# Patient Record
Sex: FEMALE | Race: WHITE | NOT HISPANIC OR LATINO | Employment: OTHER | URBAN - METROPOLITAN AREA
[De-identification: names, ages, dates, MRNs, and addresses within clinical notes are randomized per-mention and may not be internally consistent; named-entity substitution may affect disease eponyms.]

---

## 2023-05-24 LAB
ALANINE AMINOTRANSFERASE (SGPT) (U/L) IN SER/PLAS: 31 U/L (ref 7–45)
ALBUMIN (G/DL) IN SER/PLAS: 4.3 G/DL (ref 3.4–5)
ALKALINE PHOSPHATASE (U/L) IN SER/PLAS: 64 U/L (ref 33–136)
ANION GAP IN SER/PLAS: 10 MMOL/L (ref 10–20)
ASPARTATE AMINOTRANSFERASE (SGOT) (U/L) IN SER/PLAS: 36 U/L (ref 9–39)
BILIRUBIN TOTAL (MG/DL) IN SER/PLAS: 0.7 MG/DL (ref 0–1.2)
CALCIUM (MG/DL) IN SER/PLAS: 9.5 MG/DL (ref 8.6–10.3)
CARBON DIOXIDE, TOTAL (MMOL/L) IN SER/PLAS: 28 MMOL/L (ref 21–32)
CHLORIDE (MMOL/L) IN SER/PLAS: 107 MMOL/L (ref 98–107)
CREATININE (MG/DL) IN SER/PLAS: 1.05 MG/DL (ref 0.5–1.05)
ERYTHROCYTE DISTRIBUTION WIDTH (RATIO) BY AUTOMATED COUNT: 13.4 % (ref 11.5–14.5)
ERYTHROCYTE MEAN CORPUSCULAR HEMOGLOBIN CONCENTRATION (G/DL) BY AUTOMATED: 30.8 G/DL (ref 32–36)
ERYTHROCYTE MEAN CORPUSCULAR VOLUME (FL) BY AUTOMATED COUNT: 96 FL (ref 80–100)
ERYTHROCYTES (10*6/UL) IN BLOOD BY AUTOMATED COUNT: 4.55 X10E12/L (ref 4–5.2)
GFR FEMALE: 52 ML/MIN/1.73M2
GLUCOSE (MG/DL) IN SER/PLAS: 104 MG/DL (ref 74–99)
HEMATOCRIT (%) IN BLOOD BY AUTOMATED COUNT: 43.5 % (ref 36–46)
HEMOGLOBIN (G/DL) IN BLOOD: 13.4 G/DL (ref 12–16)
LEUKOCYTES (10*3/UL) IN BLOOD BY AUTOMATED COUNT: 7.1 X10E9/L (ref 4.4–11.3)
NRBC (PER 100 WBCS) BY AUTOMATED COUNT: 0 /100 WBC (ref 0–0)
PLATELETS (10*3/UL) IN BLOOD AUTOMATED COUNT: 191 X10E9/L (ref 150–450)
POTASSIUM (MMOL/L) IN SER/PLAS: 4.4 MMOL/L (ref 3.5–5.3)
PROTEIN TOTAL: 6.4 G/DL (ref 6.4–8.2)
SODIUM (MMOL/L) IN SER/PLAS: 141 MMOL/L (ref 136–145)
THYROTROPIN (MIU/L) IN SER/PLAS BY DETECTION LIMIT <= 0.05 MIU/L: 2.16 MIU/L (ref 0.44–3.98)
UREA NITROGEN (MG/DL) IN SER/PLAS: 20 MG/DL (ref 6–23)

## 2023-10-21 PROBLEM — I49.1 PREMATURE ATRIAL BEATS: Status: ACTIVE | Noted: 2023-10-21

## 2023-10-21 PROBLEM — I47.10 PAROXYSMAL SUPRAVENTRICULAR TACHYCARDIA (CMS-HCC): Status: ACTIVE | Noted: 2023-10-21

## 2023-10-21 PROBLEM — I35.1 AORTIC VALVE REGURGITATION, ACQUIRED: Status: ACTIVE | Noted: 2023-10-21

## 2023-10-21 PROBLEM — R30.0 DYSURIA: Status: ACTIVE | Noted: 2023-10-21

## 2023-10-21 PROBLEM — E78.5 HYPERLIPIDEMIA: Status: ACTIVE | Noted: 2023-10-21

## 2023-10-21 PROBLEM — R93.1 ABNORMAL ECHOCARDIOGRAM: Status: ACTIVE | Noted: 2023-10-21

## 2023-10-21 PROBLEM — N95.2 VAGINAL ATROPHY: Status: ACTIVE | Noted: 2023-10-21

## 2023-10-21 PROBLEM — G47.62 NOCTURNAL LEG CRAMPS: Status: ACTIVE | Noted: 2023-10-21

## 2023-10-21 PROBLEM — N81.10 PELVIC ORGAN PROLAPSE QUANTIFICATION STAGE 2 CYSTOCELE: Status: ACTIVE | Noted: 2023-10-21

## 2023-10-21 PROBLEM — I10 ESSENTIAL HYPERTENSION, BENIGN: Status: ACTIVE | Noted: 2023-10-21

## 2023-10-21 PROBLEM — I25.10 CORONARY ARTERY DISEASE: Status: ACTIVE | Noted: 2023-10-21

## 2023-10-21 PROBLEM — R35.0 URINARY FREQUENCY: Status: ACTIVE | Noted: 2023-10-21

## 2023-10-21 PROBLEM — N81.9 VAGINAL VAULT PROLAPSE: Status: ACTIVE | Noted: 2023-10-21

## 2023-10-21 PROBLEM — M54.2 CERVICALGIA: Status: ACTIVE | Noted: 2023-10-21

## 2023-10-21 PROBLEM — Z95.1 H/O FOUR VESSEL CORONARY ARTERY BYPASS GRAFT: Status: ACTIVE | Noted: 2023-10-21

## 2023-10-21 PROBLEM — I49.3 PREMATURE VENTRICULAR CONTRACTIONS: Status: ACTIVE | Noted: 2023-10-21

## 2023-10-21 RX ORDER — ALBUTEROL SULFATE 90 UG/1
2 AEROSOL, METERED RESPIRATORY (INHALATION)
COMMUNITY
Start: 2019-01-06 | End: 2024-05-07 | Stop reason: WASHOUT

## 2023-10-21 RX ORDER — ROSUVASTATIN CALCIUM 20 MG/1
1 TABLET, COATED ORAL DAILY
COMMUNITY
Start: 2022-08-29 | End: 2023-11-06 | Stop reason: SDUPTHER

## 2023-10-21 RX ORDER — LOSARTAN POTASSIUM 50 MG/1
1 TABLET ORAL DAILY
COMMUNITY
Start: 2019-06-06 | End: 2024-05-14 | Stop reason: HOSPADM

## 2023-10-21 RX ORDER — ASPIRIN 81 MG/1
1 TABLET ORAL DAILY
COMMUNITY

## 2023-10-24 ENCOUNTER — PROCEDURE VISIT (OUTPATIENT)
Dept: OBSTETRICS AND GYNECOLOGY | Facility: CLINIC | Age: 83
End: 2023-10-24
Payer: MEDICARE

## 2023-10-24 VITALS
HEIGHT: 65 IN | RESPIRATION RATE: 16 BRPM | DIASTOLIC BLOOD PRESSURE: 82 MMHG | TEMPERATURE: 98.1 F | HEART RATE: 78 BPM | WEIGHT: 95.8 LBS | SYSTOLIC BLOOD PRESSURE: 138 MMHG | BODY MASS INDEX: 15.96 KG/M2

## 2023-10-24 SDOH — ECONOMIC STABILITY: FOOD INSECURITY: WITHIN THE PAST 12 MONTHS, THE FOOD YOU BOUGHT JUST DIDN'T LAST AND YOU DIDN'T HAVE MONEY TO GET MORE.: NEVER TRUE

## 2023-10-24 SDOH — ECONOMIC STABILITY: FOOD INSECURITY: WITHIN THE PAST 12 MONTHS, YOU WORRIED THAT YOUR FOOD WOULD RUN OUT BEFORE YOU GOT MONEY TO BUY MORE.: NEVER TRUE

## 2023-10-24 ASSESSMENT — ENCOUNTER SYMPTOMS
LOSS OF SENSATION IN FEET: 0
DEPRESSION: 0
OCCASIONAL FEELINGS OF UNSTEADINESS: 0

## 2023-10-24 ASSESSMENT — COLUMBIA-SUICIDE SEVERITY RATING SCALE - C-SSRS
1. IN THE PAST MONTH, HAVE YOU WISHED YOU WERE DEAD OR WISHED YOU COULD GO TO SLEEP AND NOT WAKE UP?: NO
6. HAVE YOU EVER DONE ANYTHING, STARTED TO DO ANYTHING, OR PREPARED TO DO ANYTHING TO END YOUR LIFE?: NO
2. HAVE YOU ACTUALLY HAD ANY THOUGHTS OF KILLING YOURSELF?: NO

## 2023-10-24 ASSESSMENT — PATIENT HEALTH QUESTIONNAIRE - PHQ9
2. FEELING DOWN, DEPRESSED OR HOPELESS: NOT AT ALL
SUM OF ALL RESPONSES TO PHQ9 QUESTIONS 1 AND 2: 0
1. LITTLE INTEREST OR PLEASURE IN DOING THINGS: NOT AT ALL

## 2023-10-24 ASSESSMENT — PAIN SCALES - GENERAL: PAINLEVEL: 0-NO PAIN

## 2023-10-24 NOTE — PROGRESS NOTES
"Pessary Check    This is a 83 y.o. with a #3 ring with support pessary here for a routine pessary check.    Date of last check: 9/29/2023    Today she reports:  Pessary comfortable: {yes,no:21211}  Vaginal bleeding:{yes,no:21211}  Abnormal vaginal discharge:{yes,no:21211}  Using vaginal estrogen:{yes,no:21211}  ***    Exam:  OBGyn Exam     Procedure:   Pessary position on presentation: {Normal/Abnormal:21235}  Pessary removed and cleaned {WITH/WITHOUT:07411} difficulty  Speculum exam: Vaginal mucosa was examined for the presence of irritation, trauma and/or bleeding   Erosions: {yes,no:21211}   Vaginal atrophy: {yes,no:21211}   Silver nitrate applied :{yes,no:21211}  Pessary replaced {WITH/WITHOUT:38925} difficulty.    Assessment/Plan:  {Assess/PlanSmartLinks:11029}    The patient is satisfied with the pessary and plans to continue use.   Risks, alternative and routine maintenance reviewed with patient.  {continue estrogen vs no estrogen pessary:63356::\"Continue low dose vaginal estrogen to help prevent erosions with pessary. \"}    She will return to the office in 2-3 months for recheck or sooner should she have any problems.    All questions and concerns were answered and addressed.   "

## 2023-11-04 PROBLEM — I47.10 PAROXYSMAL SUPRAVENTRICULAR TACHYCARDIA (CMS-HCC): Status: RESOLVED | Noted: 2023-10-21 | Resolved: 2023-11-04

## 2023-11-04 PROBLEM — I49.3 PREMATURE VENTRICULAR CONTRACTIONS: Status: RESOLVED | Noted: 2023-10-21 | Resolved: 2023-11-04

## 2023-11-06 ENCOUNTER — OFFICE VISIT (OUTPATIENT)
Dept: CARDIOLOGY | Facility: CLINIC | Age: 83
End: 2023-11-06
Payer: MEDICARE

## 2023-11-06 ENCOUNTER — OFFICE VISIT (OUTPATIENT)
Dept: NEUROLOGY | Facility: CLINIC | Age: 83
End: 2023-11-06
Payer: MEDICARE

## 2023-11-06 ENCOUNTER — LAB (OUTPATIENT)
Dept: LAB | Facility: LAB | Age: 83
End: 2023-11-06
Payer: MEDICARE

## 2023-11-06 VITALS
DIASTOLIC BLOOD PRESSURE: 62 MMHG | OXYGEN SATURATION: 96 % | WEIGHT: 97.3 LBS | HEIGHT: 63 IN | HEART RATE: 78 BPM | SYSTOLIC BLOOD PRESSURE: 126 MMHG | BODY MASS INDEX: 17.24 KG/M2

## 2023-11-06 VITALS
DIASTOLIC BLOOD PRESSURE: 72 MMHG | BODY MASS INDEX: 17.05 KG/M2 | SYSTOLIC BLOOD PRESSURE: 124 MMHG | RESPIRATION RATE: 16 BRPM | WEIGHT: 96.2 LBS | HEART RATE: 84 BPM | TEMPERATURE: 96.8 F | HEIGHT: 63 IN

## 2023-11-06 DIAGNOSIS — R41.3 MEMORY LOSS: Primary | ICD-10-CM

## 2023-11-06 DIAGNOSIS — E78.2 MIXED HYPERLIPIDEMIA: Primary | ICD-10-CM

## 2023-11-06 DIAGNOSIS — R41.3 MEMORY LOSS: ICD-10-CM

## 2023-11-06 LAB — TSH SERPL-ACNC: 1.93 MIU/L (ref 0.44–3.98)

## 2023-11-06 PROCEDURE — 83921 ORGANIC ACID SINGLE QUANT: CPT

## 2023-11-06 PROCEDURE — 3074F SYST BP LT 130 MM HG: CPT | Performed by: INTERNAL MEDICINE

## 2023-11-06 PROCEDURE — 1160F RVW MEDS BY RX/DR IN RCRD: CPT | Performed by: PSYCHIATRY & NEUROLOGY

## 2023-11-06 PROCEDURE — 3078F DIAST BP <80 MM HG: CPT | Performed by: INTERNAL MEDICINE

## 2023-11-06 PROCEDURE — 84443 ASSAY THYROID STIM HORMONE: CPT

## 2023-11-06 PROCEDURE — 1036F TOBACCO NON-USER: CPT | Performed by: PSYCHIATRY & NEUROLOGY

## 2023-11-06 PROCEDURE — 1159F MED LIST DOCD IN RCRD: CPT | Performed by: PSYCHIATRY & NEUROLOGY

## 2023-11-06 PROCEDURE — 99204 OFFICE O/P NEW MOD 45 MIN: CPT | Performed by: PSYCHIATRY & NEUROLOGY

## 2023-11-06 PROCEDURE — 82607 VITAMIN B-12: CPT

## 2023-11-06 PROCEDURE — 3078F DIAST BP <80 MM HG: CPT | Performed by: PSYCHIATRY & NEUROLOGY

## 2023-11-06 PROCEDURE — 3074F SYST BP LT 130 MM HG: CPT | Performed by: PSYCHIATRY & NEUROLOGY

## 2023-11-06 PROCEDURE — 1126F AMNT PAIN NOTED NONE PRSNT: CPT | Performed by: INTERNAL MEDICINE

## 2023-11-06 PROCEDURE — 99214 OFFICE O/P EST MOD 30 MIN: CPT | Performed by: INTERNAL MEDICINE

## 2023-11-06 PROCEDURE — 82746 ASSAY OF FOLIC ACID SERUM: CPT

## 2023-11-06 PROCEDURE — 1159F MED LIST DOCD IN RCRD: CPT | Performed by: INTERNAL MEDICINE

## 2023-11-06 PROCEDURE — 1160F RVW MEDS BY RX/DR IN RCRD: CPT | Performed by: INTERNAL MEDICINE

## 2023-11-06 PROCEDURE — 36415 COLL VENOUS BLD VENIPUNCTURE: CPT

## 2023-11-06 PROCEDURE — 1126F AMNT PAIN NOTED NONE PRSNT: CPT | Performed by: PSYCHIATRY & NEUROLOGY

## 2023-11-06 PROCEDURE — 1036F TOBACCO NON-USER: CPT | Performed by: INTERNAL MEDICINE

## 2023-11-06 RX ORDER — ROSUVASTATIN CALCIUM 40 MG/1
40 TABLET, COATED ORAL DAILY
Qty: 90 TABLET | Refills: 3 | Status: SHIPPED | OUTPATIENT
Start: 2023-11-06 | End: 2024-11-05

## 2023-11-06 ASSESSMENT — ENCOUNTER SYMPTOMS
DYSPNEA ON EXERTION: 1
DIFFICULTY WITH CONCENTRATION: 1
CONFUSION: 1

## 2023-11-06 NOTE — PATIENT INSTRUCTIONS
The patient needs an MRI of the brain without contrast, EEG, work-up for the reversible causes of dementia and neuropsychiatric testing.  The patient should continue all of her medicines and stroke risk factor modification.  The patient should try to stay as active as she can both mentally and physically.  I discussed all these issues in detail with the patient and her daughter and answered all her questions.  The patient follow-up with me in 6 months.

## 2023-11-06 NOTE — PROGRESS NOTES
Subjective   Kimberly Randle is a 83 y.o. female.    Chief Complaint:  Follow-up coronary artery disease, coronary bypass grafting, hypertension, valvular heart disease.  Change in mental status.    HPI    On her last visit in May 2023 she presented with symptoms of changing mental status.  We did do a CT of the brain which showed significant small vessel disease.  She was also seen and evaluated by the neurology service.  Additional laboratory data was ordered.  Since her last visit she feels about the same.  She continues to live independently on her own.  She has a close supportive family that looks in her on her on a regular basis.  No problems with her medications.  Some of her history was obtained from her daughter because of her mental status.    The patient's   in 2022 when he became suddenly unresponsive at home.  He was found to be in ventricular fibrillatory arrest and could not be resuscitated.    A stress thallium study performed in 2018 showed a fixed apical defect with an ejection fraction of 74%.     Her cardiac history is significant for coronary artery bypass grafting performed in  with a left internal mammary graft to the anterior descending, she has a right internal mammary graft to the right coronary artery. She has a radial artery graft to the diagonal branch and circumflex coronary artery.     Other medical problems including history of hyperlipidemia. She has a history of moderate aortic regurgitation.       Allergies  Medication    · atorvastatin   Adverse Reaction; Confusion; Gatrointestinal upset; Severe stomach cramping; Recorded By: Ana Chang; 2022 5:01:02 PM   · codeine   Recorded By: Maoj Miller; 2018 1:52:11 PM     Family History  Mother    · Family history of congestive heart failure (V17.49) (Z82.49)     Social History  Problems    · Does not use illicit drugs (V49.89) (Z78.9)   · Former smoker (V15.82) (Z87.891)   · Occasional alcohol  use      Review of Systems   Constitutional: Positive for malaise/fatigue.   Cardiovascular:  Positive for dyspnea on exertion.   Musculoskeletal:  Positive for arthritis.   Neurological:  Positive for difficulty with concentration.       Visit Vitals  Smoking Status Former        Objective     Constitutional:       Appearance: Not in distress.   Neck:      Vascular: JVD normal.   Pulmonary:      Breath sounds: Normal breath sounds.   Cardiovascular:      Normal rate. Regular rhythm. Normal S1. Normal S2.       Murmurs: There is a grade 1/6 systolic murmur.      No gallop.    Pulses:     Intact distal pulses.   Edema:     Peripheral edema absent.   Abdominal:      General: There is no distension.      Palpations: Abdomen is soft.   Neurological:      Mental Status: Alert.         Lab Review:   Lab Results   Component Value Date     05/24/2023    K 4.4 05/24/2023     05/24/2023    CO2 28 05/24/2023    BUN 20 05/24/2023    CREATININE 1.05 05/24/2023    GLUCOSE 104 (H) 05/24/2023    CALCIUM 9.5 05/24/2023     Lab Results   Component Value Date    CHOL 156 11/23/2022    TRIG 125 11/23/2022    HDL 61.4 11/23/2022       Assessment:    1.  Coronary disease.  Status post coronary bypass grafting.  No anginal symptoms although history is somewhat suspect.    2.  Hyperlipidemia.  Cholesterol 167, HDL 59, LDL 90.  She is not at goal.  Discussed the importance of getting her lipid profiles as low as possible not only for her cardiac status but also for her neurologic status.  We will increase rosuvastatin to 40 mg daily.    3.  Hypertension.  Blood pressures are normal.    4.  Memory loss.  Being seen and evaluated by the neurology service.  Today's laboratory data is pending.

## 2023-11-06 NOTE — PROGRESS NOTES
Subjective     Kimberly Randle is a 83 y.o. year old female    HPI  The patient and her daughter both attend the appointment today.  The patient feels that she is doing very well from a neurological standpoint and that her memory is fine.  Her daughter who is in the room corroborates this and feels that the the patient is doing well.  Her other daughter is an occupational therapist and feels that the patient has been in decline for approximately several years.  The patient's daughter feels that she has had some mild general cognitive decline but no specific area of cognition has been affected.  Her   this past February and she does not like being alone but she does not want to have anyone else in the house.  Patient is able to take care of all of her activities of daily living including cooking, dressing, bathing and cleaning the house.  The patient denies any headaches, double vision, vision loss, facial or extremity weakness or numbness or walking problems.  The patient had a CT scan of the brain done in  secondary to head and neck pain.  It showed mild to moderate white matter disease but no acute process was noted.  The daughter who was in room feels that her memory function is fine.  The patient has banda of  for healthcare and finances and she also has a living well.    Review of Systems   Psychiatric/Behavioral:  Positive for confusion.    All other systems reviewed and are negative.        Patient Active Problem List   Diagnosis    Abnormal echocardiogram    Aortic valve regurgitation, acquired    Cervicalgia    Coronary artery disease    Dysuria    Essential hypertension, benign    H/O four vessel coronary artery bypass graft    Hyperlipidemia    Nocturnal leg cramps    Pelvic organ prolapse quantification stage 2 cystocele    Premature atrial beats    Urinary frequency    Vaginal atrophy    Vaginal vault prolapse     Past Medical History:   Diagnosis Date    Personal history of  other diseases of the circulatory system     History of coronary artery disease    Personal history of other diseases of the circulatory system     History of cardiac disorder    Personal history of other diseases of the circulatory system 12/06/2018    History of coronary artery disease    Personal history of other endocrine, nutritional and metabolic disease     History of hyperlipidemia    Personal history of other specified conditions     History of palpitations    Personal history of transient ischemic attack (TIA), and cerebral infarction without residual deficits     History of transient cerebral ischemia     Past Surgical History:   Procedure Laterality Date    CORONARY ARTERY BYPASS GRAFT  04/04/2018    CABG    OTHER SURGICAL HISTORY  04/27/2018    Surgery    OTHER SURGICAL HISTORY  12/03/2020    Hysterectomy    OTHER SURGICAL HISTORY  12/03/2020    Lumpectomy     Social History     Tobacco Use    Smoking status: Former     Types: Cigarettes     Passive exposure: Past    Smokeless tobacco: Never   Substance Use Topics    Alcohol use: Not Currently     family history includes Cancer in her father; Heart failure in her mother.    Current Outpatient Medications:     aspirin 81 mg EC tablet, Take 1 tablet (81 mg) by mouth once daily., Disp: , Rfl:     calcium citrate/vitamin D2 (MONIKA-CITRATE ORAL), Take 1 tablet by mouth once daily., Disp: , Rfl:     docosahexaenoic acid/epa (FISH OIL ORAL), Use as directed, Disp: , Rfl:     glucosamine/chondr grove A sod (glucosamine-chondroitin) 1,500-1,200 mg/30 mL liquid, Glucosamine-Chondroitin 8340-1825 MG/30ML Oral Liquid  Refills: 0     Active, Disp: , Rfl:     losartan (Cozaar) 50 mg tablet, Take 1 tablet (50 mg) by mouth once daily., Disp: , Rfl:     rosuvastatin (Crestor) 20 mg tablet, Take 1 tablet (20 mg) by mouth once daily., Disp: , Rfl:     albuterol (ProAir HFA) 90 mcg/actuation inhaler, Inhale 2 puffs every 4 hours., Disp: , Rfl:   Allergies   Allergen Reactions  "   Atorvastatin GI Upset    Amoxicillin Unknown    Codeine Unknown       Objective   /72 (BP Location: Left arm)   Pulse 84   Temp 36 °C (96.8 °F) (Temporal)   Resp 16   Ht 1.6 m (5' 3\")   Wt (!) 43.6 kg (96 lb 3.2 oz)   BMI 17.04 kg/m²     GENERAL APPEARANCE:  No distress, alert and cooperative.     CARDIOVASCULAR: Regular, rate and rhythm, without murmur. No carotid bruits. Pulses +2 and equal in all extremities. No edema, or tenderness to palpation.    MENTAL STATE:  Orientation was normal to time, place and person.  The patient is able to remember 0 out of 3 objects at 5 minutes and her remote memory function was normal.  Attention span and concentration were normal. Language testing was normal for comprehension, repetition, expression, and naming. Calculation was intact. The patient could correctly interpret a picture, and copy a diagram. General fund of knowledge was intact.  The patient's Mini-Mental status examination score was 22 out of 30.    OPHTHALMOSCOPIC: The ophthalmoscopic exam normal. The fundi were well visualized with normal disc margins, clear vessels and vascular pulsations. No disc edema. The cup/disk ratio was not enlarged. No hemorrhages or exudates were present in the posterior segments that were visualized.     CRANIAL NERVES:  Cranial nerves were normal.      CN 2- Visual Acuity  OD: 20/20 (corrected)   OS: 20/20 (corrected); visual fields full to confrontation.      CN 3, 4, 6-  Pupils round, 4 mm in diameter, equally reactive to light. No ptosis. EOMs normal alignment, full range of movement, no nystagmus     CN 5- Facial sensation intact bilaterally. Normal corneal reflexes.      CN 7- Normal and symmetric facial strength. Nasolabial folds symmetric.     CN 8- Hearing intact to finger rub, whisper.      CN 9- Palate elevates symmetrically. Normal gag reflex.      CN 11- Normal strength of shoulder shrug and neck turning      CN 12- Tongue midline, with normal bulk and " strength; no fasciculations.     MOTOR:  Motor exam was normal. Muscle bulk and tone were normal in both upper and lower extremities. Muscle strength was 5/5 in distal and proximal muscles in both upper and lower extremities. No fasciculations, tremor or other abnormal movements were present.     REFLEXES: The patient's reflexes are 1+ in the biceps, triceps and brachioradialis, 2+ at the patellae bilaterally and 0 at the ankles bilaterally.  Babinski: toes downgoing to plantar stimulation. No clonus, frontal release signs or other pathologic reflexes present.     SENSORY: Sensory exam was intact to light touch, sharp/dull, vibration and position sense in both UE and LE.     COORDINATION: JAIME were intact in upper and lower extremities.  In UE- finger-nose-finger was intact and in LE- heel-to-shin was intact without dysmetria or overshoot.      GAIT: Station was stable with a normal base and negative Romberg sign. Gait was stable with a normal arm swing and speed. No ataxia, shuffling, steppage or waddling was noted. Tandem gait was intact. Postural reflexes were normal.     Assessment/Plan   Impression: The patient is an 83-year-old female who feels she is doing quite well but one of her daughter feels that she is not functioning well from a cognitive standpoint.  Her neurological examination is mildly abnormal and noted above.  The differential diagnosis includes minimal cognitive impairment, dementia, subdural hematoma, brain tumor, seizure and vitamin deficiency.      Plan: The patient needs an MRI of the brain without contrast, EEG, work-up for the reversible causes of dementia and neuropsychiatric testing.  The patient should continue all of her medicines and stroke risk factor modification.  The patient should try to stay as active as she can both mentally and physically.  I discussed all these issues in detail with the patient and her daughter and answered all her questions.  The patient follow-up with me in  6 months.

## 2023-11-07 LAB
FOLATE SERPL-MCNC: >24 NG/ML
VIT B12 SERPL-MCNC: 778 PG/ML (ref 211–911)

## 2023-11-09 ENCOUNTER — ANCILLARY PROCEDURE (OUTPATIENT)
Dept: RADIOLOGY | Facility: CLINIC | Age: 83
End: 2023-11-09
Payer: MEDICARE

## 2023-11-09 DIAGNOSIS — R41.3 MEMORY LOSS: ICD-10-CM

## 2023-11-09 LAB — METHYLMALONATE SERPL-SCNC: 0.15 UMOL/L (ref 0–0.4)

## 2023-11-09 PROCEDURE — 70551 MRI BRAIN STEM W/O DYE: CPT | Performed by: RADIOLOGY

## 2023-11-09 PROCEDURE — 70551 MRI BRAIN STEM W/O DYE: CPT

## 2023-11-15 ENCOUNTER — HOSPITAL ENCOUNTER (OUTPATIENT)
Dept: NEUROLOGY | Facility: HOSPITAL | Age: 83
Discharge: HOME | End: 2023-11-15
Payer: MEDICARE

## 2023-11-15 DIAGNOSIS — R41.3 MEMORY LOSS: ICD-10-CM

## 2023-11-15 PROCEDURE — 95819 EEG AWAKE AND ASLEEP: CPT

## 2023-11-15 PROCEDURE — 95819 EEG AWAKE AND ASLEEP: CPT | Performed by: PSYCHIATRY & NEUROLOGY

## 2024-01-04 ENCOUNTER — TELEPHONE (OUTPATIENT)
Dept: CARDIOLOGY | Facility: CLINIC | Age: 84
End: 2024-01-04
Payer: MEDICARE

## 2024-01-04 NOTE — TELEPHONE ENCOUNTER
Pt called to report she was taking Rosuvastatin 20mg and Dr. Barrett increased dose to 40mg.    Pt states she cannot take Rosuvastatin because it is causing her to have memory issues and increased anxiety. Instructed pt to try taking Rosuvastatin every other day and I will discuss with Dr. Barrett when he returns from vacation the week of 1/15/23. Pt verbalizes understanding of all instructions. All questions and concerns addressed at this time.

## 2024-01-09 ENCOUNTER — APPOINTMENT (OUTPATIENT)
Dept: OBSTETRICS AND GYNECOLOGY | Facility: CLINIC | Age: 84
End: 2024-01-09
Payer: MEDICARE

## 2024-01-15 NOTE — TELEPHONE ENCOUNTER
Donna returned phone call and wanted to provide update regarding recent testing, labs. Per Donna, pt saw PCP Dr. Brito who stopped Rosuvastatin due to symptoms of dizziness, off balance, memory loss. Donna also states Dr. Brito ordered blood work and carotid US. Per Donna, pt is also seeing neurology and had testing with Dr. Love and will be seeing neurodiagnostics regarding memory loss.    Donna wanted to provide update regarding pt's care at this time. Dr. Barrett notified of above.

## 2024-01-15 NOTE — TELEPHONE ENCOUNTER
Reviewed message with Dr. Barrett. Pt has also been intolerant to Lipitor in the past. Pt is currently seeing Neuropsychologist for memory loss.    Per Dr. Barrett, pt's current symptoms are not due to Rosuvastatin. Pt should continue taking Rosuvastatin.    LM for pt to call office.

## 2024-01-16 ENCOUNTER — OFFICE VISIT (OUTPATIENT)
Dept: PSYCHOLOGY | Facility: CLINIC | Age: 84
End: 2024-01-16
Payer: MEDICARE

## 2024-01-16 DIAGNOSIS — F41.9 ANXIETY DISORDER, UNSPECIFIED TYPE: Primary | ICD-10-CM

## 2024-01-16 DIAGNOSIS — F03.90 MAJOR NEUROCOGNITIVE DISORDER (MULTI): ICD-10-CM

## 2024-01-16 PROCEDURE — 99211NT NEUROPYSCH TESTING PENDING FINAL BILLING

## 2024-01-16 PROCEDURE — 1159F MED LIST DOCD IN RCRD: CPT

## 2024-01-16 PROCEDURE — 1126F AMNT PAIN NOTED NONE PRSNT: CPT

## 2024-01-16 PROCEDURE — 1160F RVW MEDS BY RX/DR IN RCRD: CPT

## 2024-01-16 PROCEDURE — 1036F TOBACCO NON-USER: CPT

## 2024-01-16 NOTE — PROGRESS NOTES
NEUROPSYCHOLOGICAL EXAMINATION REPORT    Name: Kimberly Randle   MRN: [id number]   Age: 83 y.o.   Handedness: Right   Ethnicity: White   Education: 12    Referring Provider: Daniel Love MD    Date of Service: 1/16/2024     Reason For Referral  Ms. Kimberly Randle is an 83 y.o. female who was referred for a neuropsychological evaluation due to complaints of memory loss. The purpose of the evaluation was reviewed. In the context of COVID-19, she was informed of the relative risk of virus exposure involved in visiting a medical facility and the measures being taken at [facility] to reduce the risk of spreading the virus. All questions were answered. The patient verbalized understanding and written informed consent was obtained.    Diagnosis  Major Neurocognitive Disorder   Anxiety, unspecified     Summary and Impressions   Ms. Randle is an 83 y.o. female with a primarily medical history significant for coronary artery disease, hypertension, and hyperlipidemia who is presenting for new onset of memory complaints. The patient demonstrated adequate task engagement considering genuine cognitive impairment. Compared to her estimated average pre-morbid functioning, the patient demonstrated consistent impairments on tasks assessing speed of processing, language, memory, and executive functioning. The patient had exceptionally low to below average performance on all timed tasks. Language was notable for low average confrontation naming (elevated number of paraphasic errors) and below average/exceptionally low verbal fluency (no evidence of phonemic/semantic split, but could be due to floor effect). Memory fell primarily in the below average to exceptionally low range across trials, where she could not recall any information following a delay for all 3 memory tests and did not benefit from cuing on 2 of 3 memory tests. Finally, the patient demonstrated impaired executive functioning skills, where she was  unable to complete a cognitive set shifting task and struggled on a novel problem solving task. Similarly, she had significant difficulty answering questions related to health and safety. There was low average or higher performance on tasks assessing attention, working memory, and visual construction. She denied clinically significant mood symptoms on screeners, although she endorsed some grief symptoms. Her family also reported she is more anxious, easily overwhelmed, and exhibiting more OCD like behavior.     Given evidence of cognitive decline compared to her premorbid level and interference with instrumental activities of daily living, the patient's profile is best characterized by a Major Neurocognitive Disorder (F03.90). The etiology of the diagnosis is suspected to be related to Alzheimer's disease pathology considering her cognitive profile (amnestic memory profile and inefficient language skills), evidence of temporal lobe atrophy on brain imaging, report of gradual and progressing symptoms, evidence of anosognosia, and behavioral observations of rapid forgetting. The patient's cognitive profile is likely also impacted by vascular pathology (with evidence of small-vessel ischemic change on imaging) and grief/anxiety.     Recommendations   Given the nature of present findings, ongoing monitoring and follow-up care with Dr. Love would be prudent. Although not considered necessary, CSF testing for AD pathology could be completed if there remains desire for greater diagnostic certainty. Regardless, Ms. Randle is encouraged to talk to her prescribing provider about medications that can assist with cognitive decline. All medications need to be approved by a licensed medical provider to ensure they are not medically contraindicated and to reduce the risk of drug interactions.  Ms. Randle lives alone, and there are significant concerns related to her managing functional tasks considering the present  neurocognitive findings. She may benefit from increased family involvement, such as assistance with medication management, medical appointments, transportation, and meal preparation. If additional supports appear necessary or there is further decline, she may benefit from additional support (e.g., home health, assistance services, or relocating to an assisted-living program/nursing home) to provide more structure, safety, and social engagement.     Safety measures should be put in place to reduce the risk of fall, such as using an assistive walking device, installing guards in the shower, keeping living spaces free from clutter, and using a medical alert button. She may also benefit in a referral to PT due to significant concerns with her balance.   Ms. Randle and her family may be interested in a consultation with a  who specializes in cognitive decline. Services offered include counseling, educational resources, support groups, and preparation for future changes. Ms. Randle should establish care at [facility] ([phone]).   Ms. Randle is encouraged to refrain from driving. This is in consideration that she demonstrated significant impairments on tasks assessing speed of processing, cognitive set shifting, and memory. The patient can schedule a driving evaluation if she would like a second opinion. Please contact the Rehabilitation Department at [facility] ([phone]). The Association for  Rehabilitation may have additional options for  rehabilitation specialists ([phone] or emailing info@aded.net).   The Alzheimer's Association (www.alz.org) has useful resources and support for those living or caring for an individual with Alzheimer's Disease. They also offer a 24-hour helpline (1-787.381.3578). Additionally, FLOYD Dominique, LISA at the Alzheimer's Association ([phone]) coordinates AD support groups.   The family may be interested  in obtaining resources and guides to assist those living with dementia. The following resources are recommended: (1) The 36-Hour Day: A Family Guide to Caring for Persons with Alzheimer's Disease, Related Dementing Illnesses, and Memory Loss in Later Life (3rd Edition) by Abigail Ibarra M.A. and Jamil Palacios M.D., (2) Moments of [place] by Silvia Lopez provides a number of simple, helpful hints for caregivers, and (3) Alzheimer's Disease: A Handbook for Caregivers (3rd Edition) by MARBELLA Box M.D., JOI Trejo M.D., JOI Cervantes, Ph.D., and NIKOLAI Santana, RNC.   Physical, mental, and social stimulation are known to be protective factors for brain health. This includes engaging in regular exercise (e.g., walking and practicing yoga), staying socially active (e.g., volunteering and spending time with friends/family), and engaging in cognitively stimulating activities (e.g., completing puzzles or learning new hobbies). She should also manage other co-morbid medical conditions that may be impacting cognition as part of brain health (e.g., vascular risk factors, hearing loss without use of hearing aids, and anxiety/grief).   Ms. Randle can undergo an updated neuropsychological evaluation if there are significant change in neurological health and/or notable/unexpected change in functional status. The current evaluation can serve as a baseline for any future re-evaluations.    To be billed after 1/23/2024 feedback: 81821 = 1; 79284 = 1; 29754 = 1; 74955=3; 08136=6; 28912 = 1; 78151 = 4    History of Presenting Illness   [facility] indicate [facility]le established with Daniel Love MD in the Neurology department on 11/06/2023. She presented to the appointment with her two daughters. The patient and one of her daughters denied noticing neurological complaints, although her other daughter who is an OT indicated she has noticed the patient have a decline in cognitive functioning for several years. The neurological  "examination was mildly abnormal. She completed a brief cognitive screener that was below expectations (MMSE = 22/30), and she had difficulty recalling 3 objects after 5 minutes. The provider indicated the differential diagnoses included \"minimal cognitive impairment, dementia, subdural hematoma, brain tumor, seizure and vitamin deficiency.\" A brain MRI was ordered and read to show moderate volume loss, mild asymmetric volume loss of the temporal lobe (right temporal lobe demonstrated greater volume loss than left temporal lobe), scattered nonspecific white matter changes within the cerebral hemispheres bilaterally, and punctate foci of bright signal within the subinsular regions, basal ganglia, and thalami bilaterally. Her most recent brain CT (1/04/24) was read to show moderate volume loss and nonspecific white matter changes. Relevant labs from 1/24 were read to show vitamin D being above the reference range, and TSH and vitamin B12 were within reference range.     Cognitive Complaints and Functional Status   Ms. Randle reported noticing mild memory complaints that she attributed to normal aging. She indicated memory is worse when anxious. The patient's family reported there was a gradual decline in cognition over the past several years. Thinking skills are reportedly getting worse with no clear precipitating factor. The family has noticed primarily short-term memory loss, such as repeating questions, forgetting conversations, losing items, and getting lost in her own neighborhood. There were some concerns with long-term memory, such as forgetting the temporal order of events in her history. Cues reportedly help half of the time with her memory. The family has also noticed reduced word finding, and receptive language is reportedly impacted by moderate/severe hearing loss (hearing aids ordered). She also has problems sustaining/switching attention, completing tasks as quickly, making decisions without clear " choices, and organizing items. There were no reported problems with visual perception, as well as new changes with disinhibition, decreased empathy, and perseverative/stereotyped behaviors.     Ms. Randle denied cognitive problems related to managing activities of daily living (ADLs), although she will sometimes forget that she completes these activities (e.g., washing her hands). The family indicated there were also problems managing instrumental activities of daily living (IADLs). The patient indicated it is more effortful to complete IADLs, where it takes her significantly longer to complete the task. The patient reportedly manages medications, although she has more difficulty organizing medications (daughter double checks and finds errors) and taking medications late. The patient has been managing finances, but her family is now double checking her finances and have noticed several instances of fraud (e.g.,  pushed a credit card on her with purchases on the credit card that were not hers). Family has started managing medical appointments because it is overwhelming for her, and technology has been a barrier for her to review her own medical records. The patient reportedly drives, but she limits driving to familiar locations that are close to her. She denied getting lost when driving, and she denied problems with unsafe driving behavior. Family indicate she is a good  with no concerns. The patient's daughter is the power of  for healthcare and finances, and she has a living will.     Medical History and Status  Developmental: There were no reported birth complications or problems meeting developmental milestones.  Current Medical: In addition to the above history, she has coronary artery disease (coronary artery bypass grafting performed in 1998), hypertension, and hyperlipidemia. There is no known history of traumatic brain injuries, seizures, cerebrovascular events, or other  neurological disorders.  Family Medical History: The patient denied known family medical history of neurologic disorders.   Motor and Sensory Complaints: The patient reported falling approximately 3 months ago with no significant injury, and she indicated she will lose her balance often. She denied having abnormal motor movements. The patient has moderate to severe hearing loss for high frequencies (hearing aids pending), and she denied current problems with vision.     Pain: The patient endorsed rare knee pain, and she denied current pain.   Sleep: She reported sleeping 6 hours nightly, and she will take some naps in the day. Sleep is interrupted by nocturia 1-2 times per night. She denied snoring or gasping in sleep. She has never completed a sleep study.  Substance Use History: The patient denied current or past abuse of alcohol and illicit drugs. She has a history of using tobacco products inconsistently for 12 years, and she indicated smoking half a pack of cigarettes per day. She stopped smoking in 1998. She reported minimal caffeine (half a cup of coffee) usage.     Medications  Current Outpatient Medications   Medication Instructions    albuterol (ProAir HFA) 90 mcg/actuation inhaler 2 puffs, inhalation, Every 4 hours RT    aspirin 81 mg EC tablet 1 tablet, oral, Daily    calcium citrate/vitamin D2 (MONIKA-CITRATE ORAL) 1 tablet, oral, Daily    docosahexaenoic acid/epa (FISH OIL ORAL) Use as directed     glucosamine/chondr grove A sod (glucosamine-chondroitin) 1,500-1,200 mg/30 mL liquid Glucosamine-Chondroitin 6506-4619 MG/30ML Oral Liquid   Refills: 0       Active    losartan (Cozaar) 50 mg tablet 1 tablet, oral, Daily    rosuvastatin (CRESTOR) 40 mg, oral, Daily      Psychiatric History and Status   Psychiatric history: Ms. Randle indicated having a history of depression when she was raising her children, and she previously engaged in psychotherapy. She denied history of using psychiatric medications or being  "psychiatrically hospitalized.    Current Mood: Ms. Randle reported feeling \"nervous\" about the evaluation. She endorsed some grief symptoms related to her spouse passing away approximately one year ago and cat passing away approximately one month ago. Otherwise, she denied symptoms of depression and anxiety. The patient's family reported she is much more anxious, as evidenced by her becoming easily overwhelmed, being anxious over \"little details,\" and having racing thoughts. Her family also shared she has always been very orderly, but there has been an increase in OCD like behavior since her spouse passed away. For example, she will be overly protective of her home (e.g., checking windows/doors frequently) and writing copious notes on all types of mail. Her family acknowledged this behavior may have increased because it occupies her time since her spouse passed away. Her family also reported she will become easily agitated when family comment on any cognitive changes.   Suicidal/Homicidal Ideations: Ms. Randle denied current or past ideation, intent, or plan. However, her family has shared she has passively said \"I want to die.\" On inquiry, the patient indicated she would never take her own life because she would be \"too scared\" and she does not actually mean it. Both the patient and family indicated no concerns for her safety.      Psychosocial History   Academic: Ms. Randle competed 12 years of education, and she described herself as a B student. 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 She reported no history of attention difficulties, learning problems, special services, or repeated/skipped grades in school.  Employment: Ms. Randle primarily worked as a [employer], and she retired when she was 60 years old.   Social: Ms. Randle resides independently. She is  (spouse passed away unexpectedly in 02/2022), and she has 3 children. The family " "expressed concern about her safety because of increased problems with balance. For pleasure, she enjoys watching TV and cleaning. The family indicated she has decreased in her participation of some hobbies (e.g., going to gym or reading a magazine).   Legal: Ms. Randle denied current legal concerns.     MENTAL STATUS/BEHAVIORAL OBSERVATIONS  Orientation: She was oriented to person and situation. She was not oriented to date, as evidence of her indicating it was \"1/7/1986.\"   Motor: She ambulated independently and slowly. There were no abnormal motor movement observed.   Affect-Mood: Mood was slightly agitated with a congruent affect.   Language:  Expressive speech was notable for mild word finding difficulties, and she was observed to make paraphasic errors. Receptive speech was impacted by hearing loss without use of hearing aids. Questions needed to be repeated, and she would often provide tangential responses to questions during the interview.   Clinical Interview: She arrived late and accompanied to the appointment with her daughter and son-in-law. She was appropriately groomed and casually attired. Ms. Randle appeared to lack insight regarding her cognitive deficits, often indicating changes are related to normal aging. She responded defensively when her daughter provided contradictory information regarding her functioning. The patient was also observed to confuse aspects of her history, and she would need to defer to her daughter to answer some questions (e.g., recalling medications/dosages).   Cognitive testing: Vision was adequate for testing purposes, and hearing was impacted by moderate to severe hearing loss without the use of hearing aids. Instructions needed to be repeated multiple times, and she needed significant prompting/assistance during more difficult tasks. The patient completed all tasks presented to her and sustained attention. She was observed to be slightly anxious during testing. She " became agitated upon failure, but persisted relatively calmly. Rapid forgetting was observed with her forgetting instructions, forgetting items (e.g., phone and purse), repeating questions, and confusing some parts of her history. Performance on one stand alone measures of task engagement was below expectations for the 1st trial with performance falling in the expected range on the other trials (TOMM), and she had adequate task engagement on another embedded measure of task engagement (RDS). The results are considered an accurate representation of current cognitive status considering genuine cognitive dysfunction.     Procedures/Tests  In addition to the clinical interview, the following tests were administered by a trained psychometrist: stand alone and embedded measures of task engagement; Wechsler Test of Adult Reading (WTAR); Wechsler Adult Intelligence Scale, 4th Edition (WAIS-IV); The Repeatable Battery for the Assessment of Neuropsychological Status Update (RBANS); Trail Making Test; Modified Wisconsin Card Sorting Test (M-WCST); Guilford Naming Test, 2nd Edition (BNT-II); Verbal Fluency (FAS and Animal Naming); Grooved Pegboard; Wechsler Memory Scale, 4th Edition (WMS-IV); Barnard Verbal Learning Test, Revised (HVLT-R); Brief Visuospatial Memory Test, Revised (BVMT-R); Clock; Independent Living Scale (ILS); Geriatric Depression Scale (GDS); and General Anxiety Disorder-7 (JAN-7). This neuropsychological evaluation employed test score adjustment based on available and relevant demographic characteristics.    Test Results  The following classification ranges were used to characterize the testing results:      Descriptors:  T-Score Standard Score Z-Score Scaled Score %ile Rank   Exceptionally High > 70 > 130 > 2.0  > 16 > 98   Above Average 64-69 120-129 1.4-1.9 15 91-97   High Average 57-63 110-119 0.7-1.3 12-14 75-90   Average 43-56  0.6 to -0.6 8-11 25-74   Low Average 37-42 80-89 -1.3 to -0.7 6-7 9-24    Below Average 30-36 70-79 -2.0 to -1.4 4-5 2-8   Exceptionally Low < 30 < 70  < -2.0 < 4 < 2     Premorbid Abilities: Premorbid functioning was estimated to fall in the average range based on her performance on a measure resistant to neurocognitive degeneration (WTAR), as well as educational and occupational attainment.   Attention and Working Memory:  Verbal attention was in the average range, and verbal working memory was in the low average to average range (WAIS-IV Digit Span).   Processing Speed: Performance on a task of visual motor scanning and sequencing was in the exceptionally low range (TMT), and she made two errors on the task.    Visual Perception and Construction: The ability to copy a complex figure was in the average range (RBANS Figure Copy); the general gestalt of the figure was maintained with a minor omission of a design element. Notably, she requested to redraw the figure with her best performance being reported.   Language: Confrontation naming (BNT) was in the lower end of the low average range with her making an elevated number of semantic paraphasic errors. Phonemic fluency was in the below average range (FAS) and notable for a number of repetitions and set losses, while semantic fluency was in the exceptionally low range (Animals); there was no significant difference in performance between trials, although this could be impacted by a floor effect.   Psychomotion: Fine motor dexterity fell in the exceptionally low range bilaterally, possibly impacted by arthritis, with no significant discrepancy in performance between trials (Grooved Pegboard).   Executive Functioning: Clock drawing was within normal limits. The patient was unable to complete a visual scanning/sequencing task when a set shifting component was added (TMT B), where she made 6 errors before the task was discontinued per standard test administration. On a task assessing novel problem solving (M-WCST), she completed only 2 of 6  "categories and made an elevated number of errors.    Memory: Semantically learning a clustered word list (HVLT-R) fell in the exceptionally low range (1, 2, and 2 words). There was exceptionally low range recall (0% retained) and recognition (7 hits and 5 false positives) of the list of words. Contextual verbal learning of stories fell in the exceptionally low range (WMS-IV Logical Memory), and she was unable to recall any of the story components following a delay with prompting. Recognition memory fell in the average range, although it was close to chance level.  Visual learning of an array of figures fell in the below average range (BVMT-R). There was exceptionally low recall following a delay with her being unable to recall any of the figures. Recognition memory fell in the exceptionally low range (2 hits and 1 false positive).    Functional Testing: On a measure assessing general knowledge about health and safety, the patient performed in the exceptionally low range (ILS, Health and Safety). The patient was unable to verbally explain how to call the police, verbalize two precautions she would do to protect herself at night (e.g., \"stay home\" and \"don't want to carry a gun.\"),  understand concerns related to unintentionally losing 10 pounds in a short period (e.g., \"be happy\"), and understand what to do if she could not hear conversations (e.g., \"get frustrated\").   Mood: She did not endorse clinically significant symptoms of depression or anxiety on screeners (GDS and JAN-7).     "

## 2024-01-23 ENCOUNTER — CLINICAL SUPPORT (OUTPATIENT)
Dept: PSYCHOLOGY | Facility: CLINIC | Age: 84
End: 2024-01-23
Payer: MEDICARE

## 2024-01-23 ENCOUNTER — PROCEDURE VISIT (OUTPATIENT)
Dept: OBSTETRICS AND GYNECOLOGY | Facility: CLINIC | Age: 84
End: 2024-01-23
Payer: MEDICARE

## 2024-01-23 VITALS
RESPIRATION RATE: 16 BRPM | TEMPERATURE: 97 F | SYSTOLIC BLOOD PRESSURE: 137 MMHG | HEIGHT: 63 IN | HEART RATE: 99 BPM | WEIGHT: 94 LBS | BODY MASS INDEX: 16.66 KG/M2 | DIASTOLIC BLOOD PRESSURE: 65 MMHG

## 2024-01-23 DIAGNOSIS — N95.2 VAGINAL ATROPHY: ICD-10-CM

## 2024-01-23 DIAGNOSIS — N81.9 VAGINAL VAULT PROLAPSE: Primary | ICD-10-CM

## 2024-01-23 DIAGNOSIS — F03.90 MAJOR NEUROCOGNITIVE DISORDER (MULTI): Primary | ICD-10-CM

## 2024-01-23 DIAGNOSIS — F41.9 ANXIETY DISORDER, UNSPECIFIED TYPE: ICD-10-CM

## 2024-01-23 DIAGNOSIS — Z13.89 SCREENING FOR BLOOD OR PROTEIN IN URINE: ICD-10-CM

## 2024-01-23 LAB
POC APPEARANCE, URINE: CLEAR
POC BILIRUBIN, URINE: NEGATIVE
POC BLOOD, URINE: ABNORMAL
POC COLOR, URINE: YELLOW
POC GLUCOSE, URINE: ABNORMAL MG/DL
POC KETONES, URINE: NEGATIVE MG/DL
POC LEUKOCYTES, URINE: NEGATIVE
POC NITRITE,URINE: NEGATIVE
POC PH, URINE: 5.5 PH
POC PROTEIN, URINE: ABNORMAL MG/DL
POC SPECIFIC GRAVITY, URINE: 1.02
POC UROBILINOGEN, URINE: 0.2 EU/DL

## 2024-01-23 PROCEDURE — 96137 PSYCL/NRPSYC TST PHY/QHP EA: CPT | Mod: AH

## 2024-01-23 PROCEDURE — 96137 PSYCL/NRPSYC TST PHY/QHP EA: CPT

## 2024-01-23 PROCEDURE — 96133 NRPSYC TST EVAL PHYS/QHP EA: CPT | Mod: AH

## 2024-01-23 PROCEDURE — 96139 PSYCL/NRPSYC TST TECH EA: CPT

## 2024-01-23 PROCEDURE — 96136 PSYCL/NRPSYC TST PHY/QHP 1ST: CPT | Mod: AH

## 2024-01-23 PROCEDURE — 96138 PSYCL/NRPSYC TECH 1ST: CPT

## 2024-01-23 PROCEDURE — 96116 NUBHVL XM PHYS/QHP 1ST HR: CPT

## 2024-01-23 PROCEDURE — 96132 NRPSYC TST EVAL PHYS/QHP 1ST: CPT | Mod: AH

## 2024-01-23 PROCEDURE — 96136 PSYCL/NRPSYC TST PHY/QHP 1ST: CPT

## 2024-01-23 PROCEDURE — 96132 NRPSYC TST EVAL PHYS/QHP 1ST: CPT

## 2024-01-23 PROCEDURE — 96116 NUBHVL XM PHYS/QHP 1ST HR: CPT | Mod: AH

## 2024-01-23 PROCEDURE — 99214 OFFICE O/P EST MOD 30 MIN: CPT | Performed by: NURSE PRACTITIONER

## 2024-01-23 PROCEDURE — 96133 NRPSYC TST EVAL PHYS/QHP EA: CPT

## 2024-01-23 RX ORDER — ESTRADIOL 0.1 MG/G
CREAM VAGINAL
Qty: 42.5 G | Refills: 3 | Status: SHIPPED | OUTPATIENT
Start: 2024-01-23

## 2024-01-23 SDOH — ECONOMIC STABILITY: FOOD INSECURITY: WITHIN THE PAST 12 MONTHS, THE FOOD YOU BOUGHT JUST DIDN'T LAST AND YOU DIDN'T HAVE MONEY TO GET MORE.: NEVER TRUE

## 2024-01-23 SDOH — ECONOMIC STABILITY: FOOD INSECURITY: WITHIN THE PAST 12 MONTHS, YOU WORRIED THAT YOUR FOOD WOULD RUN OUT BEFORE YOU GOT MONEY TO BUY MORE.: NEVER TRUE

## 2024-01-23 ASSESSMENT — PATIENT HEALTH QUESTIONNAIRE - PHQ9
SUM OF ALL RESPONSES TO PHQ9 QUESTIONS 1 AND 2: 0
1. LITTLE INTEREST OR PLEASURE IN DOING THINGS: NOT AT ALL
2. FEELING DOWN, DEPRESSED OR HOPELESS: NOT AT ALL

## 2024-01-23 ASSESSMENT — ENCOUNTER SYMPTOMS
LOSS OF SENSATION IN FEET: 0
OCCASIONAL FEELINGS OF UNSTEADINESS: 0
DEPRESSION: 0

## 2024-01-23 ASSESSMENT — PAIN SCALES - GENERAL: PAINLEVEL: 0-NO PAIN

## 2024-01-23 ASSESSMENT — COLUMBIA-SUICIDE SEVERITY RATING SCALE - C-SSRS
6. HAVE YOU EVER DONE ANYTHING, STARTED TO DO ANYTHING, OR PREPARED TO DO ANYTHING TO END YOUR LIFE?: NO
2. HAVE YOU ACTUALLY HAD ANY THOUGHTS OF KILLING YOURSELF?: NO
1. IN THE PAST MONTH, HAVE YOU WISHED YOU WERE DEAD OR WISHED YOU COULD GO TO SLEEP AND NOT WAKE UP?: NO

## 2024-01-23 NOTE — PROGRESS NOTES
Pessary Check    This is a 83 y.o. with a #3 ring with support pessary here for a routine pessary check.    Date of last check: 9/29/23     Today she reports:  Pessary comfortable: Yes  Vaginal bleeding:No  Abnormal vaginal discharge:No  Using vaginal estrogen:No    Interval History;  - Her  passed away in Feb. 2023 after heart issues.     Exam:  Physical Exam  Constitutional:       Appearance: Normal appearance.   Genitourinary:      Vulva normal.      No lesions in the vagina.      No vaginal erythema, ulceration or granulation tissue.   Rectum:      No tenderness.   Pulmonary:      Effort: Pulmonary effort is normal.   Neurological:      General: No focal deficit present.      Mental Status: She is alert and oriented to person, place, and time.   Psychiatric:         Mood and Affect: Mood normal.         Behavior: Behavior normal.         Thought Content: Thought content normal.         Judgment: Judgment normal.   Vitals reviewed.          Patient ID: Kimberly Randle is a 83 y.o. female.    Pessary    Date/Time: 1/23/2024 2:05 PM    Performed by: SIERRA Stone  Authorized by: SIERRA Stone    Consent:     Patient agrees, verbalizes understanding, and wants to proceed: yes      Consent given by:  Patient  Procedure:     Pessary type:  Ring w/ support    Pessary size:  3  Outcomes:     Patient tolerance of procedure:  Tolerated well, no immediate complications  Comments:     Procedure comments:  Pessary was removed, cleaned, and replaced. Erosions noted, but no need for silver nitrate.      Assessment/Plan:  83 y.o. female with a stage 2 cystocele presenting for pessary maintenance. Comorbidities include: HTN and CAD s/p CABG 20 years ago.     Diagnoses:   #1 Cystocele, stage 2     Plan:   1. Stage 2 cystocele, pessary maintenance   - #3 ring with support pessary was removed. Speculum exam revealed erosions, but no need for silver nitrate. Pessary was cleaned and replaced  back.   - She will start estrogen cream transvaginally to be used twice weekly at night. She was given a coupon to use for the estrogen.   - The patient is satisfied with the pessary and plans to continue use.     She will return to the office in 3 months for recheck or sooner should she have any problems.     Scribe Attestation:   Kaylie BRAR, am scribing for virtually, and in the presence of Kimberly Graves, APRN-CNP on 1/23/24 at 2:13 PM.

## 2024-01-23 NOTE — PROGRESS NOTES
NEUROPSYCHOLOGICAL FEEDBACK NOTE  Name: Kimberly Randle  : 1940  MRN: [id number]  Referring Provider: Daniel Love MD     Date of Service: 24    Reason for Referral: Ms. Kimberly Randle is an 83 y.o. female who was referred for a neuropsychological evaluation due to complaints of memory loss. The patient returned today for feedback regarding cognitive testing that took place on 2023.    Diagnosis:   1. Major neurocognitive disorder (CMS/HCC)    2. Anxiety disorder, unspecified type       Session Detail:   Current findings were discussed in detail including the diagnosis of Major Neurocognitive Disorder. Etiology was reviewed, including suspected AD pathology, as well as likely impact from vascular pathology and grief/anxiety. Recommendations were reviewed in full. The patient and family showed adequate understanding of all findings and recommendations. All questions were answered and the patient reported having access to the full report.     16942 = 1    2024: 17052 = 1; 09425 = 1; 22964 = 1; 51556=6; 03626=4; 02019 = 1; 87355 = 4

## 2024-01-29 NOTE — PROGRESS NOTES
NEUROPSYCHOLOGICAL DATA SUMMARY    Name: Kimberly Randle   : 1940   MRN: [id number]     Date of Service: 2024     Age: 83 y.o.   Race: White  Education:  12  Preferred Hand: RH  Examiner: Rajani Presley PsyD  Technician: Casie Mckinnon MA    Descriptors:     T-Score Standard Score Z-Score Scaled Score %ile Rank   Exceptionally High > 70 > 130 > 2.0 > 16 > 98   Above Average 64-69 120-129 1.4-1.9 15 91-97   High Average 57-63 110-119 0.7-1.3 12-14 75-90   Average 43-56  0.6 to -0.6 8-11 25-74   Low Average 37-42 80-89 -1.3 to -0.7 6-7 9-24   Below Average 30-36 70-79 -2.0 to -1.4 4-5 2-8   Exceptionally Low < 30 < 70 < -2.0 < 4 < 2       Premorbid  Raw Std Sc %ile   WTAR Reading  31 99 47   WTAR EST FSIQ 31 99 47          WAIS IV  Raw Std Sc %ile   DS Total   16 85 16   DS Forward  8 90 25   DS Backward 6 95 37   DS Sequencing  2 80 9          RBANS: Form = 1 Raw Std Sc %ile   Figure Copy  12 105.25 64          TMT: Reg  Raw Std Sc %ile   Part A: Errors = 2 98 59.5 0   Part B: Errors= 6 D/C            Grooved Pegs Raw Std Sc %ile   Dom: Drops=0 324 65.5 1   Non Dom: Drops=0 273 67 1          Naming   Raw Std Sc %ile   [place] Naming  36 80.5 10          COWAT  Raw Std Sc %ile   Phonemic  17 70 2   Semantic   9 65.5 1          WMS-IV        LM I   4 65 1   LM II   0 65 1   LM Rec  A=6 B=7 10 to 18   LM Contrast - 90 25          HVLT-R: Form= 1 Raw Std Sc %ile   Trial 1   1 61 0   Trial 2   2 59.5 0   Trial 3   2 <=55 <=1   Total Recall  5 <=55 <=1   Trial 4  0 59.5 0   % Retained   0 <=55 <=1   Total Hits   7 - -   Total False Positives  5 - -   Discrimination Index  2 <=55 <=1          BVMT-R: Form= 1 Raw Std Sc %ile   Total Recall  4 73 4   Delayed Recall  0 66.4 1   Discrimination Index  2 1.45 0          Clock   WNL            ILS  Raw Std Sc %ile     26 64 1          M-WCST  Raw Std Sc %ile   # Cat  2 77.5 7   # Persev Errors 8 92.5 31   # Total Errors 27 80.5 10   % Persev Errors 30 104.5 62           Mood Measures  Raw T Score  %   GDS  5 - -   JAN-7    0   - -                   Test Normative References:  Test Manual  MARBELLA Mahmood., MARBELLA Mahmood, & Psychological [employer] (2004). Revised comprehensive norms for an expanded [place] battery: Demographically adjusted neuropsychological norms for  and  adults, professional manual. [facility]: Psychological Assessment Resources  JOI Phillips., JELANI Preston., JELANI Kerr., CHUY Al, NIKOLAI Fowler, & SAMANTHA Taylor (1983). Development and validation of a geriatric depression screening scale: a preliminary report. Journal of Psychiatric Research, 17(1), 37-49.

## 2024-02-22 DIAGNOSIS — G30.1 LATE ONSET ALZHEIMER'S DISEASE WITH BEHAVIORAL DISTURBANCE (MULTI): Primary | ICD-10-CM

## 2024-02-22 DIAGNOSIS — F02.818 LATE ONSET ALZHEIMER'S DISEASE WITH BEHAVIORAL DISTURBANCE (MULTI): Primary | ICD-10-CM

## 2024-02-22 RX ORDER — DONEPEZIL HYDROCHLORIDE 5 MG/1
5 TABLET, FILM COATED ORAL NIGHTLY
Qty: 14 TABLET | Refills: 0 | Status: SHIPPED | OUTPATIENT
Start: 2024-02-22 | End: 2024-03-07

## 2024-03-14 ENCOUNTER — OFFICE VISIT (OUTPATIENT)
Dept: NEUROLOGY | Facility: CLINIC | Age: 84
End: 2024-03-14
Payer: MEDICARE

## 2024-03-14 VITALS
BODY MASS INDEX: 18.21 KG/M2 | TEMPERATURE: 97.6 F | SYSTOLIC BLOOD PRESSURE: 128 MMHG | DIASTOLIC BLOOD PRESSURE: 78 MMHG | HEIGHT: 63 IN | HEART RATE: 72 BPM | WEIGHT: 102.8 LBS | RESPIRATION RATE: 16 BRPM

## 2024-03-14 DIAGNOSIS — G30.1 LATE ONSET ALZHEIMER'S DISEASE WITH BEHAVIORAL DISTURBANCE (MULTI): Primary | ICD-10-CM

## 2024-03-14 DIAGNOSIS — F02.818 LATE ONSET ALZHEIMER'S DISEASE WITH BEHAVIORAL DISTURBANCE (MULTI): Primary | ICD-10-CM

## 2024-03-14 DIAGNOSIS — F03.911 AGITATION DUE TO DEMENTIA (MULTI): ICD-10-CM

## 2024-03-14 PROCEDURE — 1160F RVW MEDS BY RX/DR IN RCRD: CPT | Performed by: PSYCHIATRY & NEUROLOGY

## 2024-03-14 PROCEDURE — 3074F SYST BP LT 130 MM HG: CPT | Performed by: PSYCHIATRY & NEUROLOGY

## 2024-03-14 PROCEDURE — 3078F DIAST BP <80 MM HG: CPT | Performed by: PSYCHIATRY & NEUROLOGY

## 2024-03-14 PROCEDURE — 99215 OFFICE O/P EST HI 40 MIN: CPT | Performed by: PSYCHIATRY & NEUROLOGY

## 2024-03-14 PROCEDURE — 1159F MED LIST DOCD IN RCRD: CPT | Performed by: PSYCHIATRY & NEUROLOGY

## 2024-03-14 PROCEDURE — 1036F TOBACCO NON-USER: CPT | Performed by: PSYCHIATRY & NEUROLOGY

## 2024-03-14 RX ORDER — DONEPEZIL HYDROCHLORIDE 10 MG/1
10 TABLET, FILM COATED ORAL NIGHTLY
Qty: 90 TABLET | Refills: 3 | Status: SHIPPED | OUTPATIENT
Start: 2024-03-14 | End: 2024-05-07 | Stop reason: WASHOUT

## 2024-03-14 ASSESSMENT — ENCOUNTER SYMPTOMS
OCCASIONAL FEELINGS OF UNSTEADINESS: 0
LOSS OF SENSATION IN FEET: 0
DEPRESSION: 0

## 2024-03-14 NOTE — PATIENT INSTRUCTIONS
The patient is essentially stable from a neurological standpoint with exception that at times she gets agitated.  I will increase her donepezil to 10 mg p.o. daily.  I will hold on starting memantine at this time.  If the patient becomes agitated on a consistent basis and I would certainly consider Seroquel 12.5 mg p.o. twice daily.    The patient should continue all of her medicines and stroke risk factor modification.  The patient should try to stay as active as she can both mentally and physically.  We will continue to follow her for sleep and safety issues.  The patient needs to continue using her hearing aids on a consistent basis.  The patient does need a living well.  The patient should stop driving.  I discussed all these issues in detail with the patient and her family and answered all her questions.  The patient follow-up with me in 6 months.

## 2024-03-14 NOTE — PROGRESS NOTES
Di Randle 83 y.o.  HPI    The patient and her family all attend the appointment today.  The patient's memory is essentially stable.  The patient was diagnosed with hearing loss in currently has hearing aids but she is not wearing them on a consistent basis.  The patient is compliant with Aricept and tolerating the 5 mg dosage.  The patient is somewhat anxious about her diagnosis.  She will get agitated at times and this usually is in a context where someone disagrees with her or someone is trying to correct her.  The patient currently lives alone.  The family has banda of  for healthcare and finances and family is not sure if she has a living well.  The patient's workup for the reversible cause of dementia was normal.  The patient's EEG was normal.  The patient's MRI of the brain shows small vessel changes and  Mild disproportionate ventriculomegaly that appears to be more related to central volume loss versus normal pressure hydrocephalus.  I did review the images of the MRI with the patient and her family.  The patient had neuropsychiatric testing that was consistent with a major neurocognitive disorder that is most likely secondary to Alzheimer's disease.  The neuropsychologist also recommended that the patient refrain from driving.    Review of Systems   Neurological:         Memory loss   All other systems reviewed and are negative.       Patient Active Problem List   Diagnosis    Abnormal echocardiogram    Aortic valve regurgitation, acquired    Cervicalgia    Coronary artery disease    Dysuria    Essential hypertension, benign    H/O four vessel coronary artery bypass graft    Hyperlipidemia    Nocturnal leg cramps    Pelvic organ prolapse quantification stage 2 cystocele    Premature atrial beats    Urinary frequency    Vaginal atrophy    Vaginal vault prolapse        Past Medical History:   Diagnosis Date    Personal history of other diseases of the circulatory system      History of coronary artery disease    Personal history of other diseases of the circulatory system     History of cardiac disorder    Personal history of other diseases of the circulatory system 12/06/2018    History of coronary artery disease    Personal history of other endocrine, nutritional and metabolic disease     History of hyperlipidemia    Personal history of other specified conditions     History of palpitations    Personal history of transient ischemic attack (TIA), and cerebral infarction without residual deficits     History of transient cerebral ischemia        Past Surgical History:   Procedure Laterality Date    CORONARY ARTERY BYPASS GRAFT  04/04/2018    CABG    OTHER SURGICAL HISTORY  04/27/2018    Surgery    OTHER SURGICAL HISTORY  12/03/2020    Hysterectomy    OTHER SURGICAL HISTORY  12/03/2020    Lumpectomy        Social History     Socioeconomic History    Marital status:      Spouse name: Not on file    Number of children: Not on file    Years of education: Not on file    Highest education level: Not on file   Occupational History    Not on file   Tobacco Use    Smoking status: Former     Types: Cigarettes     Passive exposure: Past    Smokeless tobacco: Never   Vaping Use    Vaping Use: Never used   Substance and Sexual Activity    Alcohol use: Not Currently    Drug use: Never    Sexual activity: Not on file   Other Topics Concern    Not on file   Social History Narrative    Not on file     Social Determinants of Health     Financial Resource Strain: Not on file   Food Insecurity: No Food Insecurity (1/23/2024)    Hunger Vital Sign     Worried About Running Out of Food in the Last Year: Never true     Ran Out of Food in the Last Year: Never true   Transportation Needs: Not on file   Physical Activity: Not on file   Stress: Not on file   Social Connections: Not on file   Intimate Partner Violence: Not on file   Housing Stability: Not on file        Family History   Problem Relation Name  "Age of Onset    Heart failure Mother      Cancer Father          Current Outpatient Medications   Medication Instructions    albuterol (ProAir HFA) 90 mcg/actuation inhaler 2 puffs, inhalation, Every 4 hours RT    aspirin 81 mg EC tablet 1 tablet, oral, Daily    calcium citrate/vitamin D2 (MONIKA-CITRATE ORAL) 1 tablet, oral, Daily    docosahexaenoic acid/epa (FISH OIL ORAL) Use as directed     donepezil (ARICEPT) 5 mg, oral, Nightly    estradiol (Estrace) 0.01 % (0.1 mg/gram) vaginal cream Use a 1/2 gram in the vagina twice weekly at bedtime    glucosamine/chondr grove A sod (glucosamine-chondroitin) 1,500-1,200 mg/30 mL liquid Glucosamine-Chondroitin 0911-4459 MG/30ML Oral Liquid   Refills: 0       Active    losartan (Cozaar) 50 mg tablet 1 tablet, oral, Daily    rosuvastatin (CRESTOR) 40 mg, oral, Daily        Allergies   Allergen Reactions    Atorvastatin GI Upset, Confusion and Other    Amoxicillin Unknown    Codeine Unknown        Objective  /78   Pulse 72   Temp 36.4 °C (97.6 °F)   Resp 16   Ht 1.6 m (5' 3\")   Wt 46.6 kg (102 lb 12.8 oz)   BMI 18.21 kg/m²    GENERAL APPEARANCE:  No distress, alert and cooperative.     MENTAL STATE:  Orientation was normal to time, place and person.  The patient has mild confusion.  The patient is unable to remember any objects out of 3 at 5 minutes. Attention span and concentration were normal. Language testing was normal for comprehension, repetition, expression, and naming. Calculation was intact. The patient could correctly interpret a picture, and copy a diagram. General fund of knowledge was intact.    CRANIAL NERVES:  Cranial nerves were normal.      CN 2- Visual Acuity  OD: 20/20 (corrected)   OS: 20/20 (corrected); visual fields full to confrontation.      CN 3, 4, 6-  Pupils round, 4 mm in diameter, equally reactive to light. No ptosis. EOMs normal alignment, full range of movement, no nystagmus     CN 5- Facial sensation intact bilaterally. Normal corneal " reflexes.      CN 7- Normal and symmetric facial strength. Nasolabial folds symmetric.     CN 8- The patient has poor hearing bilaterally.     CN 9- Palate elevates symmetrically. Normal gag reflex.      CN 11- Normal strength of shoulder shrug and neck turning      CN 12- Tongue midline, with normal bulk and strength; no fasciculations.     MOTOR:  Motor exam was normal. Muscle bulk and tone were normal in both upper and lower extremities. Muscle strength was 5/5 in distal and proximal muscles in both upper and lower extremities. No fasciculations, tremor or other abnormal movements were present.     GAIT: Station was stable with a normal base and negative Romberg sign. Gait was stable with a normal arm swing and speed. No ataxia, shuffling, steppage or waddling was noted. Tandem gait was intact. Postural reflexes were normal.     Assessment/Plan   The patient is essentially stable from a neurological standpoint with exception that at times she gets agitated.  I will increase her donepezil to 10 mg p.o. daily.  I will hold on starting memantine at this time.  If the patient becomes agitated on a consistent basis and I would certainly consider Seroquel 12.5 mg p.o. twice daily.    The patient should continue all of her medicines and stroke risk factor modification.  The patient should try to stay as active as she can both mentally and physically.  We will continue to follow her for sleep and safety issues.  The patient needs to continue using her hearing aids on a consistent basis.  The patient does need a living well.  The patient should stop driving.  I discussed all these issues in detail with the patient and her family and answered all her questions.  The patient follow-up with me in 6 months.

## 2024-04-18 PROBLEM — G47.62 NOCTURNAL LEG CRAMPS: Status: RESOLVED | Noted: 2023-10-21 | Resolved: 2024-04-18

## 2024-04-18 PROBLEM — R07.89 ATYPICAL CHEST PAIN: Status: ACTIVE | Noted: 2024-04-18

## 2024-04-18 PROBLEM — R30.0 DYSURIA: Status: RESOLVED | Noted: 2023-10-21 | Resolved: 2024-04-18

## 2024-04-18 PROBLEM — F41.9 ANXIETY DISORDER: Status: ACTIVE | Noted: 2024-04-18

## 2024-04-18 PROBLEM — I35.1 AORTIC VALVE REGURGITATION, ACQUIRED: Status: RESOLVED | Noted: 2023-10-21 | Resolved: 2024-04-18

## 2024-04-18 PROBLEM — F03.90 DEMENTIA (MULTI): Status: ACTIVE | Noted: 2024-04-18

## 2024-04-25 ENCOUNTER — OFFICE VISIT (OUTPATIENT)
Dept: OBSTETRICS AND GYNECOLOGY | Facility: CLINIC | Age: 84
End: 2024-04-25
Payer: MEDICARE

## 2024-04-25 VITALS
WEIGHT: 103.8 LBS | HEART RATE: 82 BPM | DIASTOLIC BLOOD PRESSURE: 72 MMHG | SYSTOLIC BLOOD PRESSURE: 132 MMHG | RESPIRATION RATE: 16 BRPM | OXYGEN SATURATION: 99 % | TEMPERATURE: 98.2 F | HEIGHT: 63 IN | BODY MASS INDEX: 18.39 KG/M2

## 2024-04-25 DIAGNOSIS — R31.21 ASYMPTOMATIC MICROSCOPIC HEMATURIA: ICD-10-CM

## 2024-04-25 DIAGNOSIS — Z46.89 PESSARY MAINTENANCE: ICD-10-CM

## 2024-04-25 DIAGNOSIS — N81.9 VAGINAL VAULT PROLAPSE: Primary | ICD-10-CM

## 2024-04-25 LAB
APPEARANCE UR: ABNORMAL
BILIRUB UR STRIP.AUTO-MCNC: NEGATIVE MG/DL
COLOR UR: ABNORMAL
GLUCOSE UR STRIP.AUTO-MCNC: NEGATIVE MG/DL
KETONES UR STRIP.AUTO-MCNC: NEGATIVE MG/DL
LEUKOCYTE ESTERASE UR QL STRIP.AUTO: ABNORMAL
MUCOUS THREADS #/AREA URNS AUTO: NORMAL /LPF
NITRITE UR QL STRIP.AUTO: NEGATIVE
PH UR STRIP.AUTO: 5 [PH]
POC APPEARANCE, URINE: CLEAR
POC BILIRUBIN, URINE: ABNORMAL
POC BLOOD, URINE: ABNORMAL
POC COLOR, URINE: YELLOW
POC GLUCOSE, URINE: NEGATIVE MG/DL
POC KETONES, URINE: NEGATIVE MG/DL
POC LEUKOCYTES, URINE: ABNORMAL
POC NITRITE,URINE: NEGATIVE
POC PH, URINE: 5.5 PH
POC PROTEIN, URINE: NEGATIVE MG/DL
POC SPECIFIC GRAVITY, URINE: >=1.03
POC UROBILINOGEN, URINE: 0.2 EU/DL
PROT UR STRIP.AUTO-MCNC: NEGATIVE MG/DL
RBC # UR STRIP.AUTO: NEGATIVE /UL
RBC #/AREA URNS AUTO: NORMAL /HPF
SP GR UR STRIP.AUTO: 1.02
UROBILINOGEN UR STRIP.AUTO-MCNC: <2 MG/DL
WBC #/AREA URNS AUTO: NORMAL /HPF

## 2024-04-25 PROCEDURE — 1126F AMNT PAIN NOTED NONE PRSNT: CPT | Performed by: NURSE PRACTITIONER

## 2024-04-25 PROCEDURE — 1160F RVW MEDS BY RX/DR IN RCRD: CPT | Performed by: NURSE PRACTITIONER

## 2024-04-25 PROCEDURE — 1159F MED LIST DOCD IN RCRD: CPT | Performed by: NURSE PRACTITIONER

## 2024-04-25 PROCEDURE — 81003 URINALYSIS AUTO W/O SCOPE: CPT | Mod: QW,59 | Performed by: NURSE PRACTITIONER

## 2024-04-25 PROCEDURE — 87086 URINE CULTURE/COLONY COUNT: CPT | Mod: PARLAB | Performed by: NURSE PRACTITIONER

## 2024-04-25 PROCEDURE — 81001 URINALYSIS AUTO W/SCOPE: CPT | Performed by: NURSE PRACTITIONER

## 2024-04-25 PROCEDURE — 3075F SYST BP GE 130 - 139MM HG: CPT | Performed by: NURSE PRACTITIONER

## 2024-04-25 PROCEDURE — 3078F DIAST BP <80 MM HG: CPT | Performed by: NURSE PRACTITIONER

## 2024-04-25 PROCEDURE — 99213 OFFICE O/P EST LOW 20 MIN: CPT | Performed by: NURSE PRACTITIONER

## 2024-04-25 PROCEDURE — 1036F TOBACCO NON-USER: CPT | Performed by: NURSE PRACTITIONER

## 2024-04-25 ASSESSMENT — ENCOUNTER SYMPTOMS
DEPRESSION: 0
LOSS OF SENSATION IN FEET: 0
OCCASIONAL FEELINGS OF UNSTEADINESS: 0

## 2024-04-25 ASSESSMENT — LIFESTYLE VARIABLES
HOW OFTEN DO YOU HAVE A DRINK CONTAINING ALCOHOL: NEVER
SKIP TO QUESTIONS 9-10: 1
AUDIT-C TOTAL SCORE: 0
HOW OFTEN DO YOU HAVE SIX OR MORE DRINKS ON ONE OCCASION: NEVER
HOW MANY STANDARD DRINKS CONTAINING ALCOHOL DO YOU HAVE ON A TYPICAL DAY: PATIENT DOES NOT DRINK

## 2024-04-25 ASSESSMENT — PAIN SCALES - GENERAL: PAINLEVEL: 0-NO PAIN

## 2024-04-25 NOTE — PROGRESS NOTES
Pessary Check    This is a 83 y.o. with a #3 ring with support pessary here for a routine pessary check.    Date of last check: 1/23/2024    Today she reports:  Pessary comfortable: Yes  Vaginal bleeding:No  Abnormal vaginal discharge:No  Using vaginal estrogen:No  Asymptomatic for UTI however daughter requests sample.     Exam:  Physical Exam  Constitutional:       Appearance: Normal appearance.   Genitourinary:      Vulva normal.      No lesions in the vagina.      Vaginal erythema present.      No vaginal ulceration or granulation tissue.   Rectum:      No tenderness.   Pulmonary:      Effort: Pulmonary effort is normal.   Neurological:      General: No focal deficit present.      Mental Status: She is alert and oriented to person, place, and time.   Psychiatric:         Mood and Affect: Mood normal.         Behavior: Behavior normal.         Thought Content: Thought content normal.         Judgment: Judgment normal.   Vitals reviewed.          Procedure:   Pessary position on presentation: Normal  Pessary removed and cleaned without difficulty  Speculum exam: Vaginal mucosa was examined for the presence of irritation, trauma and/or bleeding   Erosions: No   Vaginal atrophy: Yes   Silver nitrate applied :No  Pessary replaced without difficulty.    Assessment/Plan:    Kimberly Randle is a 83 y.o. being treated for stage 2 cystocele with pessary management.  Comorbidities include: HTN and CAD s/p CABG 20 years ago.                      The patient is satisfied with the pessary and plans to continue use.   Risks, alternative and routine maintenance reviewed with patient.  Continue low dose vaginal estrogen to help prevent erosions with pessary.     UA +leukocytes and blood, elmer UA and culture ordered    She will return to the office in 2-3 months for recheck or sooner should she have any problems.    All questions and concerns were answered and addressed.

## 2024-04-26 ENCOUNTER — TELEPHONE (OUTPATIENT)
Dept: OBSTETRICS AND GYNECOLOGY | Facility: CLINIC | Age: 84
End: 2024-04-26
Payer: MEDICARE

## 2024-04-26 LAB — BACTERIA UR CULT: NO GROWTH

## 2024-04-26 NOTE — TELEPHONE ENCOUNTER
----- Message from Elaine Cunningham sent at 4/26/2024 12:45 PM EDT -----    ----- Message -----  From: SIERRA Stone  Sent: 4/26/2024  12:40 PM EDT  To: Do [id number]    Hi, do you guys mind calling the daughter and telling her no UTI? Thank you!!!!    4/26/24 1330   Pt Donna thomas is aware of results.

## 2024-05-07 ENCOUNTER — OFFICE VISIT (OUTPATIENT)
Dept: CARDIOLOGY | Facility: CLINIC | Age: 84
End: 2024-05-07
Payer: MEDICARE

## 2024-05-07 VITALS
SYSTOLIC BLOOD PRESSURE: 110 MMHG | BODY MASS INDEX: 18.25 KG/M2 | WEIGHT: 103 LBS | DIASTOLIC BLOOD PRESSURE: 50 MMHG | HEART RATE: 81 BPM | HEIGHT: 63 IN | OXYGEN SATURATION: 96 %

## 2024-05-07 DIAGNOSIS — F03.B0 MODERATE DEMENTIA, UNSPECIFIED DEMENTIA TYPE, UNSPECIFIED WHETHER BEHAVIORAL, PSYCHOTIC, OR MOOD DISTURBANCE OR ANXIETY (MULTI): ICD-10-CM

## 2024-05-07 DIAGNOSIS — R07.89 ATYPICAL CHEST PAIN: ICD-10-CM

## 2024-05-07 DIAGNOSIS — E78.5 HYPERLIPIDEMIA, UNSPECIFIED HYPERLIPIDEMIA TYPE: ICD-10-CM

## 2024-05-07 DIAGNOSIS — Z95.1 H/O FOUR VESSEL CORONARY ARTERY BYPASS GRAFT: ICD-10-CM

## 2024-05-07 DIAGNOSIS — I49.1 PREMATURE ATRIAL BEATS: ICD-10-CM

## 2024-05-07 DIAGNOSIS — I25.10 CORONARY ARTERY DISEASE INVOLVING NATIVE CORONARY ARTERY OF NATIVE HEART WITHOUT ANGINA PECTORIS: Primary | ICD-10-CM

## 2024-05-07 DIAGNOSIS — I10 ESSENTIAL HYPERTENSION, BENIGN: ICD-10-CM

## 2024-05-07 PROCEDURE — 3078F DIAST BP <80 MM HG: CPT | Performed by: INTERNAL MEDICINE

## 2024-05-07 PROCEDURE — 99214 OFFICE O/P EST MOD 30 MIN: CPT | Performed by: INTERNAL MEDICINE

## 2024-05-07 PROCEDURE — 1159F MED LIST DOCD IN RCRD: CPT | Performed by: INTERNAL MEDICINE

## 2024-05-07 PROCEDURE — 1160F RVW MEDS BY RX/DR IN RCRD: CPT | Performed by: INTERNAL MEDICINE

## 2024-05-07 PROCEDURE — 3074F SYST BP LT 130 MM HG: CPT | Performed by: INTERNAL MEDICINE

## 2024-05-07 PROCEDURE — 1036F TOBACCO NON-USER: CPT | Performed by: INTERNAL MEDICINE

## 2024-05-07 NOTE — PROGRESS NOTES
Subjective   Kimberly Randle is a 83 y.o. female.    Chief Complaint:  Follow-up coronary disease, coronary artery bypass grafting.    HPI    She has been about the same since her last visit.  She continues to live on her own.  She has a supportive family.  Denies any recent falls.  Has been seen and evaluated by the neurology service.  Aricept was increased to 10 mg daily.  Family does describe that at times she gets somewhat agitated.  She is very frustrated about her memory loss.    In May 2023 she presented with symptoms of changing mental status.  We did do a CT of the brain which showed significant small vessel disease.  She was also seen and evaluated by the neurology service.       The patient's   in 2022 when he became suddenly unresponsive at home.  He was found to be in ventricular fibrillatory arrest and could not be resuscitated.     A stress thallium study performed in 2018 showed a fixed apical defect with an ejection fraction of 74%.     Her cardiac history is significant for coronary artery bypass grafting performed in  with a left internal mammary graft to the anterior descending, she has a right internal mammary graft to the right coronary artery. She has a radial artery graft to the diagonal branch and circumflex coronary artery.     Other medical problems including history of hyperlipidemia. She has a history of moderate aortic regurgitation.        Allergies  Medication    · atorvastatin   Adverse Reaction; Confusion; Gatrointestinal upset; Severe stomach cramping; Recorded By: Ana Chang; 2022 5:01:02 PM   · codeine   Recorded By: Majo Miller; 2018 1:52:11 PM     Family History  Mother    · Family history of congestive heart failure (V17.49) (Z82.49)     Social History  Problems    · Does not use illicit drugs (V49.89) (Z78.9)   · Former smoker (V15.82) (Z87.891)   · Occasional alcohol use        Review of Systems   Constitutional: Positive for  "malaise/fatigue.   Cardiovascular:  Positive for dyspnea on exertion.   Musculoskeletal:  Positive for arthritis.   Neurological:  Positive for difficulty with concentration.        Visit Vitals  /50 (BP Location: Right arm)   Pulse 81   Ht 1.6 m (5' 3\")   Wt 46.7 kg (103 lb)   SpO2 96%   BMI 18.25 kg/m²   Smoking Status Former   BSA 1.44 m²        Objective     Constitutional:       Appearance: Not in distress.   Neck:      Vascular: JVD normal.   Pulmonary:      Breath sounds: Normal breath sounds.   Cardiovascular:      Normal rate. Regular rhythm. Normal S1. Normal S2.       Murmurs: There is no murmur.      No gallop.    Pulses:     Intact distal pulses.   Edema:     Peripheral edema absent.   Abdominal:      General: There is no distension.      Palpations: Abdomen is soft.   Neurological:      Mental Status: Alert.         Lab Review:   Lab Results   Component Value Date     05/24/2023    K 4.4 05/24/2023     05/24/2023    CO2 28 05/24/2023    BUN 20 05/24/2023    CREATININE 1.05 05/24/2023    GLUCOSE 104 (H) 05/24/2023    CALCIUM 9.5 05/24/2023     Lab Results   Component Value Date    CHOL 156 11/23/2022    TRIG 125 11/23/2022    HDL 61.4 11/23/2022       Assessment:    1.  Coronary artery disease.  Status post coronary artery bypass grafting in 1998 with arterial grass.  No anginal symptoms.  Conservative medical management given her age and development of dementia.  However she developed an acute coronary syndrome she would be a candidate for acute intervention.    2.  Hypertension.  Blood pressures are low.  Will discontinue losartan.    3.  Hyperlipidemia.  Cholesterol 179, HDL 57, .  We have increased her rosuvastatin to 40 mg daily.    4.  Social issues.  We have encouraged her and her family to look into assisted living.  "

## 2024-05-07 NOTE — PATIENT INSTRUCTIONS
Stop losartan.    Prevagen does not work!!    No changes in your other medications.    [facility] gets good reviews from my patients.

## 2024-05-12 ENCOUNTER — APPOINTMENT (OUTPATIENT)
Dept: CARDIOLOGY | Facility: HOSPITAL | Age: 84
End: 2024-05-12
Payer: MEDICARE

## 2024-05-12 ENCOUNTER — HOSPITAL ENCOUNTER (OUTPATIENT)
Facility: HOSPITAL | Age: 84
Setting detail: OBSERVATION
Discharge: HOME | End: 2024-05-14
Attending: EMERGENCY MEDICINE | Admitting: INTERNAL MEDICINE
Payer: MEDICARE

## 2024-05-12 ENCOUNTER — APPOINTMENT (OUTPATIENT)
Dept: RADIOLOGY | Facility: HOSPITAL | Age: 84
End: 2024-05-12
Payer: MEDICARE

## 2024-05-12 DIAGNOSIS — R11.10 VOMITING, UNSPECIFIED VOMITING TYPE, UNSPECIFIED WHETHER NAUSEA PRESENT: ICD-10-CM

## 2024-05-12 DIAGNOSIS — R55 SYNCOPE, UNSPECIFIED SYNCOPE TYPE: Primary | ICD-10-CM

## 2024-05-12 LAB
ALBUMIN SERPL BCP-MCNC: 4.2 G/DL (ref 3.4–5)
ALP SERPL-CCNC: 69 U/L (ref 33–136)
ALT SERPL W P-5'-P-CCNC: 23 U/L (ref 7–45)
ANION GAP SERPL CALC-SCNC: 12 MMOL/L (ref 10–20)
AORTIC VALVE MEAN GRADIENT: 2 MMHG
AORTIC VALVE PEAK VELOCITY: 1.04 M/S
AST SERPL W P-5'-P-CCNC: 27 U/L (ref 9–39)
AV PEAK GRADIENT: 4.3 MMHG
AVA (PEAK VEL): 2.54 CM2
AVA (VTI): 2.31 CM2
BASOPHILS # BLD AUTO: 0.03 X10*3/UL (ref 0–0.1)
BASOPHILS NFR BLD AUTO: 0.3 %
BILIRUB SERPL-MCNC: 0.6 MG/DL (ref 0–1.2)
BUN SERPL-MCNC: 29 MG/DL (ref 6–23)
CALCIUM SERPL-MCNC: 10 MG/DL (ref 8.6–10.3)
CARDIAC TROPONIN I PNL SERPL HS: 7 NG/L (ref 0–13)
CARDIAC TROPONIN I PNL SERPL HS: 7 NG/L (ref 0–13)
CHLORIDE SERPL-SCNC: 103 MMOL/L (ref 98–107)
CO2 SERPL-SCNC: 25 MMOL/L (ref 21–32)
CREAT SERPL-MCNC: 0.82 MG/DL (ref 0.5–1.05)
EGFRCR SERPLBLD CKD-EPI 2021: 70 ML/MIN/1.73M*2
EJECTION FRACTION APICAL 4 CHAMBER: 59.7
EOSINOPHIL # BLD AUTO: 0.03 X10*3/UL (ref 0–0.4)
EOSINOPHIL NFR BLD AUTO: 0.3 %
ERYTHROCYTE [DISTWIDTH] IN BLOOD BY AUTOMATED COUNT: 13.4 % (ref 11.5–14.5)
GLUCOSE SERPL-MCNC: 169 MG/DL (ref 74–99)
HCT VFR BLD AUTO: 39.1 % (ref 36–46)
HGB BLD-MCNC: 13.3 G/DL (ref 12–16)
IMM GRANULOCYTES # BLD AUTO: 0.04 X10*3/UL (ref 0–0.5)
IMM GRANULOCYTES NFR BLD AUTO: 0.5 % (ref 0–0.9)
LEFT VENTRICLE INTERNAL DIMENSION DIASTOLE: 4.4 CM (ref 3.5–6)
LEFT VENTRICULAR OUTFLOW TRACT DIAMETER: 1.8 CM
LIPASE SERPL-CCNC: 35 U/L (ref 9–82)
LYMPHOCYTES # BLD AUTO: 1.42 X10*3/UL (ref 0.8–3)
LYMPHOCYTES NFR BLD AUTO: 16.3 %
MAGNESIUM SERPL-MCNC: 1.71 MG/DL (ref 1.6–2.4)
MCH RBC QN AUTO: 31.4 PG (ref 26–34)
MCHC RBC AUTO-ENTMCNC: 34 G/DL (ref 32–36)
MCV RBC AUTO: 92 FL (ref 80–100)
MITRAL VALVE E/A RATIO: 0.86
MONOCYTES # BLD AUTO: 0.36 X10*3/UL (ref 0.05–0.8)
MONOCYTES NFR BLD AUTO: 4.1 %
NEUTROPHILS # BLD AUTO: 6.82 X10*3/UL (ref 1.6–5.5)
NEUTROPHILS NFR BLD AUTO: 78.5 %
NRBC BLD-RTO: 0 /100 WBCS (ref 0–0)
PLATELET # BLD AUTO: 171 X10*3/UL (ref 150–450)
POTASSIUM SERPL-SCNC: 3.7 MMOL/L (ref 3.5–5.3)
PROT SERPL-MCNC: 6.4 G/DL (ref 6.4–8.2)
RBC # BLD AUTO: 4.23 X10*6/UL (ref 4–5.2)
RIGHT VENTRICLE PEAK SYSTOLIC PRESSURE: 26.8 MMHG
SODIUM SERPL-SCNC: 136 MMOL/L (ref 136–145)
TRICUSPID ANNULAR PLANE SYSTOLIC EXCURSION: 1.2 CM
WBC # BLD AUTO: 8.7 X10*3/UL (ref 4.4–11.3)

## 2024-05-12 PROCEDURE — 71045 X-RAY EXAM CHEST 1 VIEW: CPT | Performed by: RADIOLOGY

## 2024-05-12 PROCEDURE — 2500000004 HC RX 250 GENERAL PHARMACY W/ HCPCS (ALT 636 FOR OP/ED): Performed by: EMERGENCY MEDICINE

## 2024-05-12 PROCEDURE — 36415 COLL VENOUS BLD VENIPUNCTURE: CPT | Performed by: EMERGENCY MEDICINE

## 2024-05-12 PROCEDURE — 71045 X-RAY EXAM CHEST 1 VIEW: CPT

## 2024-05-12 PROCEDURE — 93321 DOPPLER ECHO F-UP/LMTD STD: CPT | Performed by: STUDENT IN AN ORGANIZED HEALTH CARE EDUCATION/TRAINING PROGRAM

## 2024-05-12 PROCEDURE — 2500000004 HC RX 250 GENERAL PHARMACY W/ HCPCS (ALT 636 FOR OP/ED): Performed by: NURSE PRACTITIONER

## 2024-05-12 PROCEDURE — 2500000006 HC RX 250 W HCPCS SELF ADMINISTERED DRUGS (ALT 637 FOR ALL PAYERS)

## 2024-05-12 PROCEDURE — 83735 ASSAY OF MAGNESIUM: CPT

## 2024-05-12 PROCEDURE — 80053 COMPREHEN METABOLIC PANEL: CPT | Performed by: EMERGENCY MEDICINE

## 2024-05-12 PROCEDURE — 96374 THER/PROPH/DIAG INJ IV PUSH: CPT

## 2024-05-12 PROCEDURE — G0378 HOSPITAL OBSERVATION PER HR: HCPCS

## 2024-05-12 PROCEDURE — 83690 ASSAY OF LIPASE: CPT | Performed by: EMERGENCY MEDICINE

## 2024-05-12 PROCEDURE — 84484 ASSAY OF TROPONIN QUANT: CPT | Performed by: EMERGENCY MEDICINE

## 2024-05-12 PROCEDURE — 93005 ELECTROCARDIOGRAM TRACING: CPT

## 2024-05-12 PROCEDURE — 2500000001 HC RX 250 WO HCPCS SELF ADMINISTERED DRUGS (ALT 637 FOR MEDICARE OP)

## 2024-05-12 PROCEDURE — 2500000004 HC RX 250 GENERAL PHARMACY W/ HCPCS (ALT 636 FOR OP/ED): Performed by: INTERNAL MEDICINE

## 2024-05-12 PROCEDURE — 93325 DOPPLER ECHO COLOR FLOW MAPG: CPT

## 2024-05-12 PROCEDURE — 2500000005 HC RX 250 GENERAL PHARMACY W/O HCPCS: Performed by: EMERGENCY MEDICINE

## 2024-05-12 PROCEDURE — 93308 TTE F-UP OR LMTD: CPT | Performed by: STUDENT IN AN ORGANIZED HEALTH CARE EDUCATION/TRAINING PROGRAM

## 2024-05-12 PROCEDURE — 85025 COMPLETE CBC W/AUTO DIFF WBC: CPT | Performed by: EMERGENCY MEDICINE

## 2024-05-12 PROCEDURE — 96361 HYDRATE IV INFUSION ADD-ON: CPT

## 2024-05-12 PROCEDURE — 96375 TX/PRO/DX INJ NEW DRUG ADDON: CPT

## 2024-05-12 PROCEDURE — 99285 EMERGENCY DEPT VISIT HI MDM: CPT | Mod: 25

## 2024-05-12 PROCEDURE — 70450 CT HEAD/BRAIN W/O DYE: CPT

## 2024-05-12 PROCEDURE — 2500000001 HC RX 250 WO HCPCS SELF ADMINISTERED DRUGS (ALT 637 FOR MEDICARE OP): Performed by: NURSE PRACTITIONER

## 2024-05-12 PROCEDURE — 93325 DOPPLER ECHO COLOR FLOW MAPG: CPT | Performed by: STUDENT IN AN ORGANIZED HEALTH CARE EDUCATION/TRAINING PROGRAM

## 2024-05-12 PROCEDURE — 96376 TX/PRO/DX INJ SAME DRUG ADON: CPT

## 2024-05-12 RX ORDER — DONEPEZIL HYDROCHLORIDE 10 MG/1
5 TABLET, FILM COATED ORAL NIGHTLY
COMMUNITY
Start: 2024-03-14

## 2024-05-12 RX ORDER — ONDANSETRON HYDROCHLORIDE 2 MG/ML
4 INJECTION, SOLUTION INTRAVENOUS EVERY 4 HOURS PRN
Status: DISCONTINUED | OUTPATIENT
Start: 2024-05-12 | End: 2024-05-12

## 2024-05-12 RX ORDER — POTASSIUM CHLORIDE 20 MEQ/1
20 TABLET, EXTENDED RELEASE ORAL ONCE
Status: COMPLETED | OUTPATIENT
Start: 2024-05-12 | End: 2024-05-12

## 2024-05-12 RX ORDER — ONDANSETRON 4 MG/1
4 TABLET, FILM COATED ORAL EVERY 8 HOURS PRN
Status: DISCONTINUED | OUTPATIENT
Start: 2024-05-12 | End: 2024-05-14 | Stop reason: HOSPADM

## 2024-05-12 RX ORDER — LORAZEPAM 2 MG/ML
1 INJECTION INTRAMUSCULAR ONCE
Status: COMPLETED | OUTPATIENT
Start: 2024-05-12 | End: 2024-05-12

## 2024-05-12 RX ORDER — ASPIRIN 81 MG/1
81 TABLET ORAL DAILY
Status: DISCONTINUED | OUTPATIENT
Start: 2024-05-12 | End: 2024-05-14 | Stop reason: HOSPADM

## 2024-05-12 RX ORDER — ROSUVASTATIN CALCIUM 10 MG/1
40 TABLET, COATED ORAL DAILY
Status: DISCONTINUED | OUTPATIENT
Start: 2024-05-12 | End: 2024-05-14 | Stop reason: HOSPADM

## 2024-05-12 RX ORDER — ONDANSETRON 4 MG/1
4 TABLET, FILM COATED ORAL EVERY 8 HOURS PRN
Status: DISCONTINUED | OUTPATIENT
Start: 2024-05-12 | End: 2024-05-12

## 2024-05-12 RX ORDER — PANTOPRAZOLE SODIUM 40 MG/10ML
40 INJECTION, POWDER, LYOPHILIZED, FOR SOLUTION INTRAVENOUS
Status: DISCONTINUED | OUTPATIENT
Start: 2024-05-13 | End: 2024-05-14 | Stop reason: HOSPADM

## 2024-05-12 RX ORDER — ONDANSETRON 4 MG/1
4 TABLET, ORALLY DISINTEGRATING ORAL ONCE
Status: COMPLETED | OUTPATIENT
Start: 2024-05-12 | End: 2024-05-12

## 2024-05-12 RX ORDER — ONDANSETRON 4 MG/1
4 TABLET, ORALLY DISINTEGRATING ORAL EVERY 6 HOURS PRN
Qty: 12 TABLET | Refills: 0 | Status: SHIPPED | OUTPATIENT
Start: 2024-05-12 | End: 2024-05-15

## 2024-05-12 RX ORDER — ESTRADIOL 0.1 MG/G
0.5 CREAM VAGINAL 2 TIMES WEEKLY
Status: DISCONTINUED | OUTPATIENT
Start: 2024-05-14 | End: 2024-05-14 | Stop reason: HOSPADM

## 2024-05-12 RX ORDER — MULTIVIT-MIN/IRON FUM/FOLIC AC 7.5 MG-4
1 TABLET ORAL DAILY
COMMUNITY

## 2024-05-12 RX ORDER — MULTIVIT-MIN/IRON FUM/FOLIC AC 7.5 MG-4
1 TABLET ORAL DAILY
Status: DISCONTINUED | OUTPATIENT
Start: 2024-05-12 | End: 2024-05-14 | Stop reason: HOSPADM

## 2024-05-12 RX ORDER — ACETAMINOPHEN 325 MG/1
650 TABLET ORAL EVERY 4 HOURS PRN
Status: DISCONTINUED | OUTPATIENT
Start: 2024-05-12 | End: 2024-05-14 | Stop reason: HOSPADM

## 2024-05-12 RX ORDER — ONDANSETRON HYDROCHLORIDE 2 MG/ML
4 INJECTION, SOLUTION INTRAVENOUS EVERY 4 HOURS PRN
Status: DISCONTINUED | OUTPATIENT
Start: 2024-05-12 | End: 2024-05-14 | Stop reason: HOSPADM

## 2024-05-12 RX ORDER — PANTOPRAZOLE SODIUM 40 MG/1
40 TABLET, DELAYED RELEASE ORAL
Status: DISCONTINUED | OUTPATIENT
Start: 2024-05-13 | End: 2024-05-14 | Stop reason: HOSPADM

## 2024-05-12 RX ORDER — ONDANSETRON HYDROCHLORIDE 2 MG/ML
4 INJECTION, SOLUTION INTRAVENOUS EVERY 8 HOURS PRN
Status: DISCONTINUED | OUTPATIENT
Start: 2024-05-12 | End: 2024-05-12

## 2024-05-12 RX ORDER — DONEPEZIL HYDROCHLORIDE 5 MG/1
5 TABLET, FILM COATED ORAL NIGHTLY
Status: DISCONTINUED | OUTPATIENT
Start: 2024-05-12 | End: 2024-05-14 | Stop reason: HOSPADM

## 2024-05-12 RX ORDER — ONDANSETRON HYDROCHLORIDE 2 MG/ML
4 INJECTION, SOLUTION INTRAVENOUS ONCE
Status: COMPLETED | OUTPATIENT
Start: 2024-05-12 | End: 2024-05-12

## 2024-05-12 RX ORDER — ACETAMINOPHEN 500 MG
5 TABLET ORAL ONCE
Status: COMPLETED | OUTPATIENT
Start: 2024-05-12 | End: 2024-05-12

## 2024-05-12 RX ORDER — EPINEPHRINE 0.22MG
100 AEROSOL WITH ADAPTER (ML) INHALATION DAILY
COMMUNITY

## 2024-05-12 RX ADMIN — Medication 5 MG: at 22:51

## 2024-05-12 RX ADMIN — ONDANSETRON 4 MG: 2 INJECTION INTRAMUSCULAR; INTRAVENOUS at 10:58

## 2024-05-12 RX ADMIN — SODIUM CHLORIDE 500 ML: 9 INJECTION, SOLUTION INTRAVENOUS at 10:59

## 2024-05-12 RX ADMIN — ONDANSETRON 4 MG: 2 INJECTION, SOLUTION INTRAMUSCULAR; INTRAVENOUS at 03:25

## 2024-05-12 RX ADMIN — POTASSIUM CHLORIDE 20 MEQ: 1500 TABLET, EXTENDED RELEASE ORAL at 15:14

## 2024-05-12 RX ADMIN — ONDANSETRON 4 MG: 4 TABLET, ORALLY DISINTEGRATING ORAL at 06:52

## 2024-05-12 RX ADMIN — ASPIRIN 81 MG: 81 TABLET, COATED ORAL at 15:13

## 2024-05-12 RX ADMIN — Medication 1 TABLET: at 15:13

## 2024-05-12 RX ADMIN — DONEPEZIL HYDROCHLORIDE 5 MG: 5 TABLET, FILM COATED ORAL at 22:13

## 2024-05-12 RX ADMIN — ONDANSETRON 4 MG: 2 INJECTION INTRAMUSCULAR; INTRAVENOUS at 15:14

## 2024-05-12 RX ADMIN — SODIUM CHLORIDE 500 ML: 9 INJECTION, SOLUTION INTRAVENOUS at 03:25

## 2024-05-12 RX ADMIN — LORAZEPAM 1 MG: 2 INJECTION INTRAMUSCULAR; INTRAVENOUS at 22:51

## 2024-05-12 SDOH — SOCIAL STABILITY: SOCIAL INSECURITY: DOES ANYONE TRY TO KEEP YOU FROM HAVING/CONTACTING OTHER FRIENDS OR DOING THINGS OUTSIDE YOUR HOME?: NO

## 2024-05-12 SDOH — SOCIAL STABILITY: SOCIAL INSECURITY: HAVE YOU HAD ANY THOUGHTS OF HARMING ANYONE ELSE?: NO

## 2024-05-12 SDOH — SOCIAL STABILITY: SOCIAL INSECURITY: HAVE YOU HAD THOUGHTS OF HARMING ANYONE ELSE?: NO

## 2024-05-12 SDOH — SOCIAL STABILITY: SOCIAL INSECURITY: ABUSE: ADULT

## 2024-05-12 SDOH — HEALTH STABILITY: MENTAL HEALTH: EXPERIENCED ANY OF THE FOLLOWING LIFE EVENTS: DEATH OF FAMILY/FRIEND

## 2024-05-12 SDOH — SOCIAL STABILITY: SOCIAL INSECURITY: ARE THERE ANY APPARENT SIGNS OF INJURIES/BEHAVIORS THAT COULD BE RELATED TO ABUSE/NEGLECT?: NO

## 2024-05-12 SDOH — SOCIAL STABILITY: SOCIAL INSECURITY: ARE YOU OR HAVE YOU BEEN THREATENED OR ABUSED PHYSICALLY, EMOTIONALLY, OR SEXUALLY BY ANYONE?: NO

## 2024-05-12 SDOH — SOCIAL STABILITY: SOCIAL INSECURITY: POSSIBLE ABUSE REPORTED TO:: OTHER (COMMENT)

## 2024-05-12 SDOH — SOCIAL STABILITY: SOCIAL INSECURITY: DO YOU FEEL ANYONE HAS EXPLOITED OR TAKEN ADVANTAGE OF YOU FINANCIALLY OR OF YOUR PERSONAL PROPERTY?: NO

## 2024-05-12 SDOH — SOCIAL STABILITY: SOCIAL INSECURITY: HAS ANYONE EVER THREATENED TO HURT YOUR FAMILY OR YOUR PETS?: NO

## 2024-05-12 SDOH — SOCIAL STABILITY: SOCIAL INSECURITY: WERE YOU ABLE TO COMPLETE ALL THE BEHAVIORAL HEALTH SCREENINGS?: YES

## 2024-05-12 SDOH — SOCIAL STABILITY: SOCIAL INSECURITY: DO YOU FEEL UNSAFE GOING BACK TO THE PLACE WHERE YOU ARE LIVING?: NO

## 2024-05-12 ASSESSMENT — COGNITIVE AND FUNCTIONAL STATUS - GENERAL
DRESSING REGULAR UPPER BODY CLOTHING: A LITTLE
PERSONAL GROOMING: A LITTLE
TOILETING: A LITTLE
EATING MEALS: A LITTLE
WALKING IN HOSPITAL ROOM: A LITTLE
MOVING TO AND FROM BED TO CHAIR: A LITTLE
PATIENT BASELINE BEDBOUND: YES
TURNING FROM BACK TO SIDE WHILE IN FLAT BAD: A LITTLE
DAILY ACTIVITIY SCORE: 18
MOBILITY SCORE: 18
HELP NEEDED FOR BATHING: A LITTLE
CLIMB 3 TO 5 STEPS WITH RAILING: A LITTLE
STANDING UP FROM CHAIR USING ARMS: A LITTLE
MOVING FROM LYING ON BACK TO SITTING ON SIDE OF FLAT BED WITH BEDRAILS: A LITTLE
DRESSING REGULAR LOWER BODY CLOTHING: A LITTLE

## 2024-05-12 ASSESSMENT — LIFESTYLE VARIABLES
HOW OFTEN DO YOU HAVE 6 OR MORE DRINKS ON ONE OCCASION: NEVER
AUDIT-C TOTAL SCORE: 0
HOW OFTEN DO YOU HAVE A DRINK CONTAINING ALCOHOL: NEVER
PRESCIPTION_ABUSE_PAST_12_MONTHS: NO
SKIP TO QUESTIONS 9-10: 1
HOW MANY STANDARD DRINKS CONTAINING ALCOHOL DO YOU HAVE ON A TYPICAL DAY: PATIENT DOES NOT DRINK
SUBSTANCE_ABUSE_PAST_12_MONTHS: NO
AUDIT-C TOTAL SCORE: 0

## 2024-05-12 ASSESSMENT — ACTIVITIES OF DAILY LIVING (ADL)
HEARING - LEFT EAR: HEARING AID
WALKS IN HOME: NEEDS ASSISTANCE
BATHING: NEEDS ASSISTANCE
GROOMING: NEEDS ASSISTANCE
ADEQUATE_TO_COMPLETE_ADL: YES
TOILETING: NEEDS ASSISTANCE
PATIENT'S MEMORY ADEQUATE TO SAFELY COMPLETE DAILY ACTIVITIES?: YES
LACK_OF_TRANSPORTATION: NO
JUDGMENT_ADEQUATE_SAFELY_COMPLETE_DAILY_ACTIVITIES: YES
DRESSING YOURSELF: NEEDS ASSISTANCE
HEARING - RIGHT EAR: HEARING AID
FEEDING YOURSELF: INDEPENDENT

## 2024-05-12 ASSESSMENT — ENCOUNTER SYMPTOMS
HEMATOLOGIC/LYMPHATIC NEGATIVE: 1
CARDIOVASCULAR NEGATIVE: 1
EYES NEGATIVE: 1
LIGHT-HEADEDNESS: 1
NAUSEA: 1
ENDOCRINE NEGATIVE: 1
PSYCHIATRIC NEGATIVE: 1
VOMITING: 1
ALLERGIC/IMMUNOLOGIC NEGATIVE: 1
MUSCULOSKELETAL NEGATIVE: 1
RESPIRATORY NEGATIVE: 1
CONSTITUTIONAL NEGATIVE: 1

## 2024-05-12 ASSESSMENT — COLUMBIA-SUICIDE SEVERITY RATING SCALE - C-SSRS
6. HAVE YOU EVER DONE ANYTHING, STARTED TO DO ANYTHING, OR PREPARED TO DO ANYTHING TO END YOUR LIFE?: NO
1. IN THE PAST MONTH, HAVE YOU WISHED YOU WERE DEAD OR WISHED YOU COULD GO TO SLEEP AND NOT WAKE UP?: NO
2. HAVE YOU ACTUALLY HAD ANY THOUGHTS OF KILLING YOURSELF?: NO

## 2024-05-12 ASSESSMENT — PATIENT HEALTH QUESTIONNAIRE - PHQ9
1. LITTLE INTEREST OR PLEASURE IN DOING THINGS: NOT AT ALL
2. FEELING DOWN, DEPRESSED OR HOPELESS: NOT AT ALL
SUM OF ALL RESPONSES TO PHQ9 QUESTIONS 1 & 2: 0

## 2024-05-12 ASSESSMENT — PAIN - FUNCTIONAL ASSESSMENT: PAIN_FUNCTIONAL_ASSESSMENT: 0-10

## 2024-05-12 ASSESSMENT — PAIN SCALES - GENERAL
PAINLEVEL_OUTOF10: 0 - NO PAIN
PAINLEVEL_OUTOF10: 0 - NO PAIN

## 2024-05-12 NOTE — ED PROVIDER NOTES
HPI   Chief Complaint   Patient presents with    Vomiting     Pt c/o nausea vomiting x 2hrs and near syncopal episode.  Pt thinks she ate bad soup       Patient presents with vomiting.  She states she had chest pain last about 1/2-hour.  She vomited twice.  History of coronary artery disease.  She denies any abdominal pain.  No shortness of breath.  No other associated symptoms.                          [place] Coma Scale Score: 15                     Patient History   Past Medical History:   Diagnosis Date    Personal history of other diseases of the circulatory system     History of coronary artery disease    Personal history of other diseases of the circulatory system     History of cardiac disorder    Personal history of other diseases of the circulatory system 12/06/2018    History of coronary artery disease    Personal history of other endocrine, nutritional and metabolic disease     History of hyperlipidemia    Personal history of other specified conditions     History of palpitations    Personal history of transient ischemic attack (TIA), and cerebral infarction without residual deficits     History of transient cerebral ischemia     Past Surgical History:   Procedure Laterality Date    CORONARY ARTERY BYPASS GRAFT  04/04/2018    CABG    OTHER SURGICAL HISTORY  04/27/2018    Surgery    OTHER SURGICAL HISTORY  12/03/2020    Hysterectomy    OTHER SURGICAL HISTORY  12/03/2020    Lumpectomy     Family History   Problem Relation Name Age of Onset    Heart failure Mother      Cancer Father       Social History     Tobacco Use    Smoking status: Former     Types: Cigarettes     Passive exposure: Past    Smokeless tobacco: Never   Vaping Use    Vaping status: Never Used   Substance Use Topics    Alcohol use: Not Currently    Drug use: Never       Physical Exam   ED Triage Vitals [05/12/24 0249]   Temperature Heart Rate Respirations BP   36.2 °C (97.2 °F) 65 18 179/80      Pulse Ox Temp Source Heart Rate Source  Patient Position   100 % Temporal Monitor --      BP Location FiO2 (%)     Left arm --       Physical Exam  Vitals and nursing note reviewed.   Constitutional:       General: She is not in acute distress.     Appearance: She is well-developed.   HENT:      Head: Normocephalic and atraumatic.   Eyes:      Conjunctiva/sclera: Conjunctivae normal.   Cardiovascular:      Rate and Rhythm: Normal rate and regular rhythm.      Heart sounds: No murmur heard.  Pulmonary:      Effort: Pulmonary effort is normal. No respiratory distress.      Breath sounds: Normal breath sounds.   Abdominal:      Palpations: Abdomen is soft.      Tenderness: There is no abdominal tenderness.   Musculoskeletal:         General: No swelling.      Cervical back: Neck supple.   Skin:     General: Skin is warm and dry.      Capillary Refill: Capillary refill takes less than 2 seconds.   Neurological:      Mental Status: She is alert.   Psychiatric:         Mood and Affect: Mood normal.         ED Course & MDM   Diagnoses as of 05/12/24 0217   Vomiting, unspecified vomiting type, unspecified whether nausea present   Syncope, unspecified syncope type       Medical Decision Making  Twelve-lead EKG normal sinus rhythm with a rate of 63.  No ischemia or injury pattern.  Is interpreted by emergency physician.    Patient was given Zofran 4 mg IV.  She tolerated ice chips.  She did have some mild nausea but wished to go home.  She was given for Zofran ODT by mouth.  Patient's CBC and CMP are unremarkable.  Lipase negative.  Troponins are negative.  Patient be given a prescription for Zofran 4 mg every 6 hours as needed for nausea.  Prior medical records were reviewed.  Repeat abdominal exam remains nontender.  She has no abdominal pain.  This likely gastritis.  Past medical history is reviewed.        Procedure  Procedures     Gabriel Hodgson MD  05/12/24 8980       Gabriel Hodgson MD  05/12/24 7669

## 2024-05-12 NOTE — PROGRESS NOTES
Transition of care note:  Patient was turned over to me from prior provider.  Patient initially was can be discharged home.  She had nausea and vomiting blood work was unremarkable.  We did talk to family to come  the patient but they stated they were unable to do so.  They were concerned about the patient coming home since she had a syncopal episode at home.  I did talk to Dr. Bolanos who is covering for Dr. Brito patient will be admitted to observation.    Diagnoses as of 05/12/24 0841   Vomiting, unspecified vomiting type, unspecified whether nausea present   Syncope, unspecified syncope type        Labs Reviewed   CBC WITH AUTO DIFFERENTIAL - Abnormal       Result Value    WBC 8.7      nRBC 0.0      RBC 4.23      Hemoglobin 13.3      Hematocrit 39.1      MCV 92      MCH 31.4      MCHC 34.0      RDW 13.4      Platelets 171      Neutrophils % 78.5      Immature Granulocytes %, Automated 0.5      Lymphocytes % 16.3      Monocytes % 4.1      Eosinophils % 0.3      Basophils % 0.3      Neutrophils Absolute 6.82 (*)     Immature Granulocytes Absolute, Automated 0.04      Lymphocytes Absolute 1.42      Monocytes Absolute 0.36      Eosinophils Absolute 0.03      Basophils Absolute 0.03     COMPREHENSIVE METABOLIC PANEL - Abnormal    Glucose 169 (*)     Sodium 136      Potassium 3.7      Chloride 103      Bicarbonate 25      Anion Gap 12      Urea Nitrogen 29 (*)     Creatinine 0.82      eGFR 70      Calcium 10.0      Albumin 4.2      Alkaline Phosphatase 69      Total Protein 6.4      AST 27      Bilirubin, Total 0.6      ALT 23     LIPASE - Normal    Lipase 35      Narrative:     Venipuncture immediately after or during the administration of Metamizole may lead to falsely low results. Testing should be performed immediately prior to Metamizole dosing.   SERIAL TROPONIN-INITIAL - Normal    Troponin I, High Sensitivity 7      Narrative:     Less than 99th percentile of normal range cutoff-  Female and children  under 18 years old <14 ng/L; Male <21 ng/L: Negative  Repeat testing should be performed if clinically indicated.     Female and children under 18 years old 14-50 ng/L; Male 21-50 ng/L:  Consistent with possible cardiac damage and possible increased clinical   risk. Serial measurements may help to assess extent of myocardial damage.     >50 ng/L: Consistent with cardiac damage, increased clinical risk and  myocardial infarction. Serial measurements may help assess extent of   myocardial damage.      NOTE: Children less than 1 year old may have higher baseline troponin   levels and results should be interpreted in conjunction with the overall   clinical context.     NOTE: Troponin I testing is performed using a different   testing methodology at [facility] than at other   [facility]. Direct result comparisons should only   be made within the same method.   TROPONIN I, HIGH SENSITIVITY - Normal    Troponin I, High Sensitivity 7      Narrative:     Less than 99th percentile of normal range cutoff-  Female and children under 18 years old <14 ng/L; Male <21 ng/L: Negative  Repeat testing should be performed if clinically indicated.     Female and children under 18 years old 14-50 ng/L; Male 21-50 ng/L:  Consistent with possible cardiac damage and possible increased clinical   risk. Serial measurements may help to assess extent of myocardial damage.     >50 ng/L: Consistent with cardiac damage, increased clinical risk and  myocardial infarction. Serial measurements may help assess extent of   myocardial damage.      NOTE: Children less than 1 year old may have higher baseline troponin   levels and results should be interpreted in conjunction with the overall   clinical context.     NOTE: Troponin I testing is performed using a different   testing methodology at [facility] than at other   [facility]. Direct result comparisons should only   be made within the same method.   TROPONIN  "SERIES- (INITIAL, 1 HR)    Narrative:     The following orders were created for panel order Troponin Series, (0, 1 HR).  Procedure                               Abnormality         Status                     ---------                               -----------         ------                     Troponin I, High Sensiti...[[id number]]  Normal              Final result                 Please view results for these tests on the individual orders.       XR chest 1 view   Final Result   No airspace consolidation or pleural effusion.        MACRO:   None        Signed by: Julio Butler 5/12/2024 3:47 AM   Dictation workstation:   [id number]          Visit Vitals  /67   Pulse 60   Temp 36.2 °C (97.2 °F) (Temporal)   Resp 18   Ht 1.626 m (5' 4\")   Wt 47.6 kg (105 lb)   SpO2 95%   BMI 18.02 kg/m²   Smoking Status Former   BSA 1.47 m²      1. Vomiting, unspecified vomiting type, unspecified whether nausea present    2. Syncope, unspecified syncope type        "

## 2024-05-12 NOTE — H&P
History Of Present Illness  Kimberly Randle is a 83 y.o. female who presents to the emergency department from home with complaints of vomiting and near syncope. She states she thinks she ate some soup that was spoiled yesterday.  She reports the soup it been in her refrigerator for about a week and a half.  She states she had 2 episodes of vomiting last night and felt very weak and lightheaded.  She called her daughter and asked her to take her to the hospital.  She denies any chest pain or shortness of breath.  The patient states she did not pass out however she felt like she was going to.  She denies any dizziness at present.  She does complain of some nausea and weakness currently.  She is alert and oriented times person, place and event.  She was confused on the month and year.  She has a past medical history of coronary artery disease, hypertension, hyperlipidemia and TIA.    ED Course  VS reviewed  Labs reviewed, results below,   EKG interpreted by the ED provider as normal sinus rhythm with a rate of 63. O ischemia or injury pattern  CXR reviewed, results below  Zofran     Past Medical History  CAD, hypertension, hyperlipidemia, TIA    Surgical History  Past Surgical History:   Procedure Laterality Date    CORONARY ARTERY BYPASS GRAFT  04/04/2018    CABG    OTHER SURGICAL HISTORY  04/27/2018    Surgery    OTHER SURGICAL HISTORY  12/03/2020    Hysterectomy    OTHER SURGICAL HISTORY  12/03/2020    Lumpectomy        Social History  She reports that she has quit smoking. Her smoking use included cigarettes. She has been exposed to tobacco smoke. She has never used smokeless tobacco. She reports that she does not currently use alcohol. She reports that she does not use drugs. She lives at home alone but her daughter lives close. She does not use any ambulatory aids.    Family History  Family History   Problem Relation Name Age of Onset    Heart failure Mother      Cancer Father          Allergies  Atorvastatin,  "Amoxicillin, and Codeine    Review of Systems   Constitutional: Negative.    HENT: Negative.     Eyes: Negative.    Respiratory: Negative.     Cardiovascular: Negative.    Gastrointestinal:  Positive for nausea and vomiting.   Endocrine: Negative.    Genitourinary: Negative.    Musculoskeletal: Negative.    Skin: Negative.    Allergic/Immunologic: Negative.    Neurological:  Positive for light-headedness.        Near syncope   Hematological: Negative.    Psychiatric/Behavioral: Negative.          Physical Exam  Constitutional:       General: She is not in acute distress.     Appearance: Normal appearance.   HENT:      Head: Normocephalic.      Nose: Nose normal.      Mouth/Throat:      Mouth: Mucous membranes are moist.      Pharynx: Oropharynx is clear.   Eyes:      Extraocular Movements: Extraocular movements intact.      Pupils: Pupils are equal, round, and reactive to light.   Cardiovascular:      Rate and Rhythm: Normal rate and regular rhythm.   Pulmonary:      Effort: Pulmonary effort is normal.      Breath sounds: Normal breath sounds.   Abdominal:      General: Bowel sounds are normal.      Palpations: Abdomen is soft.      Tenderness: There is no abdominal tenderness.   Musculoskeletal:         General: Normal range of motion.      Cervical back: Normal range of motion.      Right lower leg: No edema.      Left lower leg: No edema.   Skin:     General: Skin is warm and dry.      Capillary Refill: Capillary refill takes less than 2 seconds.   Neurological:      General: No focal deficit present.      Mental Status: She is alert and oriented to person, place, and time.      Cranial Nerves: No cranial nerve deficit.      Sensory: No sensory deficit.   Psychiatric:         Mood and Affect: Mood normal.         Behavior: Behavior normal.          Last Recorded Vitals  Blood pressure 147/67, pulse 60, temperature 36.2 °C (97.2 °F), temperature source Temporal, resp. rate 18, height 1.626 m (5' 4\"), weight 47.6 " kg (105 lb), SpO2 95%.    Relevant Results  Results for orders placed or performed during the hospital encounter of 05/12/24 (from the past 24 hour(s))   CBC with Differential   Result Value Ref Range    WBC 8.7 4.4 - 11.3 x10*3/uL    nRBC 0.0 0.0 - 0.0 /100 WBCs    RBC 4.23 4.00 - 5.20 x10*6/uL    Hemoglobin 13.3 12.0 - 16.0 g/dL    Hematocrit 39.1 36.0 - 46.0 %    MCV 92 80 - 100 fL    MCH 31.4 26.0 - 34.0 pg    MCHC 34.0 32.0 - 36.0 g/dL    RDW 13.4 11.5 - 14.5 %    Platelets 171 150 - 450 x10*3/uL    Neutrophils % 78.5 40.0 - 80.0 %    Immature Granulocytes %, Automated 0.5 0.0 - 0.9 %    Lymphocytes % 16.3 13.0 - 44.0 %    Monocytes % 4.1 2.0 - 10.0 %    Eosinophils % 0.3 0.0 - 6.0 %    Basophils % 0.3 0.0 - 2.0 %    Neutrophils Absolute 6.82 (H) 1.60 - 5.50 x10*3/uL    Immature Granulocytes Absolute, Automated 0.04 0.00 - 0.50 x10*3/uL    Lymphocytes Absolute 1.42 0.80 - 3.00 x10*3/uL    Monocytes Absolute 0.36 0.05 - 0.80 x10*3/uL    Eosinophils Absolute 0.03 0.00 - 0.40 x10*3/uL    Basophils Absolute 0.03 0.00 - 0.10 x10*3/uL   Comprehensive Metabolic Panel   Result Value Ref Range    Glucose 169 (H) 74 - 99 mg/dL    Sodium 136 136 - 145 mmol/L    Potassium 3.7 3.5 - 5.3 mmol/L    Chloride 103 98 - 107 mmol/L    Bicarbonate 25 21 - 32 mmol/L    Anion Gap 12 10 - 20 mmol/L    Urea Nitrogen 29 (H) 6 - 23 mg/dL    Creatinine 0.82 0.50 - 1.05 mg/dL    eGFR 70 >60 mL/min/1.73m*2    Calcium 10.0 8.6 - 10.3 mg/dL    Albumin 4.2 3.4 - 5.0 g/dL    Alkaline Phosphatase 69 33 - 136 U/L    Total Protein 6.4 6.4 - 8.2 g/dL    AST 27 9 - 39 U/L    Bilirubin, Total 0.6 0.0 - 1.2 mg/dL    ALT 23 7 - 45 U/L   Lipase   Result Value Ref Range    Lipase 35 9 - 82 U/L   Troponin I, High Sensitivity, Initial   Result Value Ref Range    Troponin I, High Sensitivity 7 0 - 13 ng/L   Troponin I, High Sensitivity   Result Value Ref Range    Troponin I, High Sensitivity 7 0 - 13 ng/L   XR chest 1 view    Result Date:  5/12/2024  Interpreted By:  Julio Butler, STUDY: XR CHEST 1 VIEW;  5/12/2024 3:39 am   INDICATION: Signs/Symptoms:chest pain.   COMPARISON: 1/6/2019   ACCESSION NUMBER(S): [id number]   ORDERING CLINICIAN: MADELINE HAMM   FINDINGS: Postsurgical change with sternotomy and mediastinal clips. The cardiac silhouette is normal in size. No focal airspace consolidation or pleural effusion. No pneumothorax.       No airspace consolidation or pleural effusion.   MACRO: None   Signed by: Julio Butler 5/12/2024 3:47 AM Dictation workstation:   [id number]      Scheduled medications  [START ON 5/13/2024] pantoprazole, 40 mg, oral, Daily before breakfast   Or  [START ON 5/13/2024] pantoprazole, 40 mg, intravenous, Daily before breakfast  perflutren lipid microspheres, 0.5-10 mL of dilution, intravenous, Once in imaging  perflutren protein A microsphere, 0.5 mL, intravenous, Once in imaging  sodium chloride, 500 mL, intravenous, Once  sulfur hexafluoride microsphr, 2 mL, intravenous, Once in imaging      Continuous medications     PRN medications  PRN medications: acetaminophen, ondansetron **OR** ondansetron      Assessment/Plan   Principal Problem:    Vomiting, unspecified vomiting type, unspecified whether nausea present  Near syncope  Admit to general medicine under Dr. Bolanos  Acute, observation, telemetry  Defer consults to attending  Check Orthostatics  Head CT  Carotid US  Echo (last echo 5/22/2023, EF 60-65%)  Clear liquid diet, may advance diet as tolerated  Zofran  IVF NS @50ml/hr for 500mL  CBC and CMP in AM  PT/OT    Chronic conditions  CAD, hypertension, hyperlipidemia, TIA  Continue home medications as appropriate when home med rec is complete    DVT Prophylaxis  Heparin  SCDs         I spent 45 minutes in the professional and overall care of this patient.  Patient fully evaluated and plan as above    Edilma Diaz, APRN-CNP

## 2024-05-13 ENCOUNTER — APPOINTMENT (OUTPATIENT)
Dept: VASCULAR MEDICINE | Facility: HOSPITAL | Age: 84
End: 2024-05-13
Payer: MEDICARE

## 2024-05-13 LAB
ALBUMIN SERPL BCP-MCNC: 3.7 G/DL (ref 3.4–5)
ALP SERPL-CCNC: 65 U/L (ref 33–136)
ALT SERPL W P-5'-P-CCNC: 21 U/L (ref 7–45)
ANION GAP SERPL CALC-SCNC: 9 MMOL/L (ref 10–20)
AST SERPL W P-5'-P-CCNC: 28 U/L (ref 9–39)
BILIRUB SERPL-MCNC: 0.7 MG/DL (ref 0–1.2)
BUN SERPL-MCNC: 17 MG/DL (ref 6–23)
CALCIUM SERPL-MCNC: 9.5 MG/DL (ref 8.6–10.3)
CHLORIDE SERPL-SCNC: 106 MMOL/L (ref 98–107)
CO2 SERPL-SCNC: 30 MMOL/L (ref 21–32)
CREAT SERPL-MCNC: 1.07 MG/DL (ref 0.5–1.05)
EGFRCR SERPLBLD CKD-EPI 2021: 51 ML/MIN/1.73M*2
ERYTHROCYTE [DISTWIDTH] IN BLOOD BY AUTOMATED COUNT: 13.7 % (ref 11.5–14.5)
GLUCOSE SERPL-MCNC: 86 MG/DL (ref 74–99)
HCT VFR BLD AUTO: 41.9 % (ref 36–46)
HGB BLD-MCNC: 13.7 G/DL (ref 12–16)
MCH RBC QN AUTO: 31.1 PG (ref 26–34)
MCHC RBC AUTO-ENTMCNC: 32.7 G/DL (ref 32–36)
MCV RBC AUTO: 95 FL (ref 80–100)
NRBC BLD-RTO: 0 /100 WBCS (ref 0–0)
PLATELET # BLD AUTO: 162 X10*3/UL (ref 150–450)
POTASSIUM SERPL-SCNC: 4.3 MMOL/L (ref 3.5–5.3)
PROT SERPL-MCNC: 5.8 G/DL (ref 6.4–8.2)
RBC # BLD AUTO: 4.41 X10*6/UL (ref 4–5.2)
SODIUM SERPL-SCNC: 141 MMOL/L (ref 136–145)
WBC # BLD AUTO: 7 X10*3/UL (ref 4.4–11.3)

## 2024-05-13 PROCEDURE — 85027 COMPLETE CBC AUTOMATED: CPT | Performed by: NURSE PRACTITIONER

## 2024-05-13 PROCEDURE — 36415 COLL VENOUS BLD VENIPUNCTURE: CPT | Performed by: NURSE PRACTITIONER

## 2024-05-13 PROCEDURE — 93880 EXTRACRANIAL BILAT STUDY: CPT | Performed by: INTERNAL MEDICINE

## 2024-05-13 PROCEDURE — 80053 COMPREHEN METABOLIC PANEL: CPT | Performed by: INTERNAL MEDICINE

## 2024-05-13 PROCEDURE — 2500000001 HC RX 250 WO HCPCS SELF ADMINISTERED DRUGS (ALT 637 FOR MEDICARE OP): Performed by: NURSE PRACTITIONER

## 2024-05-13 PROCEDURE — 97165 OT EVAL LOW COMPLEX 30 MIN: CPT | Mod: GO

## 2024-05-13 PROCEDURE — 97161 PT EVAL LOW COMPLEX 20 MIN: CPT | Mod: GP

## 2024-05-13 PROCEDURE — 93880 EXTRACRANIAL BILAT STUDY: CPT

## 2024-05-13 PROCEDURE — 2500000001 HC RX 250 WO HCPCS SELF ADMINISTERED DRUGS (ALT 637 FOR MEDICARE OP)

## 2024-05-13 PROCEDURE — G0378 HOSPITAL OBSERVATION PER HR: HCPCS

## 2024-05-13 PROCEDURE — 70450 CT HEAD/BRAIN W/O DYE: CPT | Performed by: STUDENT IN AN ORGANIZED HEALTH CARE EDUCATION/TRAINING PROGRAM

## 2024-05-13 RX ORDER — PANTOPRAZOLE SODIUM 40 MG/1
40 TABLET, DELAYED RELEASE ORAL
Qty: 30 TABLET | Refills: 0 | Status: SHIPPED | OUTPATIENT
Start: 2024-05-14 | End: 2024-06-13

## 2024-05-13 RX ORDER — ACETAMINOPHEN 325 MG/1
650 TABLET ORAL EVERY 4 HOURS PRN
Start: 2024-05-13

## 2024-05-13 RX ADMIN — Medication 1 TABLET: at 08:26

## 2024-05-13 RX ADMIN — PANTOPRAZOLE SODIUM 40 MG: 40 TABLET, DELAYED RELEASE ORAL at 06:26

## 2024-05-13 RX ADMIN — DONEPEZIL HYDROCHLORIDE 5 MG: 5 TABLET, FILM COATED ORAL at 20:17

## 2024-05-13 RX ADMIN — ASPIRIN 81 MG: 81 TABLET, COATED ORAL at 08:26

## 2024-05-13 SDOH — ECONOMIC STABILITY: GENERAL

## 2024-05-13 SDOH — ECONOMIC STABILITY: HOUSING INSECURITY: IN THE LAST 12 MONTHS, HOW MANY PLACES HAVE YOU LIVED?: 1

## 2024-05-13 SDOH — ECONOMIC STABILITY: FOOD INSECURITY

## 2024-05-13 SDOH — ECONOMIC STABILITY: TRANSPORTATION INSECURITY

## 2024-05-13 SDOH — ECONOMIC STABILITY: INCOME INSECURITY: IN THE LAST 12 MONTHS, WAS THERE A TIME WHEN YOU WERE NOT ABLE TO PAY THE MORTGAGE OR RENT ON TIME?: NO

## 2024-05-13 SDOH — ECONOMIC STABILITY: FOOD INSECURITY: WITHIN THE PAST 12 MONTHS, YOU WORRIED THAT YOUR FOOD WOULD RUN OUT BEFORE YOU GOT MONEY TO BUY MORE.: NEVER TRUE

## 2024-05-13 SDOH — ECONOMIC STABILITY: FOOD INSECURITY: WITHIN THE PAST 12 MONTHS, THE FOOD YOU BOUGHT JUST DIDN'T LAST AND YOU DIDN'T HAVE MONEY TO GET MORE.: NEVER TRUE

## 2024-05-13 SDOH — ECONOMIC STABILITY: HOUSING INSECURITY: IN THE PAST 12 MONTHS HAS THE ELECTRIC, GAS, OIL, OR WATER COMPANY THREATENED TO SHUT OFF SERVICES IN YOUR HOME?: NO

## 2024-05-13 SDOH — ECONOMIC STABILITY: HOUSING INSECURITY
IN THE LAST 12 MONTHS, WAS THERE A TIME WHEN YOU DID NOT HAVE A STEADY PLACE TO SLEEP OR SLEPT IN A SHELTER (INCLUDING NOW)?: NO

## 2024-05-13 SDOH — ECONOMIC STABILITY: FOOD INSECURITY: WITHIN THE PAST 12 MONTHS, YOU WORRIED THAT YOUR FOOD WOULD RUN OUT BEFORE YOU GOT THE MONEY TO BUY MORE.: NEVER TRUE

## 2024-05-13 SDOH — ECONOMIC STABILITY: TRANSPORTATION INSECURITY
IN THE PAST 12 MONTHS, HAS LACK OF TRANSPORTATION KEPT YOU FROM MEETINGS, WORK, OR FROM GETTING THINGS NEEDED FOR DAILY LIVING?: NO

## 2024-05-13 SDOH — ECONOMIC STABILITY: FOOD INSECURITY: WITHIN THE PAST 12 MONTHS, THE FOOD YOU BOUGHT JUST DIDN’T LAST AND YOU DIDN’T HAVE MONEY TO GET MORE.: NEVER TRUE

## 2024-05-13 SDOH — ECONOMIC STABILITY: TRANSPORTATION INSECURITY: IN THE PAST 12 MONTHS, HAS LACK OF TRANSPORTATION KEPT YOU FROM MEDICAL APPOINTMENTS OR FROM GETTING MEDICATIONS?: NO

## 2024-05-13 SDOH — ECONOMIC STABILITY: HOUSING INSECURITY: IN THE LAST 12 MONTHS, WAS THERE A TIME WHEN YOU WERE NOT ABLE TO PAY THE MORTGAGE OR RENT ON TIME?: NO

## 2024-05-13 SDOH — ECONOMIC STABILITY: TRANSPORTATION INSECURITY
IN THE PAST 12 MONTHS, HAS THE LACK OF TRANSPORTATION KEPT YOU FROM MEDICAL APPOINTMENTS OR FROM GETTING MEDICATIONS?: NO

## 2024-05-13 SDOH — ECONOMIC STABILITY: HOUSING INSECURITY

## 2024-05-13 ASSESSMENT — COGNITIVE AND FUNCTIONAL STATUS - GENERAL
DAILY ACTIVITIY SCORE: 24
MOBILITY SCORE: 24
DAILY ACTIVITIY SCORE: 24
MOBILITY SCORE: 24

## 2024-05-13 ASSESSMENT — PAIN SCALES - GENERAL
PAINLEVEL_OUTOF10: 0 - NO PAIN

## 2024-05-13 ASSESSMENT — ACTIVITIES OF DAILY LIVING (ADL): LACK_OF_TRANSPORTATION: NO

## 2024-05-13 ASSESSMENT — SOCIAL DETERMINANTS OF HEALTH (SDOH): IN THE PAST 12 MONTHS, HAS THE ELECTRIC, GAS, OIL, OR WATER COMPANY THREATENED TO SHUT OFF SERVICE IN YOUR HOME?: NO

## 2024-05-13 ASSESSMENT — PAIN - FUNCTIONAL ASSESSMENT: PAIN_FUNCTIONAL_ASSESSMENT: 0-10

## 2024-05-13 NOTE — PROGRESS NOTES
Occupational Therapy    Evaluation    Patient Name: Kimberly Randle  MRN: [id number]  Today's Date: 5/13/2024  Time Calculation  Start Time: 1030  Stop Time: 1055  Time Calculation (min): 25 min    Assessment  IP OT Assessment  OT Assessment: Pt is an 83 year old female to ER due to vomiting and near symcope due to eating spoiled soup. Pt is indep wtih bed/sit to stand and functional mobility without a device. No OT skilled needs were identified  Plan:  Treatment Interventions:  (OT eval only)  No Skilled OT: No acute OT goals identified  OT Frequency:  (No OT needs were identified)  OT - OK to Discharge:  (Okay to discharge thao next level of care pending medical team approval)    Subjective   Current Problem:  1. Syncope, unspecified syncope type  Carotid duplex bilateral    Transthoracic Echo (TTE) Limited    Carotid duplex bilateral    Transthoracic Echo (TTE) Limited      2. Vomiting, unspecified vomiting type, unspecified whether nausea present  ondansetron ODT (Zofran-ODT) 4 mg disintegrating tablet        General:  General  Reason for Referral: OT eval/tx/impaired functional daily living skills  Referred By: Dr Bolanos  Past Medical History Relevant to Rehab: Dx: vomiting and near syncope after eating spoiled soup (coronary artery disease, hypertension, hyperlipidemia and TIA.)  Missed Visit Reason: Patient refused  Prior to Session Communication: Bedside nurse  General Comment: Pt states that she will be residing with her dtr for 1 week after hospital discharge  Precautions:   fall       Pain:   0/10 pain    Objective   Cognition:  Overall Cognitive Status: Within Functional Limits           Home Living:  Home Living Comments: Pt's dtr's home has 2 entry steps with a rail ( both garage and front entrances). Pt has a tub/shower combination with a shower chair available and a standard bed and a standard toliet. Pt  no longer drives, [facility]  drives pt to appointments   Prior Function:  Hand Dominance:  Right  IADL History:     ADL:wfl     Activity Tolerance: wfl     Bed Mobility/Transfers: Bed Mobility  Bed Mobility:  (indep)    Transfers  Transfer:  (indep)      Functional Mobility:  Functional Mobility  Functional Mobility Performed:  (indep without device)  Modalities:     IADL's:      Vision: Vision - Basic Assessment  Current Vision: No visual deficits  Sensation:wfl     Strength:strength below elbow wfl     Perception:wfl     Coordination:wfl      Hand Function:  Hand Function  Gross Grasp:  (wfl)  Extremities: RUE   RUE :  (wfl) and LUE   LUE:  (wfl)    Outcome Measures: [facility] Daily Activity  Putting on and taking off regular lower body clothing: None  Bathing (including washing, rinsing, drying): None  Putting on and taking off regular upper body clothing: None  Toileting, which includes using toilet, bedpan or urinal: None  Taking care of personal grooming such as brushing teeth: None  Eating Meals: None  Daily Activity - Total Score: 24      Education Documentation  No documentation found.  Education Comments  No comments found.    Pt encouraged to have initial supervision with bathroom activities upon discharge. Pt demonstrated good comprehension of instructions, but tends to be highly indep and does not wait for assistance  Goals:   No skilled OT needs identified

## 2024-05-13 NOTE — PROGRESS NOTES
05/13/24 1630   Discharge Planning   Living Arrangements Alone   Support Systems Children;Family members   Assistance Needed independent at baseline   Type of Residence Private residence   Number of Stairs to Enter Residence 4   Number of Stairs Within Residence 10   Do you have animals or pets at home? No   Patient expects to be discharged to: respite vs assisted living   Does the patient need discharge transport arranged? No     I observed patient working with PT/OT.  I received a call from patient's daughter oDnna informing me that patient has dementia and will tell me everything is fine and she is ready to go home, however patient has had several recent falls, has left the gas on on her stove, has been found wandering the streets in the rain.  While physically she is fine, Donna reported patient is not safe to return home alone.  She reported she lives in [place] and has a niece and sister who live 3 blocks from patient and they check on her often but cannot stay with her around the clock.  Donna wants patient to go to a facility for a respite stay until they can secure an assisted living, possibly one with memory care.  Donna stated they are willing to private pay, asked for referral to be sent to [facility].  I did send the referral and let Donna know that they will more than likely accept her but will want money upfront.  I also informed Donna that patient has a discharge order.  Donna stated she or her niece will go to [place] tomorrow, give them money then pick patient up and transport her.  Care Coordination team following for assistance with discharge planning.  Felicia FERMIN TCC

## 2024-05-13 NOTE — CONSULTS
"Nutrition Initial Assessment:   Nutrition Assessment    Reason for Assessment: Admission nursing screening (MST=2)    Patient is a 83 y.o. female presenting with N/V.  PMHx: CAD, HTN, HLD, TIA      Nutrition History:  Energy Intake:  (UTD)  Food and Nutrient History: Patient was unavailable at attempted visit. Per note PTA pt ate spoiled soup and has been dealing with N/V.  Vitamin/Herbal Supplement Use: Coenzyme Q10, MVI, Potassium/magnesium  Food Allergies/Intolerances:  None  GI Symptoms: Nausea and Vomiting  Oral Problems:  LUIS F       Anthropometrics:  Height: 162.6 cm (5' 4\")   Weight: 47.6 kg (105 lb)   BMI (Calculated): 18.01  IBW/kg (Dietitian Calculated): 54.5 kg          Weight History:   4/25-47.1kg  3/14-46.6kg  1/23-42.6kg  11/6/23-44.1kg  10/24/23-43.5kg  9/9/23-43.1kg  6/30/23-44kg  5/22/23-44kg  Pt has gained weight, no edema present.   Weight Change %:  Significant Weight Loss: No    Nutrition Focused Physical Exam Findings:  defer: pt unavailable    Edema:  Edema: none  Edema Location: Per nursing assessment  Physical Findings:  Skin: Negative    Nutrition Significant Labs:  BG POCT trend:    , Renal Lab Trend:   Results from last 7 days   Lab Units 05/13/24  0728 05/12/24  1430 05/12/24  0316   POTASSIUM mmol/L 4.3  --  3.7   SODIUM mmol/L 141  --  136   MAGNESIUM mg/dL  --  1.71  --    EGFR mL/min/1.73m*2 51*  --  70   BUN mg/dL 17  --  29*   CREATININE mg/dL 1.07*  --  0.82        Nutrition Specific Medications:  Reviewed.     I/O:   Last BM Date: 05/12/24;      Dietary Orders (From admission, onward)       Start     Ordered    05/12/24 1820  Adult diet Regular  Diet effective now        Comments: May advance diet as tolerated   Question:  Diet type  Answer:  Regular    05/12/24 1820                     Estimated Needs:      Method for Estimating Needs: 1428-1570kcals (30-33kcals/kg ABW)     Method for Estimating Needs: 57-65g (1.2-1.4g/kg ABW)     Method for Estimating Needs: 1ml/kcal or per " MD        Nutrition Diagnosis   Malnutrition Diagnosis  Patient has Malnutrition Diagnosis:  (likely but pt unavailable to assess)    Nutrition Diagnosis  Patient has Nutrition Diagnosis: Yes  Diagnosis Status (1): New  Nutrition Diagnosis 1: Predicted inadequate energy intake  Related to (1): acute illness  As Evidenced by (1): N/V PTA, consuming spoiled soup, low BMI of 18.02       Nutrition Interventions/Recommendations         Nutrition Prescription:  Individualized Nutrition Prescription Provided for : Continue regular diet with ONS for weight gain.        Nutrition Interventions:   Interventions: Meals and snacks, Medical food supplement  Meals and Snacks: General healthful diet  Goal: 3 meals per day  Medical Food Supplement: Commercial beverage  Goal: Magic cup BID (for an additional 290 kcals, 9 gm protein each), Ensure plus high protein daily (for an additional 350 kcals, 20 gm protein each)      Nutrition Education:      N/A- regular diet    Nutrition Monitoring and Evaluation   Food/Nutrient Related History Monitoring  Monitoring and Evaluation Plan: Energy intake, Fluid intake, Amount of food  Energy Intake: Estimated energy intake  Criteria: Meal/ONS intake to meet >75% of estimated needs  Fluid Intake: Estimated fluid intake  Criteria: fluid intake to meet >75% of estimated need  Amount of Food: Estimated amout of food, Medical food intake  Criteria: Pt to consume >75% of meals/ONS    Body Composition/Growth/Weight History  Monitoring and Evaluation Plan: Weight  Weight: Measured weight  Criteria: Reweigh at least every 5 days    Biochemical Data, Medical Tests and Procedures  Monitoring and Evaluation Plan: Electrolyte/renal panel, Glucose/endocrine profile  Criteria: Labs to stay WNL    Nutrition Focused Physical Findings  Monitoring and Evaluation Plan: Skin, Digestive System  Criteria: Regular BM every 2-3 days  Criteria: maintenance of skin integrity       Time Spent/Follow-up Reminder:   Time  Spent (min): 45 minutes  Last Date of Nutrition Visit: 05/13/24  Nutrition Follow-Up Needed?: 3-5 days  Follow up Comment: 5/17 CK

## 2024-05-13 NOTE — DISCHARGE SUMMARY
Discharge Diagnosis  Vomiting, unspecified vomiting type, unspecified whether nausea present    Issues Requiring Follow-Up  Patient fully evaluated on May 13. Vitals are stable. Labs show normal CBC with normal BUN, Cr 1.07, and GFR 51. Troponin and lipase were normal. Chest XR performed on 5/12/24 without abnormalities. Echocardiogram performed on 5/12/24 showed normal ejection fraction with mitral and aortic regurgitation. Bilateral carotid duplex performed today showed bilateral carotids with less than 50% stenosis. CT of the head is pending. Plan to discharge today.     Discharge Meds     Your medication list        START taking these medications        Instructions Last Dose Given Next Dose Due   ondansetron ODT 4 mg disintegrating tablet  Commonly known as: Zofran-ODT      Take 1 tablet (4 mg) by mouth every 6 hours if needed for nausea or vomiting for up to 3 days.              ASK your doctor about these medications        Instructions Last Dose Given Next Dose Due   aspirin 81 mg EC tablet           MONIKA-CITRATE ORAL           donepezil 5 mg tablet  Commonly known as: Aricept      Take 1 tablet (5 mg) by mouth once daily at bedtime for 14 days.       estradiol 0.01 % (0.1 mg/gram) vaginal cream  Commonly known as: Estrace      Use a 1/2 gram in the vagina twice weekly at bedtime       FISH OIL ORAL           glucosamine-chondroitin 1,500-1,200 mg/30 mL liquid           losartan 50 mg tablet  Commonly known as: Cozaar           rosuvastatin 40 mg tablet  Commonly known as: Crestor      Take 1 tablet (40 mg) by mouth once daily.                 Where to Get Your Medications        These medications were sent to [id number] [address] [facility]  [address] [facility] [address]      Phone: [phone]   ondansetron ODT 4 mg disintegrating tablet         Test Results Pending At Discharge  Pending Labs       No current pending labs.            Hospital Course         Gabriel Bolanos MD    Physician  Internal Medicine     H&P     Addendum     Date of Service: 5/12/2024  9:21 AM     Addendum       Expand All Collapse All    History Of Present Illness  Kimberly Randle is a 83 y.o. female who presents to the emergency department from home with complaints of vomiting and near syncope. She states she thinks she ate some soup that was spoiled yesterday.  She reports the soup it been in her refrigerator for about a week and a half.  She states she had 2 episodes of vomiting last night and felt very weak and lightheaded.  She called her daughter and asked her to take her to the hospital.  She denies any chest pain or shortness of breath.  The patient states she did not pass out however she felt like she was going to.  She denies any dizziness at present.  She does complain of some nausea and weakness currently.  She is alert and oriented times person, place and event.  She was confused on the month and year.  She has a past medical history of coronary artery disease, hypertension, hyperlipidemia and TIA.     ED Course  VS reviewed  Labs reviewed, results below,   EKG interpreted by the ED provider as normal sinus rhythm with a rate of 63. O ischemia or injury pattern  CXR reviewed, results below  Zofran      Past Medical History  CAD, hypertension, hyperlipidemia, TIA     Surgical History  Surgical History         Past Surgical History:   Procedure Laterality Date    CORONARY ARTERY BYPASS GRAFT   04/04/2018     CABG    OTHER SURGICAL HISTORY   04/27/2018     Surgery    OTHER SURGICAL HISTORY   12/03/2020     Hysterectomy    OTHER SURGICAL HISTORY   12/03/2020     Lumpectomy            Social History  She reports that she has quit smoking. Her smoking use included cigarettes. She has been exposed to tobacco smoke. She has never used smokeless tobacco. She reports that she does not currently use alcohol. She reports that she does not use drugs. She lives at home alone but her daughter lives close. She  does not use any ambulatory aids.     Family History  Family History          Family History   Problem Relation Name Age of Onset    Heart failure Mother        Cancer Father                Allergies  Atorvastatin, Amoxicillin, and Codeine     Review of Systems   Constitutional: Negative.    HENT: Negative.     Eyes: Negative.    Respiratory: Negative.     Cardiovascular: Negative.    Gastrointestinal:  Positive for nausea and vomiting.   Endocrine: Negative.    Genitourinary: Negative.    Musculoskeletal: Negative.    Skin: Negative.    Allergic/Immunologic: Negative.    Neurological:  Positive for light-headedness.        Near syncope   Hematological: Negative.    Psychiatric/Behavioral: Negative.           Physical Exam  Constitutional:       General: She is not in acute distress.     Appearance: Normal appearance.   HENT:      Head: Normocephalic.      Nose: Nose normal.      Mouth/Throat:      Mouth: Mucous membranes are moist.      Pharynx: Oropharynx is clear.   Eyes:      Extraocular Movements: Extraocular movements intact.      Pupils: Pupils are equal, round, and reactive to light.   Cardiovascular:      Rate and Rhythm: Normal rate and regular rhythm.   Pulmonary:      Effort: Pulmonary effort is normal.      Breath sounds: Normal breath sounds.   Abdominal:      General: Bowel sounds are normal.      Palpations: Abdomen is soft.      Tenderness: There is no abdominal tenderness.   Musculoskeletal:         General: Normal range of motion.      Cervical back: Normal range of motion.      Right lower leg: No edema.      Left lower leg: No edema.   Skin:     General: Skin is warm and dry.      Capillary Refill: Capillary refill takes less than 2 seconds.   Neurological:      General: No focal deficit present.      Mental Status: She is alert and oriented to person, place, and time.      Cranial Nerves: No cranial nerve deficit.      Sensory: No sensory deficit.   Psychiatric:         Mood and Affect: Mood  "normal.         Behavior: Behavior normal.            Last Recorded Vitals  Blood pressure 147/67, pulse 60, temperature 36.2 °C (97.2 °F), temperature source Temporal, resp. rate 18, height 1.626 m (5' 4\"), weight 47.6 kg (105 lb), SpO2 95%.     Relevant Results        Results for orders placed or performed during the hospital encounter of 05/12/24 (from the past 24 hour(s))   CBC with Differential   Result Value Ref Range     WBC 8.7 4.4 - 11.3 x10*3/uL     nRBC 0.0 0.0 - 0.0 /100 WBCs     RBC 4.23 4.00 - 5.20 x10*6/uL     Hemoglobin 13.3 12.0 - 16.0 g/dL     Hematocrit 39.1 36.0 - 46.0 %     MCV 92 80 - 100 fL     MCH 31.4 26.0 - 34.0 pg     MCHC 34.0 32.0 - 36.0 g/dL     RDW 13.4 11.5 - 14.5 %     Platelets 171 150 - 450 x10*3/uL     Neutrophils % 78.5 40.0 - 80.0 %     Immature Granulocytes %, Automated 0.5 0.0 - 0.9 %     Lymphocytes % 16.3 13.0 - 44.0 %     Monocytes % 4.1 2.0 - 10.0 %     Eosinophils % 0.3 0.0 - 6.0 %     Basophils % 0.3 0.0 - 2.0 %     Neutrophils Absolute 6.82 (H) 1.60 - 5.50 x10*3/uL     Immature Granulocytes Absolute, Automated 0.04 0.00 - 0.50 x10*3/uL     Lymphocytes Absolute 1.42 0.80 - 3.00 x10*3/uL     Monocytes Absolute 0.36 0.05 - 0.80 x10*3/uL     Eosinophils Absolute 0.03 0.00 - 0.40 x10*3/uL     Basophils Absolute 0.03 0.00 - 0.10 x10*3/uL   Comprehensive Metabolic Panel   Result Value Ref Range     Glucose 169 (H) 74 - 99 mg/dL     Sodium 136 136 - 145 mmol/L     Potassium 3.7 3.5 - 5.3 mmol/L     Chloride 103 98 - 107 mmol/L     Bicarbonate 25 21 - 32 mmol/L     Anion Gap 12 10 - 20 mmol/L     Urea Nitrogen 29 (H) 6 - 23 mg/dL     Creatinine 0.82 0.50 - 1.05 mg/dL     eGFR 70 >60 mL/min/1.73m*2     Calcium 10.0 8.6 - 10.3 mg/dL     Albumin 4.2 3.4 - 5.0 g/dL     Alkaline Phosphatase 69 33 - 136 U/L     Total Protein 6.4 6.4 - 8.2 g/dL     AST 27 9 - 39 U/L     Bilirubin, Total 0.6 0.0 - 1.2 mg/dL     ALT 23 7 - 45 U/L   Lipase   Result Value Ref Range     Lipase 35 9 - 82 U/L "   Troponin I, High Sensitivity, Initial   Result Value Ref Range     Troponin I, High Sensitivity 7 0 - 13 ng/L   Troponin I, High Sensitivity   Result Value Ref Range     Troponin I, High Sensitivity 7 0 - 13 ng/L   XR chest 1 view     Result Date: 5/12/2024  Interpreted By:  Julio Butler, STUDY: XR CHEST 1 VIEW;  5/12/2024 3:39 am   INDICATION: Signs/Symptoms:chest pain.   COMPARISON: 1/6/2019   ACCESSION NUMBER(S): [id number]   ORDERING CLINICIAN: MADELINE HAMM   FINDINGS: Postsurgical change with sternotomy and mediastinal clips. The cardiac silhouette is normal in size. No focal airspace consolidation or pleural effusion. No pneumothorax.        No airspace consolidation or pleural effusion.   MACRO: None   Signed by: Julio Butler 5/12/2024 3:47 AM Dictation workstation:   [id number]       Scheduled medications  [START ON 5/13/2024] pantoprazole, 40 mg, oral, Daily before breakfast   Or  [START ON 5/13/2024] pantoprazole, 40 mg, intravenous, Daily before breakfast  perflutren lipid microspheres, 0.5-10 mL of dilution, intravenous, Once in imaging  perflutren protein A microsphere, 0.5 mL, intravenous, Once in imaging  sodium chloride, 500 mL, intravenous, Once  sulfur hexafluoride microsphr, 2 mL, intravenous, Once in imaging        Continuous medications  PRN medications  PRN medications: acetaminophen, ondansetron **OR** ondansetron         Assessment/Plan   Principal Problem:    Vomiting, unspecified vomiting type, unspecified whether nausea present  Near syncope  Admit to general medicine under Dr. Bolanos  Acute, observation, telemetry  Defer consults to attending  Check Orthostatics  Head CT  Carotid US  Echo (last echo 5/22/2023, EF 60-65%)  Clear liquid diet, may advance diet as tolerated  Zofran  IVF NS @50ml/hr for 500mL  CBC and CMP in AM  PT/OT     Chronic conditions  CAD, hypertension, hyperlipidemia, TIA  Continue home medications as appropriate when home med rec is complete     DVT  Prophylaxis  Heparin  SCDs              I spent 45 minutes in the professional and overall care of this patient.  Patient fully evaluated and plan as above     Edilma Diaz, BLANQUITA-CNP                 Revision History         Pertinent Physical Exam At Time of Discharge  Physical Exam    Outpatient Follow-Up  Future Appointments   Date Time Provider Department Center   9/30/2024 11:30 AM Daniel Love MD [id number] [place]   11/6/2024  1:00 PM Mamadou Barrett MD [id number] [facility]

## 2024-05-13 NOTE — PROGRESS NOTES
05/13/24 1145   Housing Stability   In the last 12 months, was there a time when you were not able to pay the mortgage or rent on time? N     This  met with patient to talk about the SDOH that triggered for issues with paying mortgage or rent. Patient states that it must of been a mistake as her home is paid off. Patient denies any social work needs.  will continue to follow. Message with questions.  DANNIE Gomez

## 2024-05-13 NOTE — PROGRESS NOTES
Occupational Therapy    Evaluation    Patient Name: Kimberly Randle  MRN: [id number]  Today's Date: 5/13/2024  Time Calculation  Start Time: 1030  Stop Time: 1055  Time Calculation (min): 25 min    Assessment  IP OT Assessment  OT Assessment: Pt is an 83 year old female to ER due to vomiting and near symcope due to eating spoiled soup. Pt is indep wtih bed/sit to stand and functional mobility without a device. No OT skilled needs were identified  Plan:  Treatment Interventions:  (OT eval only)  No Skilled OT: No acute OT goals identified  OT Frequency:  (No OT needs were identified)  OT - OK to Discharge:  (Okay to discharge thao next level of care pending medical team approval)    Subjective   Current Problem:  1. Syncope, unspecified syncope type  Carotid duplex bilateral    Transthoracic Echo (TTE) Limited    Carotid duplex bilateral    Transthoracic Echo (TTE) Limited      2. Vomiting, unspecified vomiting type, unspecified whether nausea present  ondansetron ODT (Zofran-ODT) 4 mg disintegrating tablet        General:  General  Reason for Referral: OT eval/tx/impaired functional daily living skills  Referred By: Dr Bolanos  Past Medical History Relevant to Rehab: Dx: vomiting and near syncope after eating spoiled soup (coronary artery disease, hypertension, hyperlipidemia and TIA.)  Missed Visit Reason: Patient refused  Prior to Session Communication: Bedside nurse  General Comment: Pt states that she will be residing with her dtr for 1 week after hospital discharge  Precautions:   fall       Pain:   0/10 pain    Objective   Cognition:  Overall Cognitive Status: Within Functional Limits           Home Living:  Home Living Comments: Pt's dtr's home has 2 entry steps with a rail ( both garage and front entrances). Pt has a tub/shower combination with a shower chair available and a standard bed and a standard toliet. Pt  no longer drives, [facility]  drives pt to appointments   Prior Function:  Hand Dominance:  Right  IADL History:     ADL:     Activity Tolerance:     Bed Mobility/Transfers: Bed Mobility  Bed Mobility:  (indep)    Transfers  Transfer:  (indep)      Functional Mobility:  Functional Mobility  Functional Mobility Performed:  (indep without device)  Modalities:     IADL's:      Vision: Vision - Basic Assessment  Current Vision: No visual deficits  Sensation:wfl     Strength:strength below elbow wfl     Perception:wfl     Coordination:wfl      Hand Function:  Hand Function  Gross Grasp:  (wfl)  Extremities: RUE   RUE :  (wfl) and LUE   LUE:  (wfl)    Outcome Measures: [facility] Daily Activity  Putting on and taking off regular lower body clothing: None  Bathing (including washing, rinsing, drying): None  Putting on and taking off regular upper body clothing: None  Toileting, which includes using toilet, bedpan or urinal: None  Taking care of personal grooming such as brushing teeth: None  Eating Meals: None  Daily Activity - Total Score: 24      Education Documentation  No documentation found.  Education Comments  No comments found.    Pt encouraged to have initial supervision with bathroom activities upon discharge. Pt demonstrated good comprehension of instructions, but tends to be highly indep and does not wait for assistance  Goals:   No skilled OT needs identified

## 2024-05-13 NOTE — PROGRESS NOTES
Physical Therapy    Physical Therapy Evaluation    Patient Name: Kimberly Randle  MRN: [id number]  Today's Date: 5/13/2024   Time Calculation  Start Time: 1029  Stop Time: 1045  Time Calculation (min): 16 min    Assessment/Plan   PT Assessment  End of Session Communication: Bedside nurse, Charge Nurse  End of Session Patient Position: Bed, 2 rail up, Alarm on  IP OR SWING BED PT PLAN  Inpatient or Swing Bed: Inpatient  PT Plan  PT Eval Only Reason: At baseline function  PT - OK to Discharge: Yes      Subjective   General Visit Information:  General  Reason for Referral: ,Near syncope.vomiting  Referred By: RNOEL Diaz CNP  Past Medical History Relevant to Rehab: CABG,CAD,HTN,HLD,TIA  Prior to Session Communication: Bedside nurse  Patient Position Received: Bed, 2 rail up, Alarm on  Home Living:  Home Living  Type of Home: House  Lives With: Alone (Will stay with dtr. ~ one wk; dtr lives close by.)  Home Adaptive Equipment: Walker rolling or standard  Home Layout: One level  Home Access: Stairs to enter with rails (2 stairs)  Prior Level of Function:  Prior Function Per Pt/Caregiver Report  Level of [place]: Independent with ADLs and functional transfers, Independent with homemaking with ambulation  Ambulatory Assistance: Independent  Precautions:  Precautions  Medical Precautions: Cardiac precautions  Vital Signs:       Objective   Pain:  Pain Assessment  Pain Score: 0 - No pain  Cognition:  Cognition  Overall Cognitive Status: Within Functional Limits    General Assessments:    Functional Assessments:  Bed Mobility  Bed Mobility:  (ind)    Transfers  Transfer:  (ind)    Ambulation/Gait Training  Ambulation/Gait Training Performed:  (Ind w/o a device 100 ft)  Extremity/Trunk Assessments:    Outcome Measures:  [facility] Basic Mobility  Turning from your back to your side while in a flat bed without using bedrails: None  Moving from lying on your back to sitting on the side of a flat bed without using bedrails:  None  Moving to and from bed to chair (including a wheelchair): None  Standing up from a chair using your arms (e.g. wheelchair or bedside chair): None  To walk in hospital room: None  Climbing 3-5 steps with railing: None  Basic Mobility - Total Score: 24    Encounter Problems       Encounter Problems (Active)       Safety       LTG - Patient will adhere to hip precautions during ADL's and transfers       Start:  05/13/24            LTG - Patient will demonstrate safety requirements appropriate to situation/environment       Start:  05/13/24            LTG - Patient will utilize safety techniques       Start:  05/13/24            STG - Patient locks brakes on wheelchair       Start:  05/13/24            STG - Patient uses call light consistently to request assistance with transfers       Start:  05/13/24            STG - Patient uses gait belt during all transfers       Start:  05/13/24            Goal 1       Start:  05/13/24            Goal 2       Start:  05/13/24            Goal 3       Start:  05/13/24                   Education Documentation  No documentation found.  Education Comments  No comments found.

## 2024-05-14 ENCOUNTER — HOME HEALTH ADMISSION (OUTPATIENT)
Dept: HOME HEALTH SERVICES | Facility: HOME HEALTH | Age: 84
End: 2024-05-14
Payer: MEDICARE

## 2024-05-14 ENCOUNTER — DOCUMENTATION (OUTPATIENT)
Dept: HOME HEALTH SERVICES | Facility: HOME HEALTH | Age: 84
End: 2024-05-14
Payer: MEDICARE

## 2024-05-14 VITALS
TEMPERATURE: 97.2 F | HEIGHT: 64 IN | WEIGHT: 105 LBS | DIASTOLIC BLOOD PRESSURE: 84 MMHG | BODY MASS INDEX: 17.93 KG/M2 | OXYGEN SATURATION: 95 % | HEART RATE: 83 BPM | SYSTOLIC BLOOD PRESSURE: 161 MMHG | RESPIRATION RATE: 16 BRPM

## 2024-05-14 PROCEDURE — 2500000001 HC RX 250 WO HCPCS SELF ADMINISTERED DRUGS (ALT 637 FOR MEDICARE OP)

## 2024-05-14 PROCEDURE — 2500000006 HC RX 250 W HCPCS SELF ADMINISTERED DRUGS (ALT 637 FOR ALL PAYERS)

## 2024-05-14 PROCEDURE — G0378 HOSPITAL OBSERVATION PER HR: HCPCS

## 2024-05-14 RX ADMIN — ASPIRIN 81 MG: 81 TABLET, COATED ORAL at 09:18

## 2024-05-14 RX ADMIN — Medication 1 TABLET: at 09:18

## 2024-05-14 RX ADMIN — ROSUVASTATIN CALCIUM 40 MG: 10 TABLET, FILM COATED ORAL at 09:18

## 2024-05-14 NOTE — PROGRESS NOTES
05/14/24 1351   Discharge Planning   Living Arrangements Alone   Support Systems Children;Family members   Assistance Needed transportation   Type of Residence Private residence   Number of Stairs to Enter Residence 4   Number of Stairs Within Residence 10   Patient expects to be discharged to: [facility]   Does the patient need discharge transport arranged? No     All requested paperwork/documentation/information faxed to [facility].  Patient's granddaughter to pick her up and transfer her to the facilty when the facility receives and reviews the paperwork.  Felicia FERMIN TCC  [id number]:  Facility is now stating they need a face to face assessment of patient prior to her being transported but are willing to onsite.  I explained how that would be impossible.  Patient's RN called facility; awaiting approval for patient to go.  Her granddaughter is here to transport her.  Felicia FERMIN TCC

## 2024-05-14 NOTE — HH CARE COORDINATION
.This referral has been made a Non Admit with  Home Care due to  Patient discharging to SNF . If you have further questions, feel free to reach out to our office at [phone]. Thank you, [facility] Intake.

## 2024-05-14 NOTE — DISCHARGE SUMMARY
Discharge Diagnosis  Vomiting, unspecified vomiting type, unspecified whether nausea present    Issues Requiring Follow-Up  Patient fully evaluated on May 14. Vitals stable. Labs show normal CBC with slightly elevated Cr at 1.07 and GFR 51. Ct of the head performed on 5/13/24 shows no acute intracranial abnormalities. Patient to be discharged today.      Discharge Meds     Your medication list        START taking these medications        Instructions Last Dose Given Next Dose Due   ondansetron ODT 4 mg disintegrating tablet  Commonly known as: Zofran-ODT      Take 1 tablet (4 mg) by mouth every 6 hours if needed for nausea or vomiting for up to 3 days.              ASK your doctor about these medications        Instructions Last Dose Given Next Dose Due   aspirin 81 mg EC tablet           MONIKA-CITRATE ORAL           donepezil 5 mg tablet  Commonly known as: Aricept      Take 1 tablet (5 mg) by mouth once daily at bedtime for 14 days.       estradiol 0.01 % (0.1 mg/gram) vaginal cream  Commonly known as: Estrace      Use a 1/2 gram in the vagina twice weekly at bedtime       FISH OIL ORAL           glucosamine-chondroitin 1,500-1,200 mg/30 mL liquid           losartan 50 mg tablet  Commonly known as: Cozaar           rosuvastatin 40 mg tablet  Commonly known as: Crestor      Take 1 tablet (40 mg) by mouth once daily.                 Where to Get Your Medications        These medications were sent to [id number] [place] [id number]      Phone: [phone]   ondansetron ODT 4 mg disintegrating tablet         Test Results Pending At Discharge  Pending Labs       No current pending labs.            Hospital Course         Gabriel Bolanos MD   Physician  Internal Medicine     Discharge Summary     Signed     Date of Service: 5/13/2024  2:39 PM     Signed         Discharge Diagnosis  Vomiting, unspecified vomiting type, unspecified whether nausea present     Issues  Requiring Follow-Up  Patient fully evaluated on May 13. Vitals are stable. Labs show normal CBC with normal BUN, Cr 1.07, and GFR 51. Troponin and lipase were normal. Chest XR performed on 5/12/24 without abnormalities. Echocardiogram performed on 5/12/24 showed normal ejection fraction with mitral and aortic regurgitation. Bilateral carotid duplex performed today showed bilateral carotids with less than 50% stenosis. CT of the head is pending. Plan to discharge today.      Discharge Meds      Your medication list          START taking these medications         Instructions Last Dose Given Next Dose Due   ondansetron ODT 4 mg disintegrating tablet  Commonly known as: Zofran-ODT       Take 1 tablet (4 mg) by mouth every 6 hours if needed for nausea or vomiting for up to 3 days.                    ASK your doctor about these medications         Instructions Last Dose Given Next Dose Due   aspirin 81 mg EC tablet               MONIKA-CITRATE ORAL               donepezil 5 mg tablet  Commonly known as: Aricept       Take 1 tablet (5 mg) by mouth once daily at bedtime for 14 days.          estradiol 0.01 % (0.1 mg/gram) vaginal cream  Commonly known as: Estrace       Use a 1/2 gram in the vagina twice weekly at bedtime          FISH OIL ORAL               glucosamine-chondroitin 1,500-1,200 mg/30 mL liquid               losartan 50 mg tablet  Commonly known as: Cozaar               rosuvastatin 40 mg tablet  Commonly known as: Crestor       Take 1 tablet (40 mg) by mouth once daily.                        Where to Get Your Medications          These medications were sent to [id number]  [address] [place], [place] [id number]        Phone: [phone]   ondansetron ODT 4 mg disintegrating tablet            Test Results Pending At Discharge  Pending Labs         No current pending labs.                Hospital Course         Gabriel Bolanos MD   Physician  Internal Medicine     H&P      Addendum     Date of Service: 5/12/2024  9:21 AM      Addendum        Expand All Collapse All    History Of Present Illness  Kimberly Randle is a 83 y.o. female who presents to the emergency department from home with complaints of vomiting and near syncope. She states she thinks she ate some soup that was spoiled yesterday.  She reports the soup it been in her refrigerator for about a week and a half.  She states she had 2 episodes of vomiting last night and felt very weak and lightheaded.  She called her daughter and asked her to take her to the hospital.  She denies any chest pain or shortness of breath.  The patient states she did not pass out however she felt like she was going to.  She denies any dizziness at present.  She does complain of some nausea and weakness currently.  She is alert and oriented times person, place and event.  She was confused on the month and year.  She has a past medical history of coronary artery disease, hypertension, hyperlipidemia and TIA.     ED Course  VS reviewed  Labs reviewed, results below,   EKG interpreted by the ED provider as normal sinus rhythm with a rate of 63. O ischemia or injury pattern  CXR reviewed, results below  Zofran      Past Medical History  CAD, hypertension, hyperlipidemia, TIA     Surgical History  Surgical History             Past Surgical History:   Procedure Laterality Date    CORONARY ARTERY BYPASS GRAFT   04/04/2018     CABG    OTHER SURGICAL HISTORY   04/27/2018     Surgery    OTHER SURGICAL HISTORY   12/03/2020     Hysterectomy    OTHER SURGICAL HISTORY   12/03/2020     Lumpectomy            Social History  She reports that she has quit smoking. Her smoking use included cigarettes. She has been exposed to tobacco smoke. She has never used smokeless tobacco. She reports that she does not currently use alcohol. She reports that she does not use drugs. She lives at home alone but her daughter lives close. She does not use any ambulatory aids.      Family History  Family History               Family History   Problem Relation Name Age of Onset    Heart failure Mother        Cancer Father                Allergies  Atorvastatin, Amoxicillin, and Codeine     Review of Systems   Constitutional: Negative.    HENT: Negative.     Eyes: Negative.    Respiratory: Negative.     Cardiovascular: Negative.    Gastrointestinal:  Positive for nausea and vomiting.   Endocrine: Negative.    Genitourinary: Negative.    Musculoskeletal: Negative.    Skin: Negative.    Allergic/Immunologic: Negative.    Neurological:  Positive for light-headedness.        Near syncope   Hematological: Negative.    Psychiatric/Behavioral: Negative.           Physical Exam  Constitutional:       General: She is not in acute distress.     Appearance: Normal appearance.   HENT:      Head: Normocephalic.      Nose: Nose normal.      Mouth/Throat:      Mouth: Mucous membranes are moist.      Pharynx: Oropharynx is clear.   Eyes:      Extraocular Movements: Extraocular movements intact.      Pupils: Pupils are equal, round, and reactive to light.   Cardiovascular:      Rate and Rhythm: Normal rate and regular rhythm.   Pulmonary:      Effort: Pulmonary effort is normal.      Breath sounds: Normal breath sounds.   Abdominal:      General: Bowel sounds are normal.      Palpations: Abdomen is soft.      Tenderness: There is no abdominal tenderness.   Musculoskeletal:         General: Normal range of motion.      Cervical back: Normal range of motion.      Right lower leg: No edema.      Left lower leg: No edema.   Skin:     General: Skin is warm and dry.      Capillary Refill: Capillary refill takes less than 2 seconds.   Neurological:      General: No focal deficit present.      Mental Status: She is alert and oriented to person, place, and time.      Cranial Nerves: No cranial nerve deficit.      Sensory: No sensory deficit.   Psychiatric:         Mood and Affect: Mood normal.         Behavior:  "Behavior normal.            Last Recorded Vitals  Blood pressure 147/67, pulse 60, temperature 36.2 °C (97.2 °F), temperature source Temporal, resp. rate 18, height 1.626 m (5' 4\"), weight 47.6 kg (105 lb), SpO2 95%.     Relevant Results            Results for orders placed or performed during the hospital encounter of 05/12/24 (from the past 24 hour(s))   CBC with Differential   Result Value Ref Range     WBC 8.7 4.4 - 11.3 x10*3/uL     nRBC 0.0 0.0 - 0.0 /100 WBCs     RBC 4.23 4.00 - 5.20 x10*6/uL     Hemoglobin 13.3 12.0 - 16.0 g/dL     Hematocrit 39.1 36.0 - 46.0 %     MCV 92 80 - 100 fL     MCH 31.4 26.0 - 34.0 pg     MCHC 34.0 32.0 - 36.0 g/dL     RDW 13.4 11.5 - 14.5 %     Platelets 171 150 - 450 x10*3/uL     Neutrophils % 78.5 40.0 - 80.0 %     Immature Granulocytes %, Automated 0.5 0.0 - 0.9 %     Lymphocytes % 16.3 13.0 - 44.0 %     Monocytes % 4.1 2.0 - 10.0 %     Eosinophils % 0.3 0.0 - 6.0 %     Basophils % 0.3 0.0 - 2.0 %     Neutrophils Absolute 6.82 (H) 1.60 - 5.50 x10*3/uL     Immature Granulocytes Absolute, Automated 0.04 0.00 - 0.50 x10*3/uL     Lymphocytes Absolute 1.42 0.80 - 3.00 x10*3/uL     Monocytes Absolute 0.36 0.05 - 0.80 x10*3/uL     Eosinophils Absolute 0.03 0.00 - 0.40 x10*3/uL     Basophils Absolute 0.03 0.00 - 0.10 x10*3/uL   Comprehensive Metabolic Panel   Result Value Ref Range     Glucose 169 (H) 74 - 99 mg/dL     Sodium 136 136 - 145 mmol/L     Potassium 3.7 3.5 - 5.3 mmol/L     Chloride 103 98 - 107 mmol/L     Bicarbonate 25 21 - 32 mmol/L     Anion Gap 12 10 - 20 mmol/L     Urea Nitrogen 29 (H) 6 - 23 mg/dL     Creatinine 0.82 0.50 - 1.05 mg/dL     eGFR 70 >60 mL/min/1.73m*2     Calcium 10.0 8.6 - 10.3 mg/dL     Albumin 4.2 3.4 - 5.0 g/dL     Alkaline Phosphatase 69 33 - 136 U/L     Total Protein 6.4 6.4 - 8.2 g/dL     AST 27 9 - 39 U/L     Bilirubin, Total 0.6 0.0 - 1.2 mg/dL     ALT 23 7 - 45 U/L   Lipase   Result Value Ref Range     Lipase 35 9 - 82 U/L   Troponin I, High " Sensitivity, Initial   Result Value Ref Range     Troponin I, High Sensitivity 7 0 - 13 ng/L   Troponin I, High Sensitivity   Result Value Ref Range     Troponin I, High Sensitivity 7 0 - 13 ng/L   XR chest 1 view     Result Date: 5/12/2024  Interpreted By:  Julio Butler, STUDY: XR CHEST 1 VIEW;  5/12/2024 3:39 am   INDICATION: Signs/Symptoms:chest pain.   COMPARISON: 1/6/2019   ACCESSION NUMBER(S): [id number]   ORDERING CLINICIAN: MADELINE HAMM   FINDINGS: Postsurgical change with sternotomy and mediastinal clips. The cardiac silhouette is normal in size. No focal airspace consolidation or pleural effusion. No pneumothorax.        No airspace consolidation or pleural effusion.   MACRO: None   Signed by: Julio Butler 5/12/2024 3:47 AM Dictation workstation:   [id number]       Scheduled medications  [START ON 5/13/2024] pantoprazole, 40 mg, oral, Daily before breakfast   Or  [START ON 5/13/2024] pantoprazole, 40 mg, intravenous, Daily before breakfast  perflutren lipid microspheres, 0.5-10 mL of dilution, intravenous, Once in imaging  perflutren protein A microsphere, 0.5 mL, intravenous, Once in imaging  sodium chloride, 500 mL, intravenous, Once  sulfur hexafluoride microsphr, 2 mL, intravenous, Once in imaging        Continuous medications  PRN medications  PRN medications: acetaminophen, ondansetron **OR** ondansetron         Assessment/Plan   Principal Problem:    Vomiting, unspecified vomiting type, unspecified whether nausea present  Near syncope  Admit to general medicine under Dr. Bolanos  Acute, observation, telemetry  Defer consults to attending  Check Orthostatics  Head CT  Carotid US  Echo (last echo 5/22/2023, EF 60-65%)  Clear liquid diet, may advance diet as tolerated  Zofran  IVF NS @50ml/hr for 500mL  CBC and CMP in AM  PT/OT     Chronic conditions  CAD, hypertension, hyperlipidemia, TIA  Continue home medications as appropriate when home med rec is complete     DVT Prophylaxis  Heparin  SCDs               I spent 45 minutes in the professional and overall care of this patient.  Patient fully evaluated and plan as above     Edilma LUCIANO Joe, APRN-CNP                  Revision History          Pertinent Physical Exam At Time of Discharge  Physical Exam     Outpatient Follow-Up         Future Appointments   Date Time Provider Department Center   9/30/2024 11:30 AM Daniel Love MD [id number] [place]   11/6/2024  1:00 PM Mamadou Barrett MD [id number] [facility]                Revision History         Pertinent Physical Exam At Time of Discharge  Physical Exam    Outpatient Follow-Up  Future Appointments   Date Time Provider Department Center   9/30/2024 11:30 AM Daniel Love MD [id number] [place]   11/6/2024  1:00 PM Mamadou Barrett MD [id number] [facility]

## 2024-05-14 NOTE — PROGRESS NOTES
05/14/24 1001   Discharge Planning   Living Arrangements Alone   Support Systems Children;Family members   Assistance Needed independent   Type of Residence Private residence   Number of Stairs to Enter Residence 4   Number of Stairs Within Residence 10   Patient expects to be discharged to: respite @ AL   Does the patient need discharge transport arranged? No     Patient's daughter called [employer] and [facility] is assisting with placing patient in an assisted living for temporary respite while she and family work to find a permanent residence/assisted living possibly in [facility] her daughter Donna lives.  Carolyn from [facility] called and reported to me that patient will going to [facility] in [place] for respite.  Clinicals, MAR, PT/OT, discharge summary sent via  [facility].  Care Coordination team following.  Felicia FERMIN TCC

## 2024-05-22 LAB
ATRIAL RATE: 63 BPM
P AXIS: 59 DEGREES
PR INTERVAL: 138 MS
Q ONSET: 251 MS
QRS COUNT: 10 BEATS
QRS DURATION: 95 MS
QT INTERVAL: 480 MS
QTC CALCULATION(BAZETT): 492 MS
QTC FREDERICIA: 488 MS
R AXIS: -37 DEGREES
T AXIS: 65 DEGREES
T OFFSET: 491 MS
VENTRICULAR RATE: 63 BPM

## 2024-06-17 ENCOUNTER — TELEPHONE (OUTPATIENT)
Dept: OBSTETRICS AND GYNECOLOGY | Facility: CLINIC | Age: 84
End: 2024-06-17

## 2024-06-18 ENCOUNTER — APPOINTMENT (OUTPATIENT)
Dept: OBSTETRICS AND GYNECOLOGY | Facility: CLINIC | Age: 84
End: 2024-06-18
Payer: MEDICARE

## 2024-07-23 ENCOUNTER — PROCEDURE VISIT (OUTPATIENT)
Dept: OBSTETRICS AND GYNECOLOGY | Facility: CLINIC | Age: 84
End: 2024-07-23
Payer: MEDICARE

## 2024-07-23 VITALS
TEMPERATURE: 97.6 F | OXYGEN SATURATION: 98 % | BODY MASS INDEX: 18.16 KG/M2 | SYSTOLIC BLOOD PRESSURE: 175 MMHG | HEART RATE: 75 BPM | RESPIRATION RATE: 16 BRPM | HEIGHT: 64 IN | DIASTOLIC BLOOD PRESSURE: 83 MMHG | WEIGHT: 106.4 LBS

## 2024-07-23 DIAGNOSIS — N81.9 VAGINAL VAULT PROLAPSE: Primary | ICD-10-CM

## 2024-07-23 DIAGNOSIS — Z46.89 PESSARY MAINTENANCE: ICD-10-CM

## 2024-07-23 PROCEDURE — 99213 OFFICE O/P EST LOW 20 MIN: CPT | Performed by: NURSE PRACTITIONER

## 2024-07-23 ASSESSMENT — LIFESTYLE VARIABLES
AUDIT-C TOTAL SCORE: 0
HOW MANY STANDARD DRINKS CONTAINING ALCOHOL DO YOU HAVE ON A TYPICAL DAY: PATIENT DOES NOT DRINK
HOW OFTEN DO YOU HAVE A DRINK CONTAINING ALCOHOL: NEVER
SKIP TO QUESTIONS 9-10: 1
HOW OFTEN DO YOU HAVE SIX OR MORE DRINKS ON ONE OCCASION: NEVER

## 2024-07-23 ASSESSMENT — ENCOUNTER SYMPTOMS
DEPRESSION: 0
OCCASIONAL FEELINGS OF UNSTEADINESS: 1
LOSS OF SENSATION IN FEET: 0

## 2024-07-23 ASSESSMENT — PAIN SCALES - GENERAL: PAINLEVEL: 0-NO PAIN

## 2024-07-23 NOTE — PROGRESS NOTES
Pessary Check  This is a 83 y.o. with a #3 ring with support pessary in place to help manage vaginal vault prolapse, here for a routine pessary check.    Date of last check: 4/25/2024    Today she reports:  Pessary comfortable: Yes  Vaginal bleeding: No  Abnormal vaginal discharge: No  Using vaginal estrogen: No    Exam:  Physical Exam  Constitutional:       General: She is not in acute distress.     Appearance: Normal appearance.   HENT:      Head: Normocephalic and atraumatic.   Pulmonary:      Effort: Pulmonary effort is normal.   Psychiatric:         Mood and Affect: Mood normal.         Behavior: Behavior normal.         Thought Content: Thought content normal.         Judgment: Judgment normal.      Procedure:   Pessary position on presentation: Normal  Pessary removed and cleaned without difficulty  Speculum exam: Vaginal mucosa was examined for the presence of irritation, trauma and/or bleeding   Erosions: No   Vaginal atrophy: Yes   Silver nitrate applied: No  Pessary replaced without difficulty.    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 Comorbidities include: HTN and CAD s/p CABG 20 years ago. The patient has dementia and now resides in an assisted living facility.     Diagnoses:  #1 Vaginal vault prolapse  #2 Pessary maintenance     Plan:  1. VVP, pessary maintenance   - The #3 ring with support pessary was removed. Speculum exam did not reveal any areas of active bleeding, erosions, or granulation tissue. Pessary was cleaned and replaced back.  - The patient is satisfied with the pessary and plans to continue use.   - Risks, alternative and routine maintenance reviewed with patient.  - No need for vaginal estrogen therapy at this time.    She will return to the office in 3 months for a pessary recheck with SIERRA Stone or sooner should she have any problems.    All questions and concerns were answered and  addressed.    Scribe Attestation  By signing my name below, I, Meaghan Lehman, attest that this documentation has been prepared under the direction and in the presence of SIERRA Stone on 07/23/2024 at 5:02 PM.     I, Kimberly Graves, personally performed the services described in the documentation as scribed in my presence and confirm it is both complete and accurate.

## 2024-09-30 ENCOUNTER — APPOINTMENT (OUTPATIENT)
Dept: NEUROLOGY | Facility: CLINIC | Age: 84
End: 2024-09-30
Payer: MEDICARE

## 2024-09-30 ENCOUNTER — OFFICE VISIT (OUTPATIENT)
Dept: NEUROLOGY | Facility: CLINIC | Age: 84
End: 2024-09-30
Payer: MEDICARE

## 2024-09-30 VITALS
HEIGHT: 64 IN | HEART RATE: 80 BPM | RESPIRATION RATE: 16 BRPM | SYSTOLIC BLOOD PRESSURE: 128 MMHG | DIASTOLIC BLOOD PRESSURE: 76 MMHG | BODY MASS INDEX: 18.92 KG/M2 | WEIGHT: 110.8 LBS | TEMPERATURE: 97.7 F

## 2024-09-30 DIAGNOSIS — G30.1 LATE ONSET ALZHEIMER'S DISEASE WITH BEHAVIORAL DISTURBANCE (MULTI): Primary | ICD-10-CM

## 2024-09-30 DIAGNOSIS — F03.911 AGITATION DUE TO DEMENTIA (MULTI): ICD-10-CM

## 2024-09-30 DIAGNOSIS — F03.A0 MILD DEMENTIA WITHOUT BEHAVIORAL DISTURBANCE, PSYCHOTIC DISTURBANCE, MOOD DISTURBANCE, OR ANXIETY, UNSPECIFIED DEMENTIA TYPE: ICD-10-CM

## 2024-09-30 DIAGNOSIS — F02.818 LATE ONSET ALZHEIMER'S DISEASE WITH BEHAVIORAL DISTURBANCE (MULTI): Primary | ICD-10-CM

## 2024-09-30 PROCEDURE — 3078F DIAST BP <80 MM HG: CPT | Performed by: PSYCHIATRY & NEUROLOGY

## 2024-09-30 PROCEDURE — 1160F RVW MEDS BY RX/DR IN RCRD: CPT | Performed by: PSYCHIATRY & NEUROLOGY

## 2024-09-30 PROCEDURE — G2211 COMPLEX E/M VISIT ADD ON: HCPCS | Performed by: PSYCHIATRY & NEUROLOGY

## 2024-09-30 PROCEDURE — 3074F SYST BP LT 130 MM HG: CPT | Performed by: PSYCHIATRY & NEUROLOGY

## 2024-09-30 PROCEDURE — 99215 OFFICE O/P EST HI 40 MIN: CPT | Performed by: PSYCHIATRY & NEUROLOGY

## 2024-09-30 PROCEDURE — 1159F MED LIST DOCD IN RCRD: CPT | Performed by: PSYCHIATRY & NEUROLOGY

## 2024-09-30 PROCEDURE — 1036F TOBACCO NON-USER: CPT | Performed by: PSYCHIATRY & NEUROLOGY

## 2024-09-30 RX ORDER — QUETIAPINE FUMARATE 25 MG/1
12.5 TABLET, FILM COATED ORAL NIGHTLY
Qty: 30 TABLET | Refills: 11 | Status: SHIPPED | OUTPATIENT
Start: 2024-09-30 | End: 2025-09-30

## 2024-09-30 ASSESSMENT — ENCOUNTER SYMPTOMS
LOSS OF SENSATION IN FEET: 0
DEPRESSION: 0
OCCASIONAL FEELINGS OF UNSTEADINESS: 1

## 2024-09-30 NOTE — PROGRESS NOTES
Di Randle 84 y.o.  HPI  The patient and her daughter both attend the appointment today.  Her daughter feels that her memory is getting worse.  She is now in assisted living.  The patient is still dressing herself and feeding herself.  The patient is not wearing her hearing aids on a consistent basis.  She is compliant with the and episil but could not tolerate the 10 mg dose and is only on the 5 mg dose.  Her daughter feels that she is somewhat unsteady while walking and that is worse if she moves quickly or if she bends down.    Her daughter states that the family has banda of  for healthcare and finances and the patient is living well.      Review of Systems   All other systems reviewed and are negative.       Patient Active Problem List   Diagnosis    Abnormal echocardiogram    Cervicalgia    Coronary artery disease    Essential hypertension, benign    H/O four vessel coronary artery bypass graft    Hyperlipidemia    Pelvic organ prolapse quantification stage 2 cystocele    Premature atrial beats    Urinary frequency    Vaginal atrophy    Vaginal vault prolapse    Dementia (Multi)    Atypical chest pain    Anxiety disorder    Vomiting, unspecified vomiting type, unspecified whether nausea present        Past Medical History:   Diagnosis Date    Personal history of other diseases of the circulatory system     History of coronary artery disease    Personal history of other diseases of the circulatory system     History of cardiac disorder    Personal history of other diseases of the circulatory system 12/06/2018    History of coronary artery disease    Personal history of other endocrine, nutritional and metabolic disease     History of hyperlipidemia    Personal history of other specified conditions     History of palpitations    Personal history of transient ischemic attack (TIA), and cerebral infarction without residual deficits     History of transient cerebral ischemia         Past Surgical History:   Procedure Laterality Date    CORONARY ARTERY BYPASS GRAFT  04/04/2018    CABG    OTHER SURGICAL HISTORY  04/27/2018    Surgery    OTHER SURGICAL HISTORY  12/03/2020    Hysterectomy    OTHER SURGICAL HISTORY  12/03/2020    Lumpectomy        Social History     Socioeconomic History    Marital status:      Spouse name: Not on file    Number of children: Not on file    Years of education: Not on file    Highest education level: Not on file   Occupational History    Not on file   Tobacco Use    Smoking status: Former     Current packs/day: 1.50     Types: Cigarettes     Passive exposure: Past    Smokeless tobacco: Never   Vaping Use    Vaping status: Never Used   Substance and Sexual Activity    Alcohol use: Not Currently    Drug use: Never    Sexual activity: Not on file   Other Topics Concern    Not on file   Social History Narrative    Not on file     Social Determinants of Health     Financial Resource Strain: Low Risk  (6/17/2024)    Received from [facility]    Overall Financial Resource Strain (CARDIA)     Difficulty of Paying Living Expenses: Not very hard   Food Insecurity: No Food Insecurity (6/17/2024)    Received from [facility]    Hunger Vital Sign     Worried About Running Out of Food in the Last Year: Never true     Ran Out of Food in the Last Year: Never true   Transportation Needs: No Transportation Needs (6/17/2024)    Received from [facility]    PRAPARE - Transportation     Lack of Transportation (Medical): No     Lack of Transportation (Non-Medical): No   Physical Activity: Insufficiently Active (6/17/2024)    Received from [facility]    Exercise Vital Sign     Days of Exercise per Week: 3 days     Minutes of Exercise per Session: 20 min   Stress: Stress Concern Present (6/17/2024)    Received from [facility]    [employer] [place] [employer] - Occupational Stress Questionnaire      Feeling of Stress : To some extent   Social Connections: Moderately Isolated (6/17/2024)    Received from [employer]    Social Connection and Isolation Panel [NHANES]     Frequency of Communication with Friends and Family: More than three times a week     Frequency of Social Gatherings with Friends and Family: Three times a week     Attends Church Services: More than 4 times per year     Active Member of Clubs or Organizations: No     Attends Club or Organization Meetings: Never     Marital Status:    Intimate Partner Violence: Not At Risk (6/17/2024)    Received from [employer]    Humiliation, Afraid, Rape, and Kick questionnaire     Fear of Current or Ex-Partner: No     Emotionally Abused: No     Physically Abused: No     Sexually Abused: No   Housing Stability: Low Risk  (6/17/2024)    Received from [employer]    Housing Stability Vital Sign     In the last 12 months, was there a time when you were not able to pay the mortgage or rent on time?: No     In the past 12 months, how many times have you moved where you were living?: 1     At any time in the past 12 months, were you homeless or living in a shelter (including now)?: No   Recent Concern: Housing Stability - High Risk (5/12/2024)    Housing Stability Vital Sign     Unable to Pay for Housing in the Last Year: Yes     Number of Places Lived in the Last Year: 1     Unstable Housing in the Last Year: No        Family History   Problem Relation Name Age of Onset    Heart failure Mother      Cancer Father          Current Outpatient Medications   Medication Instructions    acetaminophen (TYLENOL) 650 mg, oral, Every 4 hours PRN    aspirin 81 mg EC tablet 1 tablet, oral, Daily    calcium citrate/vitamin D2 (MONIKA-CITRATE ORAL) 1 tablet, oral, Daily    coenzyme Q-10 100 mg, oral, Daily    docosahexaenoic acid/epa (FISH OIL ORAL) 1 capsule, oral, Daily    donepezil (ARICEPT) 5 mg, oral, Nightly    donepezil  "(ARICEPT) 5 mg, oral, Nightly    estradiol (Estrace) 0.01 % (0.1 mg/gram) vaginal cream Use a 1/2 gram in the vagina twice weekly at bedtime    glucosamine HCl/chondroitin grove (GLUCOSAMINE-CHONDROITIN ORAL) Take 1 capsule by mouth once daily.    multivitamin with minerals tablet 1 tablet, oral, Daily    pantoprazole (PROTONIX) 40 mg, oral, Daily before breakfast, Do not crush, chew, or split.    potassium/magnesium (MAGNESIUM-POTASSIUM ORAL) 1 capsule, oral, Daily    rosuvastatin (CRESTOR) 40 mg, oral, Daily        Allergies   Allergen Reactions    Atorvastatin GI Upset, Confusion and Other    Amoxicillin Unknown    Codeine Unknown        Objective  /76 (BP Location: Left arm)   Pulse 80   Temp 36.5 °C (97.7 °F)   Resp 16   Ht 1.626 m (5' 4\")   Wt 50.3 kg (110 lb 12.8 oz)   BMI 19.02 kg/m²    GENERAL APPEARANCE:  No distress, alert and cooperative.     MENTAL STATE: The patient is alert and oriented x 2 with mild confusion.  She is unable to remember any objects out of 3 at 5 minutes.  Attention span and concentration were normal. Language testing was normal for comprehension, repetition, expression, and naming. Calculation was intact. The patient could correctly interpret a picture, and copy a diagram. General fund of knowledge was intact.  Her Mini-Mental status score examination was 24.    CRANIAL NERVES:  Cranial nerves were normal.      CN 2- Visual Acuity  OD: 20/20 (corrected)   OS: 20/20 (corrected); visual fields full to confrontation.      CN 3, 4, 6-  Pupils round, 4 mm in diameter, equally reactive to light. No ptosis. EOMs normal alignment, full range of movement, no nystagmus     CN 5- Facial sensation intact bilaterally. Normal corneal reflexes.      CN 7- Normal and symmetric facial strength. Nasolabial folds symmetric.     CN 8- Hearing intact to finger rub, whisper.      CN 9- Palate elevates symmetrically. Normal gag reflex.      CN 11- Normal strength of shoulder shrug and neck turning "      CN 12- Tongue midline, with normal bulk and strength; no fasciculations.     MOTOR:  Motor exam was normal. Muscle bulk and tone were normal in both upper and lower extremities. Muscle strength was 5/5 in distal and proximal muscles in both upper and lower extremities. No fasciculations, tremor or other abnormal movements were present.     GAIT:  the patient's gait is mildly unsteady.    Assessment/Plan  The patient is essentially stable from a neurological standpoint.  I will order a nuclear medicine CT PET amyloid scan.  I will continue donepezil at 5 mg a day.  I will give her a trial of Seroquel 12.5 mg p.o. twice daily as needed for agitation.  The patient should continue all of her medicines and stroke risk factor modification.  The patient should try to stay as active as she can both mentally and physically.  We will continue to follow her for sleep and safety issues.  The patient needs to continue using her hearing aids on a consistent basis.  I discussed all these issues in detail with the patient and her daughter and answered all their questions.  The patient follow-up with me in 6 months.

## 2024-10-18 NOTE — PROGRESS NOTES
"Chief Complaint  Initial Evaluation of the Left Shoulder       Subjective      Steve Ro presents to [facility] for an evaluation  of his left shoulder. His left shoulder has been bothering him for several weeks. He reports he was in a motorcycle when he noticed increase in pain to his shoulder. He has pain with range of motion and lifting, reaching and pulling. He reports no prior surgery on his shoulder. He is currently on dialysis and had a fistula placed on his right side.     Allergies   Allergen Reactions    Penicillins Hives and Swelling     Pt states he tolerates cephalosporins        Social History     Socioeconomic History    Marital status:    Tobacco Use    Smoking status: Former     Current packs/day: 0.00     Average packs/day: 1.5 packs/day for 15.0 years (22.5 ttl pk-yrs)     Types: Cigarettes     Start date: 2/15/2006     Quit date: 2/15/2021     Years since quitting: 3.6    Smokeless tobacco: Never   Vaping Use    Vaping status: Never Used   Substance and Sexual Activity    Alcohol use: Yes     Comment: SOCIALLY    Drug use: Never    Sexual activity: Defer        I reviewed the patient's chief complaint, history of present illness, review of systems, past medical history, surgical history, family history, social history, medications, and allergy list.     Review of Systems     Constitutional: Denies fevers, chills, weight loss  Cardiovascular: Denies chest pain, shortness of breath  Skin: Denies rashes, acute skin changes  Neurologic: Denies headache, loss of consciousness  MSK: Left shoulder pain       Vital Signs:   /79   Pulse 81   Ht 170.2 cm (67.01\")   Wt 83.5 kg (184 lb)   SpO2 98%   BMI 28.81 kg/m²            Ortho Exam    Physical Exam  General:Alert. No acute distress     Left upper extremity: forward elevation  to 170 degrees, abduction to 150 degrees, external rotation  to 50 degrees, internal rotation to 80 degrees, 4 plus " Pharmacy Medication History Review    Kimberly Randle is a 83 y.o. female admitted for Vomiting, unspecified vomiting type, unspecified whether nausea present. Pharmacy reviewed the patient's [id number] medications and allergies for accuracy.    The list below reflectives the updated PTA list. Please review each medication in order reconciliation for additional clarification and justification.  Prior to Admission Medications   Prescriptions Last Dose Informant Patient Reported? Taking?   aspirin 81 mg EC tablet Unknown Child Yes No   Sig: Take 1 tablet (81 mg) by mouth once daily.   calcium citrate/vitamin D2 (MONIKA-CITRATE ORAL) Unknown Child Yes No   Sig: Take 1 tablet by mouth once daily.   coenzyme Q-10 100 mg capsule Unknown Child Yes No   Sig: Take 1 capsule (100 mg) by mouth once daily.   docosahexaenoic acid/epa (FISH OIL ORAL) Unknown Child Yes No   Sig: Take 1 capsule by mouth once daily.   donepezil (Aricept) 10 mg tablet Unknown Child Yes No   Sig: Take 0.5 tablets (5 mg) by mouth once daily at bedtime.   donepezil (Aricept) 5 mg tablet Duplicate  No No   Sig: Take 1 tablet (5 mg) by mouth once daily at bedtime for 14 days.   estradiol (Estrace) 0.01 % (0.1 mg/gram) vaginal cream Unknown Child No No   Sig: Use a 1/2 gram in the vagina twice weekly at bedtime   Patient taking differently: Use a 1/2 gram in the vagina twice weekly at bedtime every Tuesday and Friday   glucosamine HCl/chondroitin grove (GLUCOSAMINE-CHONDROITIN ORAL) Unknown Child Yes No   Sig: Take 1 capsule by mouth once daily.   losartan (Cozaar) 50 mg tablet Not Taking Child Yes No   Sig: Take 1 tablet (50 mg) by mouth once daily.   multivitamin with minerals tablet Unknown Child Yes No   Sig: Take 1 tablet by mouth once daily.   potassium/magnesium (MAGNESIUM-POTASSIUM ORAL) Unknown  Yes No   Sig: Take 1 capsule by mouth once daily.   rosuvastatin (Crestor) 40 mg tablet Unknown Child No No   Sig: Take 1 tablet (40 mg) by mouth  once daily.      Facility-Administered Medications: None        The list below reflectives the updated allergy list. Please review each documented allergy for additional clarification and justification.  Allergies  Reviewed by Carolyn Mckinnon RN on 5/12/2024        Severity Reactions Comments    Atorvastatin Medium GI Upset, Confusion, Other     Amoxicillin Low Unknown     Codeine Low Unknown             Below are additional concerns with the patient's PTA list.    Patient's daughter, Donna, contacted for most recent medication history.    Kerrie Alvarez     supraspinatus strength, 5/5 infraspinatus and subscap, non tender to the the anterior shoulder, tender to the AC joint, AV fistula with a long scar to the medial arm, neurovascularly intact, sensation intact to the medial , radial and ulnar nerve      Procedures    Imaging Results (Most Recent)       None             Result Review :       XR Shoulder 2+ View Left (In Office)    Result Date: 10/10/2024  Narrative: XR SHOULDER 2+ VW LEFT Date of Exam: 10/10/2024 8:34 AM EDT Indication: left shoulder pain Comparison: None available. Findings: The distance between the distal clavicle and acromion is slightly prominent in appearance. Glenohumeral joint appears grossly unremarkable. There is some metallic clips in the upper arm. Limited imaging the chest demonstrates linear opacity at the left base favored to represent atelectasis.     Impression: Impression: 1. Distance of the AC joint appears somewhat prominent. Please correlate with site of pain and any history of injury. Evaluation of the contralateral side may be useful. Electronically Signed: Isaac Keating MD  10/10/2024 8:43 AM EDT  Workstation ID: [id number]            Assessment and Plan     Diagnoses and all orders for this visit:    1. Injury of left shoulder, initial encounter (Primary)        The patient presents here today for an evaluation  of his left shoulder.     MRI order placed today to evaluate for internal derangement.     Home exercises given today.      Call or return if worsening symptoms.    Follow Up     After MRI     Patient was given instructions and counseling regarding his condition or for health maintenance advice. Please see specific information pulled into the AVS if appropriate.     Scribed for Steven Gonsales MD by Divya Mitchell.  10/18/24   10:25 EDT    I have personally performed the services described in this document as scribed by the above individual and it is both accurate and complete. Steven Gonslaes MD 10/19/24

## 2024-10-24 ENCOUNTER — PROCEDURE VISIT (OUTPATIENT)
Dept: OBSTETRICS AND GYNECOLOGY | Facility: CLINIC | Age: 84
End: 2024-10-24
Payer: MEDICARE

## 2024-10-24 VITALS
HEIGHT: 64 IN | HEART RATE: 79 BPM | RESPIRATION RATE: 16 BRPM | TEMPERATURE: 98.7 F | OXYGEN SATURATION: 98 % | WEIGHT: 110 LBS | SYSTOLIC BLOOD PRESSURE: 181 MMHG | BODY MASS INDEX: 18.78 KG/M2 | DIASTOLIC BLOOD PRESSURE: 80 MMHG

## 2024-10-24 DIAGNOSIS — N95.2 VAGINAL ATROPHY: ICD-10-CM

## 2024-10-24 DIAGNOSIS — N81.9 VAGINAL VAULT PROLAPSE: Primary | ICD-10-CM

## 2024-10-24 DIAGNOSIS — Z46.89 PESSARY MAINTENANCE: ICD-10-CM

## 2024-10-24 PROCEDURE — G2211 COMPLEX E/M VISIT ADD ON: HCPCS | Performed by: NURSE PRACTITIONER

## 2024-10-24 PROCEDURE — 99213 OFFICE O/P EST LOW 20 MIN: CPT | Performed by: NURSE PRACTITIONER

## 2024-10-24 RX ORDER — ESTRADIOL 0.1 MG/G
CREAM VAGINAL
Qty: 42.5 G | Refills: 3 | Status: SHIPPED | OUTPATIENT
Start: 2024-10-24

## 2024-10-24 RX ORDER — VALACYCLOVIR HYDROCHLORIDE 1 G/1
TABLET, FILM COATED ORAL
COMMUNITY
Start: 2023-12-26

## 2024-10-24 ASSESSMENT — ENCOUNTER SYMPTOMS
OCCASIONAL FEELINGS OF UNSTEADINESS: 1
LOSS OF SENSATION IN FEET: 0
DEPRESSION: 0

## 2024-10-24 ASSESSMENT — PAIN SCALES - GENERAL: PAINLEVEL_OUTOF10: 0-NO PAIN

## 2024-10-24 NOTE — PROGRESS NOTES
Pessary Check    This is a 84 y.o. with a #3 ring pessary here for a routine pessary check.    Date of last check: 7/23/2024    Today she reports:  Pessary comfortable: Yes  Vaginal bleeding:Yes  Abnormal vaginal discharge:No  Using vaginal estrogen:No. She is prescribed vaginal estrogen cream, but reports not using it.   Recently moved into [facility] and they need an rx for any med or any orders for their RNs to apply/help with meds.    Exam:  Physical Exam  Constitutional:       Appearance: Normal appearance.   Genitourinary:      Vulva normal.      No lesions in the vagina.      No vaginal erythema, ulceration or granulation tissue.   Pulmonary:      Effort: Pulmonary effort is normal.   Neurological:      General: No focal deficit present.      Mental Status: She is alert and oriented to person, place, and time.   Psychiatric:         Mood and Affect: Mood normal.         Behavior: Behavior normal.         Thought Content: Thought content normal.         Judgment: Judgment normal.   Vitals reviewed.          Procedure:   Pessary position on presentation: Normal  Pessary removed and cleaned without difficulty  Speculum exam: Vaginal mucosa was examined for the presence of irritation, trauma and/or bleeding   Erosions: Yes   Vaginal atrophy: Yes   Silver nitrate applied :Yes  Pessary replaced without difficulty.    Assessment/Plan:    Kimberly Randle is a 84 y.o. being treated for vaginal vault prolapse and vaginal atrophy.   POP  - The patient is satisfied with the pessary and plans to continue use.   - Risks, alternative and routine maintenance reviewed with patient.        2. Vaginal atrophy  - Granulation tissue treated with silver nitrate today  - Reinforced the importance of using vaginal estrogen cream. Discussed this with daughter as well. Re-prescribed to pharmacy associated with [facility]. Daughter will call mom to remind her of estrogen cream. Happy to work with staff at [facility] as  well if they need order.    She will return to the office in 2-3 months for recheck or sooner should she have any problems.    All questions and concerns were answered and addressed.    Pessary check  Granulation tissue  Reinforced estrogen  Daughter will offer reminders  Nursing home will have order to remind as well    Follow up 3 mos

## 2024-10-29 ENCOUNTER — TELEPHONE (OUTPATIENT)
Dept: CARDIOLOGY | Facility: CLINIC | Age: 84
End: 2024-10-29
Payer: MEDICARE

## 2024-10-29 DIAGNOSIS — E78.2 MIXED HYPERLIPIDEMIA: ICD-10-CM

## 2024-10-29 DIAGNOSIS — G30.1 LATE ONSET ALZHEIMER'S DISEASE WITH BEHAVIORAL DISTURBANCE (MULTI): ICD-10-CM

## 2024-10-29 DIAGNOSIS — F02.818 LATE ONSET ALZHEIMER'S DISEASE WITH BEHAVIORAL DISTURBANCE (MULTI): ICD-10-CM

## 2024-10-29 RX ORDER — ROSUVASTATIN CALCIUM 40 MG/1
40 TABLET, COATED ORAL DAILY
Qty: 90 TABLET | Refills: 3 | Status: SHIPPED | OUTPATIENT
Start: 2024-10-29 | End: 2025-10-29

## 2024-10-29 RX ORDER — DONEPEZIL HYDROCHLORIDE 5 MG/1
5 TABLET, FILM COATED ORAL NIGHTLY
Qty: 90 TABLET | Refills: 3 | Status: SHIPPED | OUTPATIENT
Start: 2024-10-29

## 2024-10-29 NOTE — TELEPHONE ENCOUNTER
[facility] pharmacy called asking for us to send a refill for Rosuvastatin 40mg for a 90 day supply

## 2024-11-06 ENCOUNTER — APPOINTMENT (OUTPATIENT)
Dept: CARDIOLOGY | Facility: CLINIC | Age: 84
End: 2024-11-06
Payer: MEDICARE

## 2024-11-06 VITALS
OXYGEN SATURATION: 98 % | WEIGHT: 113 LBS | BODY MASS INDEX: 19.29 KG/M2 | SYSTOLIC BLOOD PRESSURE: 130 MMHG | HEIGHT: 64 IN | HEART RATE: 82 BPM | DIASTOLIC BLOOD PRESSURE: 72 MMHG

## 2024-11-06 DIAGNOSIS — I49.1 PREMATURE ATRIAL BEATS: Primary | ICD-10-CM

## 2024-11-06 DIAGNOSIS — Z95.1 H/O FOUR VESSEL CORONARY ARTERY BYPASS GRAFT: ICD-10-CM

## 2024-11-06 DIAGNOSIS — E78.5 HYPERLIPIDEMIA, UNSPECIFIED HYPERLIPIDEMIA TYPE: ICD-10-CM

## 2024-11-06 DIAGNOSIS — I25.10 CORONARY ARTERY DISEASE INVOLVING NATIVE CORONARY ARTERY OF NATIVE HEART WITHOUT ANGINA PECTORIS: ICD-10-CM

## 2024-11-06 DIAGNOSIS — I10 ESSENTIAL HYPERTENSION, BENIGN: ICD-10-CM

## 2024-11-06 PROCEDURE — 3078F DIAST BP <80 MM HG: CPT | Performed by: INTERNAL MEDICINE

## 2024-11-06 PROCEDURE — 3075F SYST BP GE 130 - 139MM HG: CPT | Performed by: INTERNAL MEDICINE

## 2024-11-06 PROCEDURE — 1159F MED LIST DOCD IN RCRD: CPT | Performed by: INTERNAL MEDICINE

## 2024-11-06 PROCEDURE — 99213 OFFICE O/P EST LOW 20 MIN: CPT | Performed by: INTERNAL MEDICINE

## 2024-11-06 PROCEDURE — 1036F TOBACCO NON-USER: CPT | Performed by: INTERNAL MEDICINE

## 2024-11-06 NOTE — PROGRESS NOTES
"Di Randle \"Sunitha\" is a 84 y.o. female.    Chief Complaint:  Follow-up coronary artery disease, coronary artery bypass grafting.    HPI    She is developing dementia.  She is now in an assisted living environment.  The [facility] gives her medications and she gets 3 meals per day.  She is gaining some weight.  She denies any chest discomfort although her history is unreliable.  History obtained from family indicates that she is improving.  Still has some agitation at night.  Expresses frustration over memory loss.  Seen and followed by the neurology service.    In May 2023 she presented with symptoms of changing mental status.  We did do a CT of the brain which showed significant small vessel disease.  She was also seen and evaluated by the neurology service.       The patient's   in 2022 when he became suddenly unresponsive at home.  He was found to be in ventricular fibrillatory arrest and could not be resuscitated.     A stress thallium study performed in 2018 showed a fixed apical defect with an ejection fraction of 74%.     Her cardiac history is significant for coronary artery bypass grafting performed in  with a left internal mammary graft to the anterior descending, she has a right internal mammary graft to the right coronary artery. She has a radial artery graft to the diagonal branch and circumflex coronary artery.     Other medical problems including history of hyperlipidemia. She has a history of moderate aortic regurgitation.        Allergies  Medication    · atorvastatin   Adverse Reaction; Confusion; Gatrointestinal upset; Severe stomach cramping; Recorded By: Ana Chang; 2022 5:01:02 PM   · codeine   Recorded By: Majo Miller; 2018 1:52:11 PM     Family History  Mother    · Family history of congestive heart failure (V17.49) (Z82.49)     Social History  Problems    · Does not use illicit drugs (V49.89) (Z78.9)   · Former smoker " "(V15.82) (Z87.891)   · Occasional alcohol use        Review of Systems   Constitutional: Positive for malaise/fatigue.   Cardiovascular:  Positive for dyspnea on exertion.   Musculoskeletal:  Positive for arthritis.   Neurological:  Positive for difficulty with concentration.  Memory loss.    Current Outpatient Medications   Medication Sig Dispense Refill    aspirin 81 mg EC tablet Take 1 tablet (81 mg) by mouth once daily.      calcium citrate/vitamin D2 (MONIKA-CITRATE ORAL) Take 1 tablet by mouth once daily.      coenzyme Q-10 100 mg capsule Take 1 capsule (100 mg) by mouth once daily.      docosahexaenoic acid/epa (FISH OIL ORAL) Take 1 capsule by mouth once daily.      donepezil (Aricept) 5 mg tablet Take 1 tablet (5 mg) by mouth once daily at bedtime. 90 tablet 3    estradiol (Estrace) 0.01 % (0.1 mg/gram) vaginal cream Use a 1/2 gram in the vagina twice weekly at bedtime 42.5 g 3    glucosamine HCl/chondroitin grove (GLUCOSAMINE-CHONDROITIN ORAL) Take 1 capsule by mouth once daily.      multivitamin with minerals tablet Take 1 tablet by mouth once daily.      potassium/magnesium (MAGNESIUM-POTASSIUM ORAL) Take 1 capsule by mouth once daily.      QUEtiapine (SeroqueL) 25 mg tablet Take 0.5 tablets (12.5 mg) by mouth once daily at bedtime. 30 tablet 11    rosuvastatin (Crestor) 40 mg tablet Take 1 tablet (40 mg) by mouth once daily. 90 tablet 3    valACYclovir (Valtrex) 1 gram tablet TAKE TWO TABLETS BY MOUTH TWO TIMES A DAY FOR TWO DOSES AS NEEDED FOR COLD SORE      pantoprazole (ProtoNix) 40 mg EC tablet Take 1 tablet (40 mg) by mouth once daily in the morning. Take before meals. Do not crush, chew, or split. 30 tablet 0     No current facility-administered medications for this visit.        Visit Vitals  /72 (BP Location: Left arm)   Pulse 82   Ht 1.626 m (5' 4\")   Wt 51.3 kg (113 lb)   SpO2 98%   BMI 19.40 kg/m²   Smoking Status Former   BSA 1.52 m²        Objective     Constitutional:       Appearance: Not " in distress.   Neck:      Vascular: JVD normal.   Pulmonary:      Breath sounds: Normal breath sounds.   Cardiovascular:      Normal rate. Regular rhythm. Normal S1. Normal S2.       Murmurs: There is no murmur.      No gallop.    Pulses:     Intact distal pulses.   Edema:     Peripheral edema absent.   Abdominal:      General: There is no distension.      Palpations: Abdomen is soft.   Neurological:      Mental Status: Alert.         Lab Review:   Lab Results   Component Value Date     05/13/2024    K 4.3 05/13/2024     05/13/2024    CO2 30 05/13/2024    BUN 17 05/13/2024    CREATININE 1.07 (H) 05/13/2024    GLUCOSE 86 05/13/2024    CALCIUM 9.5 05/13/2024     Lab Results   Component Value Date    CHOL 156 11/23/2022    TRIG 125 11/23/2022    HDL 61.4 11/23/2022       Assessment:    1.  Coronary disease.  Status post coronary bypass grafting with 3 arterial bypasses.  No anginal symptoms.  Conservative medical management.    2.  Hypertension.  Blood pressures are normal.    3.  Hyperlipidemia.  LDL is not at goal but her overall prognosis is based on her dementia.

## 2024-11-07 ENCOUNTER — HOSPITAL ENCOUNTER (OUTPATIENT)
Dept: RADIOLOGY | Facility: CLINIC | Age: 84
Discharge: HOME | End: 2024-11-07
Payer: MEDICARE

## 2024-11-07 DIAGNOSIS — G30.1 LATE ONSET ALZHEIMER'S DISEASE WITH BEHAVIORAL DISTURBANCE (MULTI): ICD-10-CM

## 2024-11-07 DIAGNOSIS — F02.818 LATE ONSET ALZHEIMER'S DISEASE WITH BEHAVIORAL DISTURBANCE (MULTI): ICD-10-CM

## 2024-11-07 DIAGNOSIS — F03.A0 MILD DEMENTIA WITHOUT BEHAVIORAL DISTURBANCE, PSYCHOTIC DISTURBANCE, MOOD DISTURBANCE, OR ANXIETY, UNSPECIFIED DEMENTIA TYPE: ICD-10-CM

## 2024-11-07 PROCEDURE — 3430000001 HC RX 343 DIAGNOSTIC RADIOPHARMACEUTICALS: Performed by: PSYCHIATRY & NEUROLOGY

## 2024-11-07 PROCEDURE — 78814 PET IMAGE W/CT LMTD: CPT | Performed by: NUCLEAR MEDICINE

## 2024-11-07 PROCEDURE — A9586 FLORBETAPIR F18: HCPCS | Performed by: PSYCHIATRY & NEUROLOGY

## 2024-11-13 ENCOUNTER — TELEPHONE (OUTPATIENT)
Dept: OBSTETRICS AND GYNECOLOGY | Facility: CLINIC | Age: 84
End: 2024-11-13
Payer: MEDICARE

## 2024-11-13 NOTE — TELEPHONE ENCOUNTER
Pts daughter (hudson) contacted.   Mom with high functioning Altzheimers pulled her pessary out.   Initially it was thought to have been her tube of estrogen cream, but it wasn't.  Daughter asking about option for surgery.    Nurse will make guille aware to advise next best step.

## 2024-11-18 NOTE — TELEPHONE ENCOUNTER
Daughter (hudson) contacted.    Recommendation to see dr baker to discuss surgery provided.  Understanding voiced.   She will call back to schedule.

## 2024-11-22 ENCOUNTER — TELEMEDICINE (OUTPATIENT)
Dept: NEUROLOGY | Facility: CLINIC | Age: 84
End: 2024-11-22
Payer: MEDICARE

## 2024-11-22 DIAGNOSIS — F03.911 AGITATION DUE TO DEMENTIA (MULTI): ICD-10-CM

## 2024-11-22 DIAGNOSIS — F03.A0 MILD DEMENTIA WITHOUT BEHAVIORAL DISTURBANCE, PSYCHOTIC DISTURBANCE, MOOD DISTURBANCE, OR ANXIETY, UNSPECIFIED DEMENTIA TYPE: Primary | ICD-10-CM

## 2024-11-22 PROCEDURE — 1159F MED LIST DOCD IN RCRD: CPT | Performed by: PSYCHIATRY & NEUROLOGY

## 2024-11-22 PROCEDURE — 1160F RVW MEDS BY RX/DR IN RCRD: CPT | Performed by: PSYCHIATRY & NEUROLOGY

## 2024-11-22 PROCEDURE — G2211 COMPLEX E/M VISIT ADD ON: HCPCS | Performed by: PSYCHIATRY & NEUROLOGY

## 2024-11-22 PROCEDURE — 1036F TOBACCO NON-USER: CPT | Performed by: PSYCHIATRY & NEUROLOGY

## 2024-11-22 PROCEDURE — 99215 OFFICE O/P EST HI 40 MIN: CPT | Performed by: PSYCHIATRY & NEUROLOGY

## 2024-11-22 RX ORDER — LOSARTAN POTASSIUM 25 MG/1
25 TABLET ORAL DAILY
COMMUNITY
Start: 2024-11-08

## 2024-11-22 RX ORDER — DONEPEZIL HYDROCHLORIDE 10 MG/1
10 TABLET, FILM COATED ORAL NIGHTLY
Qty: 30 TABLET | Refills: 11 | Status: SHIPPED | OUTPATIENT
Start: 2024-11-22 | End: 2025-11-22

## 2024-11-22 NOTE — PROGRESS NOTES
Di Randle 84 y.o.  HPI  The patient, her daughter and son-in-law all attended the video appointment today.  Her daughter feels that her processing is slowing and that she has difficulty paying attention.  She states that within 5 or 10 seconds of telling her something she will forget.  She is compliant with her donepezil 5 mg a day.  The patient's daughter feels that she will confabulate a lot.  The patient states that she is wearing her hearing aids.  The patient is currently in assisted living.  The patient has not been taking Seroquel.  The patient is pliant with her rosuvastatin and aspirin.      The patient's CT amyloid PET scan is positive for beta amyloid plaques.      Review of Systems   All other systems reviewed and are negative.       Patient Active Problem List   Diagnosis    Abnormal echocardiogram    Cervicalgia    Coronary artery disease    Essential hypertension, benign    H/O four vessel coronary artery bypass graft    Hyperlipidemia    Pelvic organ prolapse quantification stage 2 cystocele    Premature atrial beats    Urinary frequency    Vaginal atrophy    Vaginal vault prolapse    Dementia    Atypical chest pain    Anxiety    Vomiting, unspecified vomiting type, unspecified whether nausea present        Past Medical History:   Diagnosis Date    Personal history of other diseases of the circulatory system     History of coronary artery disease    Personal history of other diseases of the circulatory system     History of cardiac disorder    Personal history of other diseases of the circulatory system 12/06/2018    History of coronary artery disease    Personal history of other endocrine, nutritional and metabolic disease     History of hyperlipidemia    Personal history of other specified conditions     History of palpitations    Personal history of transient ischemic attack (TIA), and cerebral infarction without residual deficits     History of transient cerebral ischemia         Past Surgical History:   Procedure Laterality Date    CORONARY ARTERY BYPASS GRAFT  04/04/2018    CABG    OTHER SURGICAL HISTORY  04/27/2018    Surgery    OTHER SURGICAL HISTORY  12/03/2020    Hysterectomy    OTHER SURGICAL HISTORY  12/03/2020    Lumpectomy        Social History     Socioeconomic History    Marital status:      Spouse name: Not on file    Number of children: Not on file    Years of education: Not on file    Highest education level: Not on file   Occupational History    Not on file   Tobacco Use    Smoking status: Former     Current packs/day: 1.50     Types: Cigarettes     Passive exposure: Past    Smokeless tobacco: Never   Vaping Use    Vaping status: Never Used   Substance and Sexual Activity    Alcohol use: Not Currently    Drug use: Never    Sexual activity: Not on file   Other Topics Concern    Not on file   Social History Narrative    Not on file     Social Drivers of Health     Financial Resource Strain: Low Risk  (6/17/2024)    Received from [facility]    Overall Financial Resource Strain (CARDIA)     Difficulty of Paying Living Expenses: Not very hard   Food Insecurity: No Food Insecurity (6/17/2024)    Received from [facility]    Hunger Vital Sign     Worried About Running Out of Food in the Last Year: Never true     Ran Out of Food in the Last Year: Never true   Transportation Needs: No Transportation Needs (6/17/2024)    Received from [facility]    PRAPARE - Transportation     Lack of Transportation (Medical): No     Lack of Transportation (Non-Medical): No   Physical Activity: Insufficiently Active (6/17/2024)    Received from [facility]    Exercise Vital Sign     Days of Exercise per Week: 3 days     Minutes of Exercise per Session: 20 min   Stress: Stress Concern Present (6/17/2024)    Received from [facility]    [employer] - Occupational Stress Questionnaire     Feeling  of Stress : To some extent   Social Connections: Moderately Isolated (6/17/2024)    Received from [employer]    Social Connection and Isolation Panel [NHANES]     Frequency of Communication with Friends and Family: More than three times a week     Frequency of Social Gatherings with Friends and Family: Three times a week     Attends Muslim Services: More than 4 times per year     Active Member of Clubs or Organizations: No     Attends Club or Organization Meetings: Never     Marital Status:    Intimate Partner Violence: Not At Risk (6/17/2024)    Received from [employer]    Humiliation, Afraid, Rape, and Kick questionnaire     Fear of Current or Ex-Partner: No     Emotionally Abused: No     Physically Abused: No     Sexually Abused: No   Housing Stability: Low Risk  (6/17/2024)    Received from [employer]    Housing Stability Vital Sign     In the last 12 months, was there a time when you were not able to pay the mortgage or rent on time?: No     In the past 12 months, how many times have you moved where you were living?: 1     At any time in the past 12 months, were you homeless or living in a shelter (including now)?: No   Recent Concern: Housing Stability - High Risk (5/12/2024)    Housing Stability Vital Sign     Unable to Pay for Housing in the Last Year: Yes     Number of Places Lived in the Last Year: 1     Unstable Housing in the Last Year: No        Family History   Problem Relation Name Age of Onset    Heart failure Mother      Cancer Father          Current Outpatient Medications   Medication Instructions    aspirin 81 mg EC tablet 1 tablet, Daily    calcium citrate/vitamin D2 (MONIKA-CITRATE ORAL) 1 tablet, Daily    coenzyme Q-10 100 mg, Daily    docosahexaenoic acid/epa (FISH OIL ORAL) 1 capsule, Daily    donepezil (ARICEPT) 5 mg, oral, Nightly    estradiol (Estrace) 0.01 % (0.1 mg/gram) vaginal cream Use a 1/2 gram in the vagina twice weekly at bedtime     glucosamine HCl/chondroitin grove (GLUCOSAMINE-CHONDROITIN ORAL) Take 1 capsule by mouth once daily.    losartan (COZAAR) 25 mg, Daily    multivitamin with minerals tablet 1 tablet, Daily    pantoprazole (PROTONIX) 40 mg, oral, Daily before breakfast, Do not crush, chew, or split.    potassium/magnesium (MAGNESIUM-POTASSIUM ORAL) 1 capsule, Daily    QUEtiapine (SEROQUEL) 12.5 mg, oral, Nightly    rosuvastatin (CRESTOR) 40 mg, oral, Daily    valACYclovir (Valtrex) 1 gram tablet TAKE TWO TABLETS BY MOUTH TWO TIMES A DAY FOR TWO DOSES AS NEEDED FOR COLD SORE        Allergies   Allergen Reactions    Atorvastatin GI Upset, Confusion and Other    Amoxicillin Unknown    Codeine Unknown        Objective  There were no vitals taken for this visit.   GENERAL APPEARANCE:  No distress, alert and cooperative.     MENTAL STATE:  The patient is alert and oriented x 2 with mild confusion.  She is unable to remember any objects out of 3 at 5 minutes.  Attention span and concentration were normal. Language testing was normal for comprehension, repetition, expression, and naming. Calculation was intact. The patient could correctly interpret a picture, and copy a diagram. General fund of knowledge was intact.      CRANIAL NERVES:  Cranial nerves were normal.      CN 2- Visual Acuity  OD: 20/20 (corrected)   OS: 20/20 (corrected); visual fields full to confrontation.      CN 3, 4, 6-  Pupils round, 4 mm in diameter, equally reactive to light. No ptosis. EOMs normal alignment, full range of movement, no nystagmus     CN 5- Facial sensation intact bilaterally. Normal corneal reflexes.      CN 7- Normal and symmetric facial strength. Nasolabial folds symmetric.     CN 8- Hearing intact to finger rub, whisper.      CN 9- Palate elevates symmetrically. Normal gag reflex.      CN 11- Normal strength of shoulder shrug and neck turning      CN 12- Tongue midline, with normal bulk and strength; no fasciculations.     MOTOR:  Motor exam was  normal. Muscle bulk and tone were normal in both upper and lower extremities. Muscle strength was 5/5 in distal and proximal muscles in both upper and lower extremities. No fasciculations, tremor or other abnormal movements were present.     GAIT: The patient Station and gait are mildly unsteady.    Assessment/Plan   The patient is slightly worse from a neurological standpoint.  The patient needs an appointment with Dr. Almanza.  I will order a MRI brain without contrast on a 3 Karine machine.  I will continue donepezil at 5 mg a day.  The patient should continue all of her medicines and stroke risk factor modification.  The patient should try to stay as active as she can both mentally and physically.  We will continue to follow her for sleep and safety issues.  The patient needs to continue using her hearing aids on a consistent basis.  I discussed all these issues in detail with the patient and her and family and answered all their questions.  The patient follow-up with me in 6 months.

## 2024-11-22 NOTE — PATIENT INSTRUCTIONS
The patient is slightly worse from a neurological standpoint.  The patient needs an appointment with Dr. Almanza.  I will order a MRI brain without contrast on a 3 Karine machine.  I will continue donepezil at 5 mg a day.  The patient should continue all of her medicines and stroke risk factor modification.  The patient should try to stay as active as she can both mentally and physically.  We will continue to follow her for sleep and safety issues.  The patient needs to continue using her hearing aids on a consistent basis.  I discussed all these issues in detail with the patient and her and family and answered all their questions.  The patient follow-up with me in 6 months.

## 2024-12-05 ENCOUNTER — TELEPHONE (OUTPATIENT)
Dept: NEUROLOGY | Facility: CLINIC | Age: 84
End: 2024-12-05
Payer: MEDICARE

## 2024-12-05 NOTE — TELEPHONE ENCOUNTER
You sent in a script for pt to increase donepezil to 10mg daily on 11/22/24 - pt's daughter states that for the past week pt has been having uncontrolled diarrhea and last night even had it while asleep.   Please advise.

## 2024-12-18 ENCOUNTER — HOSPITAL ENCOUNTER (OUTPATIENT)
Dept: RADIOLOGY | Facility: HOSPITAL | Age: 84
Discharge: HOME | End: 2024-12-18
Payer: MEDICARE

## 2024-12-18 DIAGNOSIS — F03.A0 MILD DEMENTIA WITHOUT BEHAVIORAL DISTURBANCE, PSYCHOTIC DISTURBANCE, MOOD DISTURBANCE, OR ANXIETY, UNSPECIFIED DEMENTIA TYPE: ICD-10-CM

## 2024-12-18 PROCEDURE — 70551 MRI BRAIN STEM W/O DYE: CPT | Performed by: RADIOLOGY

## 2024-12-18 PROCEDURE — 70551 MRI BRAIN STEM W/O DYE: CPT

## 2024-12-25 ENCOUNTER — HOSPITAL ENCOUNTER (EMERGENCY)
Facility: HOSPITAL | Age: 84
Discharge: HOME | DRG: 522 | End: 2024-12-25
Attending: EMERGENCY MEDICINE
Payer: MEDICARE

## 2024-12-25 ENCOUNTER — APPOINTMENT (OUTPATIENT)
Dept: RADIOLOGY | Facility: HOSPITAL | Age: 84
DRG: 522 | End: 2024-12-25
Payer: MEDICARE

## 2024-12-25 VITALS
OXYGEN SATURATION: 96 % | BODY MASS INDEX: 18.33 KG/M2 | RESPIRATION RATE: 16 BRPM | DIASTOLIC BLOOD PRESSURE: 70 MMHG | SYSTOLIC BLOOD PRESSURE: 148 MMHG | HEART RATE: 55 BPM | WEIGHT: 110 LBS | TEMPERATURE: 98.2 F | HEIGHT: 65 IN

## 2024-12-25 DIAGNOSIS — R19.7 VOMITING AND DIARRHEA: Primary | ICD-10-CM

## 2024-12-25 DIAGNOSIS — R11.10 VOMITING AND DIARRHEA: Primary | ICD-10-CM

## 2024-12-25 LAB
ALBUMIN SERPL BCP-MCNC: 4 G/DL (ref 3.4–5)
ALP SERPL-CCNC: 78 U/L (ref 33–136)
ALT SERPL W P-5'-P-CCNC: 16 U/L (ref 7–45)
ANION GAP SERPL CALC-SCNC: 11 MMOL/L (ref 10–20)
AST SERPL W P-5'-P-CCNC: 21 U/L (ref 9–39)
BASOPHILS # BLD AUTO: 0.04 X10*3/UL (ref 0–0.1)
BASOPHILS NFR BLD AUTO: 0.4 %
BILIRUB SERPL-MCNC: 0.5 MG/DL (ref 0–1.2)
BUN SERPL-MCNC: 28 MG/DL (ref 6–23)
CALCIUM SERPL-MCNC: 9.3 MG/DL (ref 8.6–10.3)
CHLORIDE SERPL-SCNC: 105 MMOL/L (ref 98–107)
CO2 SERPL-SCNC: 26 MMOL/L (ref 21–32)
CREAT SERPL-MCNC: 0.89 MG/DL (ref 0.5–1.05)
EGFRCR SERPLBLD CKD-EPI 2021: 63 ML/MIN/1.73M*2
EOSINOPHIL # BLD AUTO: 0.05 X10*3/UL (ref 0–0.4)
EOSINOPHIL NFR BLD AUTO: 0.5 %
ERYTHROCYTE [DISTWIDTH] IN BLOOD BY AUTOMATED COUNT: 13.9 % (ref 11.5–14.5)
FLUAV RNA RESP QL NAA+PROBE: NOT DETECTED
FLUBV RNA RESP QL NAA+PROBE: NOT DETECTED
GLUCOSE SERPL-MCNC: 136 MG/DL (ref 74–99)
HCT VFR BLD AUTO: 36.8 % (ref 36–46)
HGB BLD-MCNC: 12.2 G/DL (ref 12–16)
IMM GRANULOCYTES # BLD AUTO: 0.06 X10*3/UL (ref 0–0.5)
IMM GRANULOCYTES NFR BLD AUTO: 0.6 % (ref 0–0.9)
LYMPHOCYTES # BLD AUTO: 1.65 X10*3/UL (ref 0.8–3)
LYMPHOCYTES NFR BLD AUTO: 17.8 %
MCH RBC QN AUTO: 30.1 PG (ref 26–34)
MCHC RBC AUTO-ENTMCNC: 33.2 G/DL (ref 32–36)
MCV RBC AUTO: 91 FL (ref 80–100)
MONOCYTES # BLD AUTO: 0.62 X10*3/UL (ref 0.05–0.8)
MONOCYTES NFR BLD AUTO: 6.7 %
NEUTROPHILS # BLD AUTO: 6.85 X10*3/UL (ref 1.6–5.5)
NEUTROPHILS NFR BLD AUTO: 74 %
NRBC BLD-RTO: 0 /100 WBCS (ref 0–0)
PLATELET # BLD AUTO: 143 X10*3/UL (ref 150–450)
POTASSIUM SERPL-SCNC: 3.7 MMOL/L (ref 3.5–5.3)
PROT SERPL-MCNC: 6.4 G/DL (ref 6.4–8.2)
RBC # BLD AUTO: 4.05 X10*6/UL (ref 4–5.2)
SARS-COV-2 RNA RESP QL NAA+PROBE: NOT DETECTED
SODIUM SERPL-SCNC: 138 MMOL/L (ref 136–145)
WBC # BLD AUTO: 9.3 X10*3/UL (ref 4.4–11.3)

## 2024-12-25 PROCEDURE — 2500000004 HC RX 250 GENERAL PHARMACY W/ HCPCS (ALT 636 FOR OP/ED): Performed by: STUDENT IN AN ORGANIZED HEALTH CARE EDUCATION/TRAINING PROGRAM

## 2024-12-25 PROCEDURE — 74177 CT ABD & PELVIS W/CONTRAST: CPT

## 2024-12-25 PROCEDURE — 36415 COLL VENOUS BLD VENIPUNCTURE: CPT | Performed by: EMERGENCY MEDICINE

## 2024-12-25 PROCEDURE — 87636 SARSCOV2 & INF A&B AMP PRB: CPT | Performed by: EMERGENCY MEDICINE

## 2024-12-25 PROCEDURE — 85025 COMPLETE CBC W/AUTO DIFF WBC: CPT | Performed by: EMERGENCY MEDICINE

## 2024-12-25 PROCEDURE — 99285 EMERGENCY DEPT VISIT HI MDM: CPT | Mod: 25 | Performed by: EMERGENCY MEDICINE

## 2024-12-25 PROCEDURE — 96375 TX/PRO/DX INJ NEW DRUG ADDON: CPT

## 2024-12-25 PROCEDURE — 96374 THER/PROPH/DIAG INJ IV PUSH: CPT | Mod: 59

## 2024-12-25 PROCEDURE — 2550000001 HC RX 255 CONTRASTS: Performed by: EMERGENCY MEDICINE

## 2024-12-25 PROCEDURE — 96376 TX/PRO/DX INJ SAME DRUG ADON: CPT

## 2024-12-25 PROCEDURE — 80053 COMPREHEN METABOLIC PANEL: CPT | Performed by: EMERGENCY MEDICINE

## 2024-12-25 PROCEDURE — 74177 CT ABD & PELVIS W/CONTRAST: CPT | Performed by: RADIOLOGY

## 2024-12-25 PROCEDURE — 2500000004 HC RX 250 GENERAL PHARMACY W/ HCPCS (ALT 636 FOR OP/ED): Performed by: EMERGENCY MEDICINE

## 2024-12-25 PROCEDURE — 96361 HYDRATE IV INFUSION ADD-ON: CPT

## 2024-12-25 RX ORDER — PROCHLORPERAZINE EDISYLATE 5 MG/ML
5 INJECTION INTRAMUSCULAR; INTRAVENOUS ONCE
Status: COMPLETED | OUTPATIENT
Start: 2024-12-25 | End: 2024-12-25

## 2024-12-25 RX ORDER — ONDANSETRON HYDROCHLORIDE 2 MG/ML
4 INJECTION, SOLUTION INTRAVENOUS ONCE
Status: COMPLETED | OUTPATIENT
Start: 2024-12-25 | End: 2024-12-25

## 2024-12-25 RX ORDER — LOPERAMIDE HYDROCHLORIDE 2 MG/1
2 CAPSULE ORAL 4 TIMES DAILY PRN
Qty: 20 CAPSULE | Refills: 0 | Status: SHIPPED | OUTPATIENT
Start: 2024-12-25 | End: 2024-12-30 | Stop reason: HOSPADM

## 2024-12-25 RX ORDER — ONDANSETRON HYDROCHLORIDE 8 MG/1
8 TABLET, FILM COATED ORAL EVERY 8 HOURS PRN
Qty: 15 TABLET | Refills: 0 | Status: SHIPPED | OUTPATIENT
Start: 2024-12-25

## 2024-12-25 RX ADMIN — ONDANSETRON 4 MG: 2 INJECTION, SOLUTION INTRAMUSCULAR; INTRAVENOUS at 06:10

## 2024-12-25 RX ADMIN — IOHEXOL 75 ML: 350 INJECTION, SOLUTION INTRAVENOUS at 08:46

## 2024-12-25 RX ADMIN — SODIUM CHLORIDE 1000 ML: 9 INJECTION, SOLUTION INTRAVENOUS at 06:57

## 2024-12-25 RX ADMIN — ONDANSETRON 4 MG: 2 INJECTION, SOLUTION INTRAMUSCULAR; INTRAVENOUS at 06:57

## 2024-12-25 RX ADMIN — PROCHLORPERAZINE EDISYLATE 5 MG: 5 INJECTION INTRAMUSCULAR; INTRAVENOUS at 08:21

## 2024-12-25 ASSESSMENT — PAIN SCALES - GENERAL
PAINLEVEL_OUTOF10: 0 - NO PAIN
PAINLEVEL_OUTOF10: 0 - NO PAIN

## 2024-12-25 ASSESSMENT — PAIN - FUNCTIONAL ASSESSMENT: PAIN_FUNCTIONAL_ASSESSMENT: 0-10

## 2024-12-25 ASSESSMENT — PAIN DESCRIPTION - PROGRESSION: CLINICAL_PROGRESSION: NOT CHANGED

## 2024-12-25 NOTE — ED TRIAGE NOTES
Pt BIBA from [facility] c/o Nausea/ vomiting/ and diarrhea that has been going on for an hour. Pt denies any abdominal pain. Pt got bariatric surgery 10 years ago. Pt has hx of TIA, alzheimer's, GERD, and hyperlipidemia

## 2024-12-25 NOTE — ED PROVIDER NOTES
HPI   Chief Complaint   Patient presents with    Vomiting    Diarrhea       Chief complaint: Vomiting    History of present illness: Patient is an 84-year-old female with history of hypertension GERD Alzheimer's disease TIA and status post bariatric surgery presenting to the emergency department with complaints of vomiting and diarrhea.  According to EMS as well as the patient, she has been ill for approximate the past 1 hour.  During the past 1 hour, the patient has had vomiting and diarrhea.  Patient states that there is no fever.  The patient denies any abdominal discomfort.  Concern ambulance was called and the patient was brought to the emergency department for further evaluation there are no other complaints at this time.      History provided by:  Patient   used: No            Patient History   Past Medical History:   Diagnosis Date    Personal history of other diseases of the circulatory system     History of coronary artery disease    Personal history of other diseases of the circulatory system     History of cardiac disorder    Personal history of other diseases of the circulatory system 12/06/2018    History of coronary artery disease    Personal history of other endocrine, nutritional and metabolic disease     History of hyperlipidemia    Personal history of other specified conditions     History of palpitations    Personal history of transient ischemic attack (TIA), and cerebral infarction without residual deficits     History of transient cerebral ischemia     Past Surgical History:   Procedure Laterality Date    CORONARY ARTERY BYPASS GRAFT  04/04/2018    CABG    OTHER SURGICAL HISTORY  04/27/2018    Surgery    OTHER SURGICAL HISTORY  12/03/2020    Hysterectomy    OTHER SURGICAL HISTORY  12/03/2020    Lumpectomy     Family History   Problem Relation Name Age of Onset    Heart failure Mother      Cancer Father       Social History     Tobacco Use    Smoking status: Former      Current packs/day: 1.50     Types: Cigarettes     Passive exposure: Past    Smokeless tobacco: Never   Vaping Use    Vaping status: Never Used   Substance Use Topics    Alcohol use: Not Currently    Drug use: Never       Physical Exam   ED Triage Vitals [12/25/24 0528]   Temperature Heart Rate Respirations BP   36.8 °C (98.2 °F) 61 14 162/81      Pulse Ox Temp Source Heart Rate Source Patient Position   95 % Temporal Monitor Lying      BP Location FiO2 (%)     Right arm --       Physical Exam  Constitutional:       Appearance: Normal appearance.   HENT:      Head: Normocephalic and atraumatic.      Right Ear: External ear normal.      Left Ear: External ear normal.      Nose: Nose normal.      Mouth/Throat:      Mouth: Mucous membranes are moist.   Eyes:      General: Lids are normal.      Extraocular Movements: Extraocular movements intact.      Pupils: Pupils are equal, round, and reactive to light.   Cardiovascular:      Rate and Rhythm: Normal rate and regular rhythm.      Heart sounds: Normal heart sounds.   Pulmonary:      Effort: Pulmonary effort is normal.      Breath sounds: Normal breath sounds.   Abdominal:      General: Abdomen is flat.      Palpations: Abdomen is soft.      Tenderness: There is no abdominal tenderness. There is no guarding or rebound.   Musculoskeletal:         General: No deformity. Normal range of motion.      Cervical back: Normal range of motion and neck supple.   Skin:     General: Skin is warm.      Capillary Refill: Capillary refill takes less than 2 seconds.      Coloration: Skin is not jaundiced.   Neurological:      General: No focal deficit present.      Mental Status: She is alert and oriented to person, place, and time.   Psychiatric:         Mood and Affect: Mood is anxious.         Behavior: Behavior normal.           ED Course & MDM   Diagnoses as of 12/26/24 1621   Vomiting and diarrhea                 No data recorded     Deer Coma Scale Score: 14 (12/25/24 0558 :  Pham Berkowitz RN)                           Medical Decision Making  Medical Decision Making: Patient remained stable during her time in the emergency department.  CBC demonstrated no significant abnormalities Chem-7 and LFTs were all within normal notes COVID and influenza screens were both within normal limits meanwhile the patient's CAT scan of the abdomen and pelvis with IV contrast demonstrated no significant acute abnormalities.    Patient presents to the emergency department with complaints of nausea and vomiting.  Workup was performed as above and demonstrated no significant abnormalities.  The patient was reassured she was given IV hydration in the emergency department as well as 2 doses of Zofran.  Finally, the patient was still feeling unwell and and as result was given the Compazine.  After this, the patient had significant improvement of her symptoms given the patient's otherwise negative workup I am comfortable the patient be discharged home she was given a prescription for loperamide as well as Zofran for symptom control at home she was instructed to return to the emergency department for any worsening symptoms patient's family.  Patient expressed understanding and agreement the patient was then transferred back by ambulance in otherwise stable condition.    Amount and/or Complexity of Data Reviewed  Labs: ordered. Decision-making details documented in ED Course.  Radiology: ordered. Decision-making details documented in ED Course.        Procedure  Procedures     Jonathan Hartmann MD  12/26/24 1949

## 2024-12-26 ENCOUNTER — HOSPITAL ENCOUNTER (INPATIENT)
Facility: HOSPITAL | Age: 84
DRG: 522 | End: 2024-12-26
Attending: EMERGENCY MEDICINE | Admitting: INTERNAL MEDICINE
Payer: MEDICARE

## 2024-12-26 ENCOUNTER — APPOINTMENT (OUTPATIENT)
Dept: RADIOLOGY | Facility: HOSPITAL | Age: 84
DRG: 522 | End: 2024-12-26
Payer: MEDICARE

## 2024-12-26 DIAGNOSIS — K59.00 CONSTIPATION, UNSPECIFIED CONSTIPATION TYPE: ICD-10-CM

## 2024-12-26 DIAGNOSIS — S72.001A CLOSED FRACTURE OF NECK OF RIGHT FEMUR, INITIAL ENCOUNTER: Primary | ICD-10-CM

## 2024-12-26 LAB
ABO GROUP (TYPE) IN BLOOD: NORMAL
ALBUMIN SERPL BCP-MCNC: 3.8 G/DL (ref 3.4–5)
ALP SERPL-CCNC: 80 U/L (ref 33–136)
ALT SERPL W P-5'-P-CCNC: 21 U/L (ref 7–45)
ANION GAP SERPL CALC-SCNC: 13 MMOL/L (ref 10–20)
ANTIBODY SCREEN: NORMAL
APTT PPP: 30 SECONDS (ref 27–38)
AST SERPL W P-5'-P-CCNC: 25 U/L (ref 9–39)
BASOPHILS # BLD AUTO: 0.03 X10*3/UL (ref 0–0.1)
BASOPHILS NFR BLD AUTO: 0.4 %
BILIRUB SERPL-MCNC: 0.5 MG/DL (ref 0–1.2)
BUN SERPL-MCNC: 23 MG/DL (ref 6–23)
CALCIUM SERPL-MCNC: 8.8 MG/DL (ref 8.6–10.3)
CHLORIDE SERPL-SCNC: 108 MMOL/L (ref 98–107)
CO2 SERPL-SCNC: 25 MMOL/L (ref 21–32)
CREAT SERPL-MCNC: 1.01 MG/DL (ref 0.5–1.05)
EGFRCR SERPLBLD CKD-EPI 2021: 54 ML/MIN/1.73M*2
EOSINOPHIL # BLD AUTO: 0.07 X10*3/UL (ref 0–0.4)
EOSINOPHIL NFR BLD AUTO: 1 %
ERYTHROCYTE [DISTWIDTH] IN BLOOD BY AUTOMATED COUNT: 14 % (ref 11.5–14.5)
GLUCOSE SERPL-MCNC: 114 MG/DL (ref 74–99)
HCT VFR BLD AUTO: 39.9 % (ref 36–46)
HGB BLD-MCNC: 13.2 G/DL (ref 12–16)
HOLD SPECIMEN: NORMAL
HOLD SPECIMEN: NORMAL
IMM GRANULOCYTES # BLD AUTO: 0.03 X10*3/UL (ref 0–0.5)
IMM GRANULOCYTES NFR BLD AUTO: 0.4 % (ref 0–0.9)
INR PPP: 0.9 (ref 0.9–1.1)
LYMPHOCYTES # BLD AUTO: 1.7 X10*3/UL (ref 0.8–3)
LYMPHOCYTES NFR BLD AUTO: 25.2 %
MCH RBC QN AUTO: 30.2 PG (ref 26–34)
MCHC RBC AUTO-ENTMCNC: 33.1 G/DL (ref 32–36)
MCV RBC AUTO: 91 FL (ref 80–100)
MONOCYTES # BLD AUTO: 0.5 X10*3/UL (ref 0.05–0.8)
MONOCYTES NFR BLD AUTO: 7.4 %
NEUTROPHILS # BLD AUTO: 4.42 X10*3/UL (ref 1.6–5.5)
NEUTROPHILS NFR BLD AUTO: 65.6 %
NRBC BLD-RTO: 0 /100 WBCS (ref 0–0)
PLATELET # BLD AUTO: 165 X10*3/UL (ref 150–450)
POTASSIUM SERPL-SCNC: 3.8 MMOL/L (ref 3.5–5.3)
PROT SERPL-MCNC: 6 G/DL (ref 6.4–8.2)
PROTHROMBIN TIME: 10.6 SECONDS (ref 9.8–12.8)
RBC # BLD AUTO: 4.37 X10*6/UL (ref 4–5.2)
RH FACTOR (ANTIGEN D): NORMAL
SODIUM SERPL-SCNC: 142 MMOL/L (ref 136–145)
WBC # BLD AUTO: 6.8 X10*3/UL (ref 4.4–11.3)

## 2024-12-26 PROCEDURE — 2500000005 HC RX 250 GENERAL PHARMACY W/O HCPCS: Performed by: INTERNAL MEDICINE

## 2024-12-26 PROCEDURE — 96374 THER/PROPH/DIAG INJ IV PUSH: CPT

## 2024-12-26 PROCEDURE — 85730 THROMBOPLASTIN TIME PARTIAL: CPT | Performed by: EMERGENCY MEDICINE

## 2024-12-26 PROCEDURE — 73502 X-RAY EXAM HIP UNI 2-3 VIEWS: CPT | Mod: RIGHT SIDE | Performed by: RADIOLOGY

## 2024-12-26 PROCEDURE — 73552 X-RAY EXAM OF FEMUR 2/>: CPT | Mod: RIGHT SIDE | Performed by: RADIOLOGY

## 2024-12-26 PROCEDURE — 1200000002 HC GENERAL ROOM WITH TELEMETRY DAILY

## 2024-12-26 PROCEDURE — 73502 X-RAY EXAM HIP UNI 2-3 VIEWS: CPT | Mod: RT

## 2024-12-26 PROCEDURE — 99285 EMERGENCY DEPT VISIT HI MDM: CPT | Mod: 25 | Performed by: EMERGENCY MEDICINE

## 2024-12-26 PROCEDURE — 85025 COMPLETE CBC W/AUTO DIFF WBC: CPT | Performed by: EMERGENCY MEDICINE

## 2024-12-26 PROCEDURE — 85610 PROTHROMBIN TIME: CPT | Performed by: EMERGENCY MEDICINE

## 2024-12-26 PROCEDURE — 96375 TX/PRO/DX INJ NEW DRUG ADDON: CPT

## 2024-12-26 PROCEDURE — 2550000001 HC RX 255 CONTRASTS: Performed by: EMERGENCY MEDICINE

## 2024-12-26 PROCEDURE — 99223 1ST HOSP IP/OBS HIGH 75: CPT | Performed by: STUDENT IN AN ORGANIZED HEALTH CARE EDUCATION/TRAINING PROGRAM

## 2024-12-26 PROCEDURE — 36415 COLL VENOUS BLD VENIPUNCTURE: CPT | Performed by: EMERGENCY MEDICINE

## 2024-12-26 PROCEDURE — 74177 CT ABD & PELVIS W/CONTRAST: CPT | Mod: FOREIGN READ | Performed by: RADIOLOGY

## 2024-12-26 PROCEDURE — 2500000001 HC RX 250 WO HCPCS SELF ADMINISTERED DRUGS (ALT 637 FOR MEDICARE OP): Performed by: INTERNAL MEDICINE

## 2024-12-26 PROCEDURE — 73552 X-RAY EXAM OF FEMUR 2/>: CPT | Mod: RT

## 2024-12-26 PROCEDURE — 73590 X-RAY EXAM OF LOWER LEG: CPT | Mod: RIGHT SIDE | Performed by: RADIOLOGY

## 2024-12-26 PROCEDURE — 71260 CT THORAX DX C+: CPT | Mod: FOREIGN READ | Performed by: RADIOLOGY

## 2024-12-26 PROCEDURE — 70450 CT HEAD/BRAIN W/O DYE: CPT

## 2024-12-26 PROCEDURE — 71260 CT THORAX DX C+: CPT

## 2024-12-26 PROCEDURE — 70450 CT HEAD/BRAIN W/O DYE: CPT | Performed by: STUDENT IN AN ORGANIZED HEALTH CARE EDUCATION/TRAINING PROGRAM

## 2024-12-26 PROCEDURE — 86901 BLOOD TYPING SEROLOGIC RH(D): CPT | Performed by: EMERGENCY MEDICINE

## 2024-12-26 PROCEDURE — 72125 CT NECK SPINE W/O DYE: CPT

## 2024-12-26 PROCEDURE — 84075 ASSAY ALKALINE PHOSPHATASE: CPT | Performed by: EMERGENCY MEDICINE

## 2024-12-26 PROCEDURE — 2500000004 HC RX 250 GENERAL PHARMACY W/ HCPCS (ALT 636 FOR OP/ED): Performed by: EMERGENCY MEDICINE

## 2024-12-26 PROCEDURE — 72125 CT NECK SPINE W/O DYE: CPT | Performed by: STUDENT IN AN ORGANIZED HEALTH CARE EDUCATION/TRAINING PROGRAM

## 2024-12-26 PROCEDURE — 73590 X-RAY EXAM OF LOWER LEG: CPT | Mod: RT

## 2024-12-26 RX ORDER — ONDANSETRON 4 MG/1
4 TABLET, FILM COATED ORAL EVERY 8 HOURS PRN
Status: DISCONTINUED | OUTPATIENT
Start: 2024-12-26 | End: 2024-12-27

## 2024-12-26 RX ORDER — FENTANYL CITRATE 50 UG/ML
50 INJECTION, SOLUTION INTRAMUSCULAR; INTRAVENOUS ONCE
Status: COMPLETED | OUTPATIENT
Start: 2024-12-26 | End: 2024-12-26

## 2024-12-26 RX ORDER — TALC
3 POWDER (GRAM) TOPICAL NIGHTLY
Status: DISCONTINUED | OUTPATIENT
Start: 2024-12-26 | End: 2024-12-27

## 2024-12-26 RX ORDER — ACETAMINOPHEN 160 MG/5ML
650 SOLUTION ORAL EVERY 4 HOURS PRN
Status: DISCONTINUED | OUTPATIENT
Start: 2024-12-26 | End: 2024-12-27

## 2024-12-26 RX ORDER — ONDANSETRON HYDROCHLORIDE 2 MG/ML
4 INJECTION, SOLUTION INTRAVENOUS ONCE
Status: COMPLETED | OUTPATIENT
Start: 2024-12-26 | End: 2024-12-26

## 2024-12-26 RX ORDER — ACETAMINOPHEN 325 MG/1
650 TABLET ORAL EVERY 4 HOURS PRN
Status: DISCONTINUED | OUTPATIENT
Start: 2024-12-26 | End: 2024-12-27

## 2024-12-26 RX ORDER — ACETAMINOPHEN 650 MG/1
650 SUPPOSITORY RECTAL EVERY 4 HOURS PRN
Status: DISCONTINUED | OUTPATIENT
Start: 2024-12-26 | End: 2024-12-27

## 2024-12-26 RX ORDER — ONDANSETRON HYDROCHLORIDE 2 MG/ML
4 INJECTION, SOLUTION INTRAVENOUS EVERY 8 HOURS PRN
Status: DISCONTINUED | OUTPATIENT
Start: 2024-12-26 | End: 2024-12-27

## 2024-12-26 RX ORDER — OXYCODONE HYDROCHLORIDE 5 MG/1
5 TABLET ORAL EVERY 6 HOURS PRN
Status: DISCONTINUED | OUTPATIENT
Start: 2024-12-26 | End: 2024-12-27

## 2024-12-26 RX ORDER — POLYETHYLENE GLYCOL 3350 17 G/17G
17 POWDER, FOR SOLUTION ORAL DAILY
Status: DISCONTINUED | OUTPATIENT
Start: 2024-12-26 | End: 2024-12-27

## 2024-12-26 RX ORDER — QUETIAPINE FUMARATE 25 MG/1
12.5 TABLET, FILM COATED ORAL NIGHTLY
Status: DISCONTINUED | OUTPATIENT
Start: 2024-12-26 | End: 2024-12-26 | Stop reason: WASHOUT

## 2024-12-26 RX ORDER — DONEPEZIL HYDROCHLORIDE 5 MG/1
10 TABLET, FILM COATED ORAL NIGHTLY
Status: DISCONTINUED | OUTPATIENT
Start: 2024-12-26 | End: 2024-12-26 | Stop reason: WASHOUT

## 2024-12-26 RX ORDER — HYDROMORPHONE HYDROCHLORIDE 0.2 MG/ML
0.2 INJECTION INTRAMUSCULAR; INTRAVENOUS; SUBCUTANEOUS
Status: DISCONTINUED | OUTPATIENT
Start: 2024-12-26 | End: 2024-12-27

## 2024-12-26 RX ORDER — ENOXAPARIN SODIUM 100 MG/ML
40 INJECTION SUBCUTANEOUS EVERY 24 HOURS
Status: DISCONTINUED | OUTPATIENT
Start: 2024-12-26 | End: 2024-12-27

## 2024-12-26 RX ORDER — LOSARTAN POTASSIUM 25 MG/1
25 TABLET ORAL DAILY
Status: DISCONTINUED | OUTPATIENT
Start: 2024-12-26 | End: 2024-12-27

## 2024-12-26 RX ORDER — ROSUVASTATIN CALCIUM 20 MG/1
40 TABLET, COATED ORAL DAILY
Status: DISCONTINUED | OUTPATIENT
Start: 2024-12-26 | End: 2024-12-30 | Stop reason: HOSPADM

## 2024-12-26 RX ORDER — ASPIRIN 81 MG/1
81 TABLET ORAL DAILY
Status: DISCONTINUED | OUTPATIENT
Start: 2024-12-26 | End: 2024-12-27

## 2024-12-26 RX ORDER — PANTOPRAZOLE SODIUM 40 MG/1
40 TABLET, DELAYED RELEASE ORAL
Status: DISCONTINUED | OUTPATIENT
Start: 2024-12-27 | End: 2024-12-27

## 2024-12-26 RX ADMIN — FENTANYL CITRATE 50 MCG: 0.05 INJECTION, SOLUTION INTRAMUSCULAR; INTRAVENOUS at 15:57

## 2024-12-26 RX ADMIN — Medication 3 MG: at 22:33

## 2024-12-26 RX ADMIN — LOSARTAN POTASSIUM 25 MG: 25 TABLET, FILM COATED ORAL at 22:33

## 2024-12-26 RX ADMIN — ASPIRIN 81 MG: 81 TABLET, COATED ORAL at 22:32

## 2024-12-26 RX ADMIN — IOHEXOL 90 ML: 350 INJECTION, SOLUTION INTRAVENOUS at 16:31

## 2024-12-26 RX ADMIN — ONDANSETRON 4 MG: 2 INJECTION, SOLUTION INTRAMUSCULAR; INTRAVENOUS at 16:36

## 2024-12-26 RX ADMIN — ACETAMINOPHEN 650 MG: 325 TABLET, FILM COATED ORAL at 22:45

## 2024-12-26 SDOH — ECONOMIC STABILITY: FOOD INSECURITY: WITHIN THE PAST 12 MONTHS, YOU WORRIED THAT YOUR FOOD WOULD RUN OUT BEFORE YOU GOT THE MONEY TO BUY MORE.: NEVER TRUE

## 2024-12-26 SDOH — SOCIAL STABILITY: SOCIAL INSECURITY: WITHIN THE LAST YEAR, HAVE YOU BEEN AFRAID OF YOUR PARTNER OR EX-PARTNER?: NO

## 2024-12-26 SDOH — ECONOMIC STABILITY: FOOD INSECURITY: WITHIN THE PAST 12 MONTHS, THE FOOD YOU BOUGHT JUST DIDN'T LAST AND YOU DIDN'T HAVE MONEY TO GET MORE.: NEVER TRUE

## 2024-12-26 SDOH — ECONOMIC STABILITY: FOOD INSECURITY: HOW HARD IS IT FOR YOU TO PAY FOR THE VERY BASICS LIKE FOOD, HOUSING, MEDICAL CARE, AND HEATING?: NOT VERY HARD

## 2024-12-26 SDOH — ECONOMIC STABILITY: INCOME INSECURITY: IN THE PAST 12 MONTHS HAS THE ELECTRIC, GAS, OIL, OR WATER COMPANY THREATENED TO SHUT OFF SERVICES IN YOUR HOME?: NO

## 2024-12-26 SDOH — SOCIAL STABILITY: SOCIAL INSECURITY: HAVE YOU HAD THOUGHTS OF HARMING ANYONE ELSE?: NO

## 2024-12-26 SDOH — SOCIAL STABILITY: SOCIAL INSECURITY: WITHIN THE LAST YEAR, HAVE YOU BEEN HUMILIATED OR EMOTIONALLY ABUSED IN OTHER WAYS BY YOUR PARTNER OR EX-PARTNER?: NO

## 2024-12-26 SDOH — ECONOMIC STABILITY: HOUSING INSECURITY: AT ANY TIME IN THE PAST 12 MONTHS, WERE YOU HOMELESS OR LIVING IN A SHELTER (INCLUDING NOW)?: NO

## 2024-12-26 SDOH — ECONOMIC STABILITY: HOUSING INSECURITY: IN THE LAST 12 MONTHS, WAS THERE A TIME WHEN YOU WERE NOT ABLE TO PAY THE MORTGAGE OR RENT ON TIME?: NO

## 2024-12-26 SDOH — SOCIAL STABILITY: SOCIAL INSECURITY
WITHIN THE LAST YEAR, HAVE YOU BEEN KICKED, HIT, SLAPPED, OR OTHERWISE PHYSICALLY HURT BY YOUR PARTNER OR EX-PARTNER?: NO

## 2024-12-26 SDOH — ECONOMIC STABILITY: HOUSING INSECURITY: IN THE PAST 12 MONTHS, HOW MANY TIMES HAVE YOU MOVED WHERE YOU WERE LIVING?: 0

## 2024-12-26 SDOH — SOCIAL STABILITY: SOCIAL INSECURITY
WITHIN THE LAST YEAR, HAVE YOU BEEN RAPED OR FORCED TO HAVE ANY KIND OF SEXUAL ACTIVITY BY YOUR PARTNER OR EX-PARTNER?: NO

## 2024-12-26 SDOH — ECONOMIC STABILITY: TRANSPORTATION INSECURITY: IN THE PAST 12 MONTHS, HAS LACK OF TRANSPORTATION KEPT YOU FROM MEDICAL APPOINTMENTS OR FROM GETTING MEDICATIONS?: NO

## 2024-12-26 SDOH — SOCIAL STABILITY: SOCIAL INSECURITY: WERE YOU ABLE TO COMPLETE ALL THE BEHAVIORAL HEALTH SCREENINGS?: NO

## 2024-12-26 ASSESSMENT — COGNITIVE AND FUNCTIONAL STATUS - GENERAL
DAILY ACTIVITIY SCORE: 15
STANDING UP FROM CHAIR USING ARMS: TOTAL
DRESSING REGULAR UPPER BODY CLOTHING: A LOT
TOILETING: A LOT
WALKING IN HOSPITAL ROOM: TOTAL
MOVING FROM LYING ON BACK TO SITTING ON SIDE OF FLAT BED WITH BEDRAILS: A LITTLE
DRESSING REGULAR LOWER BODY CLOTHING: TOTAL
PATIENT BASELINE BEDBOUND: NO
MOBILITY SCORE: 9
MOVING TO AND FROM BED TO CHAIR: TOTAL
TURNING FROM BACK TO SIDE WHILE IN FLAT BAD: A LOT
CLIMB 3 TO 5 STEPS WITH RAILING: TOTAL
HELP NEEDED FOR BATHING: A LOT

## 2024-12-26 ASSESSMENT — PAIN - FUNCTIONAL ASSESSMENT
PAIN_FUNCTIONAL_ASSESSMENT: 0-10
PAIN_FUNCTIONAL_ASSESSMENT: 0-10

## 2024-12-26 ASSESSMENT — ACTIVITIES OF DAILY LIVING (ADL)
FEEDING YOURSELF: INDEPENDENT
TOILETING: NEEDS ASSISTANCE
HEARING - RIGHT EAR: FUNCTIONAL
PATIENT'S MEMORY ADEQUATE TO SAFELY COMPLETE DAILY ACTIVITIES?: NO
ADEQUATE_TO_COMPLETE_ADL: NO
LACK_OF_TRANSPORTATION: NO
DRESSING YOURSELF: NEEDS ASSISTANCE
WALKS IN HOME: NEEDS ASSISTANCE
ASSISTIVE_DEVICE: WALKER
GROOMING: NEEDS ASSISTANCE
BATHING: NEEDS ASSISTANCE
JUDGMENT_ADEQUATE_SAFELY_COMPLETE_DAILY_ACTIVITIES: NO
HEARING - LEFT EAR: FUNCTIONAL
LACK_OF_TRANSPORTATION: NO

## 2024-12-26 ASSESSMENT — PAIN SCALES - GENERAL
PAINLEVEL_OUTOF10: 4
PAINLEVEL_OUTOF10: 0 - NO PAIN
PAINLEVEL_OUTOF10: 3
PAINLEVEL_OUTOF10: 0 - NO PAIN
PAINLEVEL_OUTOF10: 4

## 2024-12-26 ASSESSMENT — PAIN DESCRIPTION - ORIENTATION
ORIENTATION: RIGHT
ORIENTATION: RIGHT

## 2024-12-26 ASSESSMENT — PATIENT HEALTH QUESTIONNAIRE - PHQ9
SUM OF ALL RESPONSES TO PHQ9 QUESTIONS 1 & 2: 0
1. LITTLE INTEREST OR PLEASURE IN DOING THINGS: NOT AT ALL
2. FEELING DOWN, DEPRESSED OR HOPELESS: NOT AT ALL

## 2024-12-26 ASSESSMENT — PAIN DESCRIPTION - LOCATION
LOCATION: HIP

## 2024-12-26 ASSESSMENT — PAIN DESCRIPTION - DESCRIPTORS: DESCRIPTORS: ACHING

## 2024-12-26 ASSESSMENT — LIFESTYLE VARIABLES
AUDIT-C TOTAL SCORE: 0
HOW OFTEN DO YOU HAVE 6 OR MORE DRINKS ON ONE OCCASION: NEVER
HOW MANY STANDARD DRINKS CONTAINING ALCOHOL DO YOU HAVE ON A TYPICAL DAY: PATIENT DOES NOT DRINK
HOW OFTEN DO YOU HAVE A DRINK CONTAINING ALCOHOL: NEVER
AUDIT-C TOTAL SCORE: 0
SKIP TO QUESTIONS 9-10: 1

## 2024-12-26 ASSESSMENT — PAIN DESCRIPTION - PAIN TYPE: TYPE: ACUTE PAIN

## 2024-12-26 NOTE — CONSULTS
Orthopaedic Surgery Consult H&P    HPI:   Orthopaedic Problems/Injuries: R displaced FNF  Other Injuries: None    84 y.o. female PMH Alzheimer's disease, TIA, s/p bariatric surgery, HTN presents after mGLF sustaining above. Denies numbness, tingling, and open wounds on the affected limb.     PMH: per above/EMR  PSH: per above/EMR  SocHx:      -  Denies tobacco use      -  Denies EtOH use      -  Denies other drug use  FamHx:  Non-contributory to this patient's acute orthopaedic problem.   Allergies: Reviewed in EMR  Meds: Reviewed in EMR    ROS      - 14 point ROS negative except as above    Physical Exam:  Gen: AOx3, NAD  HEENT: normocephalic atraumatic  Psych: appropriate mood and affect  Resp: nonlabored breathing  Cardiac: Extremities WWP, RRR to peripheral palpation  Neuro: CN 2-12 grossly intact  Skin: no rashes    Right lower extremity:  - Skin intact   - Tender to palpation over R hip  - Fires EHL/DF/PF.  - Sensation intact to light touch in sural, saphenous, superficial/deep peroneal, tibial nerve distributions.  - 2+ DP pulse, < 2 seconds capillary refill.    A full secondary exam was performed and all relevant findings discussed and noted above.    Imaging:  AP and lateral radiographs of the R hip display displaced femoral neck fracture.    Labs: Hgb 13.2, Cr 1.01    Assessment:  Orthopaedic Problems/Injuries: R displaced FNF    84 y.o. female PMH Alzheimer's disease, TIA, s/p bariatric surgery, HTN presents after mGLF sustaining above. Closed, NVI injury. Family consented, patient posted for R hemiarthroplasty with Dr Monsalve tomorrow 12/27/24    Plan:  - Admit to medicine, appeciate documentation of medical readiness for OR when able    - WB: NWB RLE  - Abx: none indicated  - Diet: Npo after midnight in anticipation of surgery 12/27  - DVT: hold DVT chemoppx in anticipation of OR if able  - Please obtain all preop labs (CBC,BMP,EKG, CXR, Coags, Type and Screen)    - Dispo: OR tomorrow with   Bello Kim MD  PGY-4 Orthopaedic Surgery  On-call Resident

## 2024-12-26 NOTE — ED PROVIDER NOTES
Chief Complaint   Patient presents with    Fall     History of Present Illness   Kimberly Randle   MRN [id number]    84-year-old presents for evaluation after a ground-level fall this morning.  Patient pressed medical alert device and called her daughter.  Patient arrived by EMS.  History from patient limited due to memory issues however daughter also provided details of history.  Patient states she fell because her feet stick to the floor however daughter clarifies that she does not pick her feet up when she walks.  Patient was not able to stand after the fall and was on the ground when EMS arrived.  She complained of right hip pain.  No known head strike or loss of consciousness.  Patient reports no neck pain, chest pain, back pain, extremity weakness or numbness.  Describes right lower abdominal pain and right hip pain.    Reviewed office visit from 11/6/2024.  Chronic medical conditions including coronary artery disease and bypass grafting as well as dementia.    Physical Exam   T 36.6 °C (97.8 °F)  HR 71  /72  RR 18  O2 97 %      General:  No acute distress.  Head: Atraumatic.  Eyes: No conjunctival injection.   Ears Nose Throat: No epistaxis. Oral mucosa moist.  Neck: Supple.  Cardiovascular: Extremities warm.  2+ dorsalis pedis pulse right and left.  Regular rhythm.  Pulmonary: No stridor. Normal respiratory effort.  Lungs clear.  Abdominal: No distention.  Right lower quadrant tenderness without rebound or guarding.  Musculoskeletal: Pain with attempted movement of proximal right lower extremity.  Tender to palpation of right hip and proximal right femur.  No tenderness to palpation of right ankle or foot.  Skin: Skin intact.  Psychiatric: Does not appear to respond to internal stimuli.  Neurologic: Alert. Follows directions. Face symmetric. No aphasia or dysarthria.  Strength 5/5 bilateral upper extremities and left lower extremity.  Right ankle dorsiflexion plantarflexion strength 5/5.   Sensation to light touch intact.    ED Course & Medical Decision Making   Triage vital signs notable for elevated blood pressure 163/72.  Exam notable for right lower quadrant abdominal and right hip and proximal right thigh pain especially with even minimal movement.  Right lower extremity neurovascularly intact.  Skin intact.  X-rays of right hip and pelvis and femur demonstrated right femoral neck fracture.  Given advanced age and dementia ordered CT imaging of head, cervical spine, chest abdomen pelvis to exclude additional injuries and cross-sectional imaging was reassuring with exception of right femoral neck fracture.  Discussed with orthopedic surgery who advised patient can be admitted to [facility] rather than transferred to [facility] [place] [place].  Discussed with hospitalist.  Fentanyl for pain and Zofran for nausea.    Diagnoses as of 12/26/24 1738   Closed fracture of neck of right femur, initial encounter            Arash Phelps MD  12/26/24 6059

## 2024-12-27 ENCOUNTER — APPOINTMENT (OUTPATIENT)
Dept: RADIOLOGY | Facility: HOSPITAL | Age: 84
DRG: 522 | End: 2024-12-27
Payer: MEDICARE

## 2024-12-27 ENCOUNTER — ANESTHESIA EVENT (OUTPATIENT)
Dept: OPERATING ROOM | Facility: HOSPITAL | Age: 84
End: 2024-12-27
Payer: MEDICARE

## 2024-12-27 ENCOUNTER — ANESTHESIA (OUTPATIENT)
Dept: OPERATING ROOM | Facility: HOSPITAL | Age: 84
End: 2024-12-27
Payer: MEDICARE

## 2024-12-27 ENCOUNTER — APPOINTMENT (OUTPATIENT)
Dept: CARDIOLOGY | Facility: HOSPITAL | Age: 84
DRG: 522 | End: 2024-12-27
Payer: MEDICARE

## 2024-12-27 LAB
ANION GAP SERPL CALC-SCNC: 9 MMOL/L (ref 10–20)
ATRIAL RATE: 70 BPM
ATRIAL RATE: 72 BPM
BUN SERPL-MCNC: 18 MG/DL (ref 6–23)
CALCIUM SERPL-MCNC: 9.3 MG/DL (ref 8.6–10.3)
CHLORIDE SERPL-SCNC: 105 MMOL/L (ref 98–107)
CO2 SERPL-SCNC: 31 MMOL/L (ref 21–32)
CREAT SERPL-MCNC: 0.88 MG/DL (ref 0.5–1.05)
EGFRCR SERPLBLD CKD-EPI 2021: 64 ML/MIN/1.73M*2
ERYTHROCYTE [DISTWIDTH] IN BLOOD BY AUTOMATED COUNT: 14 % (ref 11.5–14.5)
GLUCOSE SERPL-MCNC: 103 MG/DL (ref 74–99)
HCT VFR BLD AUTO: 39.8 % (ref 36–46)
HGB BLD-MCNC: 13.6 G/DL (ref 12–16)
MCH RBC QN AUTO: 30 PG (ref 26–34)
MCHC RBC AUTO-ENTMCNC: 34.2 G/DL (ref 32–36)
MCV RBC AUTO: 88 FL (ref 80–100)
NRBC BLD-RTO: 0 /100 WBCS (ref 0–0)
P AXIS: 12 DEGREES
P AXIS: 5 DEGREES
P OFFSET: 186 MS
P OFFSET: 188 MS
P ONSET: 144 MS
P ONSET: 147 MS
PLATELET # BLD AUTO: 156 X10*3/UL (ref 150–450)
POTASSIUM SERPL-SCNC: 3.7 MMOL/L (ref 3.5–5.3)
PR INTERVAL: 134 MS
PR INTERVAL: 140 MS
Q ONSET: 214 MS
Q ONSET: 214 MS
QRS COUNT: 12 BEATS
QRS COUNT: 12 BEATS
QRS DURATION: 80 MS
QRS DURATION: 82 MS
QT INTERVAL: 382 MS
QT INTERVAL: 384 MS
QTC CALCULATION(BAZETT): 414 MS
QTC CALCULATION(BAZETT): 418 MS
QTC FREDERICIA: 404 MS
QTC FREDERICIA: 406 MS
R AXIS: 122 DEGREES
R AXIS: 125 DEGREES
RBC # BLD AUTO: 4.53 X10*6/UL (ref 4–5.2)
SODIUM SERPL-SCNC: 141 MMOL/L (ref 136–145)
T AXIS: -9 DEGREES
T AXIS: 5 DEGREES
T OFFSET: 405 MS
T OFFSET: 406 MS
VENTRICULAR RATE: 70 BPM
VENTRICULAR RATE: 72 BPM
WBC # BLD AUTO: 8.6 X10*3/UL (ref 4.4–11.3)

## 2024-12-27 PROCEDURE — 36415 COLL VENOUS BLD VENIPUNCTURE: CPT | Performed by: INTERNAL MEDICINE

## 2024-12-27 PROCEDURE — 2500000004 HC RX 250 GENERAL PHARMACY W/ HCPCS (ALT 636 FOR OP/ED): Performed by: INTERNAL MEDICINE

## 2024-12-27 PROCEDURE — 7100000001 HC RECOVERY ROOM TIME - INITIAL BASE CHARGE: Performed by: STUDENT IN AN ORGANIZED HEALTH CARE EDUCATION/TRAINING PROGRAM

## 2024-12-27 PROCEDURE — 72170 X-RAY EXAM OF PELVIS: CPT

## 2024-12-27 PROCEDURE — 3600000010 HC OR TIME - EACH INCREMENTAL 1 MINUTE - PROCEDURE LEVEL FIVE: Performed by: STUDENT IN AN ORGANIZED HEALTH CARE EDUCATION/TRAINING PROGRAM

## 2024-12-27 PROCEDURE — 2500000004 HC RX 250 GENERAL PHARMACY W/ HCPCS (ALT 636 FOR OP/ED): Performed by: ANESTHESIOLOGIST ASSISTANT

## 2024-12-27 PROCEDURE — 2500000004 HC RX 250 GENERAL PHARMACY W/ HCPCS (ALT 636 FOR OP/ED): Performed by: STUDENT IN AN ORGANIZED HEALTH CARE EDUCATION/TRAINING PROGRAM

## 2024-12-27 PROCEDURE — 2780000003 HC OR 278 NO HCPCS: Performed by: STUDENT IN AN ORGANIZED HEALTH CARE EDUCATION/TRAINING PROGRAM

## 2024-12-27 PROCEDURE — 2500000001 HC RX 250 WO HCPCS SELF ADMINISTERED DRUGS (ALT 637 FOR MEDICARE OP): Performed by: INTERNAL MEDICINE

## 2024-12-27 PROCEDURE — 72170 X-RAY EXAM OF PELVIS: CPT | Performed by: RADIOLOGY

## 2024-12-27 PROCEDURE — 1200000002 HC GENERAL ROOM WITH TELEMETRY DAILY

## 2024-12-27 PROCEDURE — 2720000007 HC OR 272 NO HCPCS: Performed by: STUDENT IN AN ORGANIZED HEALTH CARE EDUCATION/TRAINING PROGRAM

## 2024-12-27 PROCEDURE — 27236 TREAT THIGH FRACTURE: CPT | Performed by: STUDENT IN AN ORGANIZED HEALTH CARE EDUCATION/TRAINING PROGRAM

## 2024-12-27 PROCEDURE — 82374 ASSAY BLOOD CARBON DIOXIDE: CPT | Performed by: INTERNAL MEDICINE

## 2024-12-27 PROCEDURE — 7100000002 HC RECOVERY ROOM TIME - EACH INCREMENTAL 1 MINUTE: Performed by: STUDENT IN AN ORGANIZED HEALTH CARE EDUCATION/TRAINING PROGRAM

## 2024-12-27 PROCEDURE — 3600000005 HC OR TIME - INITIAL BASE CHARGE - PROCEDURE LEVEL FIVE: Performed by: STUDENT IN AN ORGANIZED HEALTH CARE EDUCATION/TRAINING PROGRAM

## 2024-12-27 PROCEDURE — C1776 JOINT DEVICE (IMPLANTABLE): HCPCS | Performed by: STUDENT IN AN ORGANIZED HEALTH CARE EDUCATION/TRAINING PROGRAM

## 2024-12-27 PROCEDURE — 85027 COMPLETE CBC AUTOMATED: CPT | Performed by: INTERNAL MEDICINE

## 2024-12-27 PROCEDURE — 0SRR0J9 REPLACEMENT OF RIGHT HIP JOINT, FEMORAL SURFACE WITH SYNTHETIC SUBSTITUTE, CEMENTED, OPEN APPROACH: ICD-10-PCS | Performed by: STUDENT IN AN ORGANIZED HEALTH CARE EDUCATION/TRAINING PROGRAM

## 2024-12-27 PROCEDURE — 93005 ELECTROCARDIOGRAM TRACING: CPT

## 2024-12-27 PROCEDURE — 93010 ELECTROCARDIOGRAM REPORT: CPT | Performed by: INTERNAL MEDICINE

## 2024-12-27 PROCEDURE — 3700000002 HC GENERAL ANESTHESIA TIME - EACH INCREMENTAL 1 MINUTE: Performed by: STUDENT IN AN ORGANIZED HEALTH CARE EDUCATION/TRAINING PROGRAM

## 2024-12-27 PROCEDURE — 2500000005 HC RX 250 GENERAL PHARMACY W/O HCPCS: Performed by: ANESTHESIOLOGIST ASSISTANT

## 2024-12-27 PROCEDURE — 9420000001 HC RT PATIENT EDUCATION 5 MIN

## 2024-12-27 PROCEDURE — 2500000005 HC RX 250 GENERAL PHARMACY W/O HCPCS: Performed by: STUDENT IN AN ORGANIZED HEALTH CARE EDUCATION/TRAINING PROGRAM

## 2024-12-27 PROCEDURE — 99223 1ST HOSP IP/OBS HIGH 75: CPT | Performed by: INTERNAL MEDICINE

## 2024-12-27 PROCEDURE — 3700000001 HC GENERAL ANESTHESIA TIME - INITIAL BASE CHARGE: Performed by: STUDENT IN AN ORGANIZED HEALTH CARE EDUCATION/TRAINING PROGRAM

## 2024-12-27 DEVICE — IMPLANTABLE DEVICE: Type: IMPLANTABLE DEVICE | Site: HIP | Status: FUNCTIONAL

## 2024-12-27 DEVICE — MODULAR CATHCART FRACTURE HEAD HIP BALL 45MM OD +5MM: Type: IMPLANTABLE DEVICE | Site: HIP | Status: FUNCTIONAL

## 2024-12-27 DEVICE — TOBRA FULL DOSE ANTIBIOTIC BONE CEMENT, 10 PACK CATALOG NUMBER IS 6197-9-010
Type: IMPLANTABLE DEVICE | Site: HIP | Status: FUNCTIONAL
Brand: SIMPLEX

## 2024-12-27 DEVICE — MODULAR CATHCART TAPERED SPACER 12/14 TAPER +0MM: Type: IMPLANTABLE DEVICE | Site: HIP | Status: FUNCTIONAL

## 2024-12-27 RX ORDER — DOCUSATE SODIUM 100 MG/1
100 CAPSULE, LIQUID FILLED ORAL 2 TIMES DAILY
Status: DISCONTINUED | OUTPATIENT
Start: 2024-12-27 | End: 2024-12-30 | Stop reason: HOSPADM

## 2024-12-27 RX ORDER — PHENYLEPHRINE HCL IN 0.9% NACL 1 MG/10 ML
SYRINGE (ML) INTRAVENOUS AS NEEDED
Status: DISCONTINUED | OUTPATIENT
Start: 2024-12-27 | End: 2024-12-27

## 2024-12-27 RX ORDER — ONDANSETRON HYDROCHLORIDE 2 MG/ML
INJECTION, SOLUTION INTRAVENOUS AS NEEDED
Status: DISCONTINUED | OUTPATIENT
Start: 2024-12-27 | End: 2024-12-27

## 2024-12-27 RX ORDER — CEFAZOLIN 1 G/1
INJECTION, POWDER, FOR SOLUTION INTRAVENOUS AS NEEDED
Status: DISCONTINUED | OUTPATIENT
Start: 2024-12-27 | End: 2024-12-27

## 2024-12-27 RX ORDER — NALOXONE HYDROCHLORIDE 1 MG/ML
0.2 INJECTION INTRAMUSCULAR; INTRAVENOUS; SUBCUTANEOUS EVERY 5 MIN PRN
Status: DISCONTINUED | OUTPATIENT
Start: 2024-12-27 | End: 2024-12-30 | Stop reason: HOSPADM

## 2024-12-27 RX ORDER — PANTOPRAZOLE SODIUM 40 MG/1
40 TABLET, DELAYED RELEASE ORAL
Status: DISCONTINUED | OUTPATIENT
Start: 2024-12-28 | End: 2024-12-30 | Stop reason: HOSPADM

## 2024-12-27 RX ORDER — CEFAZOLIN SODIUM 2 G/100ML
2 INJECTION, SOLUTION INTRAVENOUS EVERY 8 HOURS
Status: COMPLETED | OUTPATIENT
Start: 2024-12-28 | End: 2024-12-28

## 2024-12-27 RX ORDER — ONDANSETRON 4 MG/1
4 TABLET, FILM COATED ORAL EVERY 8 HOURS PRN
Status: DISCONTINUED | OUTPATIENT
Start: 2024-12-27 | End: 2024-12-30 | Stop reason: HOSPADM

## 2024-12-27 RX ORDER — SODIUM CHLORIDE 0.9 G/100ML
IRRIGANT IRRIGATION AS NEEDED
Status: DISCONTINUED | OUTPATIENT
Start: 2024-12-27 | End: 2024-12-27 | Stop reason: HOSPADM

## 2024-12-27 RX ORDER — SODIUM CHLORIDE, SODIUM LACTATE, POTASSIUM CHLORIDE, CALCIUM CHLORIDE 600; 310; 30; 20 MG/100ML; MG/100ML; MG/100ML; MG/100ML
INJECTION, SOLUTION INTRAVENOUS CONTINUOUS PRN
Status: DISCONTINUED | OUTPATIENT
Start: 2024-12-27 | End: 2024-12-27

## 2024-12-27 RX ORDER — KETOROLAC TROMETHAMINE 30 MG/ML
INJECTION, SOLUTION INTRAMUSCULAR; INTRAVENOUS AS NEEDED
Status: DISCONTINUED | OUTPATIENT
Start: 2024-12-27 | End: 2024-12-27

## 2024-12-27 RX ORDER — ONDANSETRON HYDROCHLORIDE 2 MG/ML
4 INJECTION, SOLUTION INTRAVENOUS EVERY 8 HOURS PRN
Status: DISCONTINUED | OUTPATIENT
Start: 2024-12-27 | End: 2024-12-30 | Stop reason: HOSPADM

## 2024-12-27 RX ORDER — PROPOFOL 10 MG/ML
INJECTION, EMULSION INTRAVENOUS AS NEEDED
Status: DISCONTINUED | OUTPATIENT
Start: 2024-12-27 | End: 2024-12-27

## 2024-12-27 RX ORDER — OXYCODONE HYDROCHLORIDE 5 MG/1
10 TABLET ORAL EVERY 4 HOURS PRN
Status: DISCONTINUED | OUTPATIENT
Start: 2024-12-27 | End: 2024-12-30 | Stop reason: HOSPADM

## 2024-12-27 RX ORDER — BISACODYL 5 MG
10 TABLET, DELAYED RELEASE (ENTERIC COATED) ORAL DAILY PRN
Status: DISCONTINUED | OUTPATIENT
Start: 2024-12-27 | End: 2024-12-30 | Stop reason: HOSPADM

## 2024-12-27 RX ORDER — ASPIRIN 81 MG/1
81 TABLET ORAL 2 TIMES DAILY
Status: DISCONTINUED | OUTPATIENT
Start: 2024-12-28 | End: 2024-12-30 | Stop reason: HOSPADM

## 2024-12-27 RX ORDER — ROPIVACAINE/EPI/CLONIDINE/KET 2.46-0.005
SYRINGE (ML) INJECTION AS NEEDED
Status: DISCONTINUED | OUTPATIENT
Start: 2024-12-27 | End: 2024-12-27 | Stop reason: HOSPADM

## 2024-12-27 RX ORDER — BISACODYL 10 MG/1
10 SUPPOSITORY RECTAL DAILY PRN
Status: DISCONTINUED | OUTPATIENT
Start: 2024-12-27 | End: 2024-12-30 | Stop reason: HOSPADM

## 2024-12-27 RX ORDER — TRANEXAMIC ACID 100 MG/ML
INJECTION, SOLUTION INTRAVENOUS AS NEEDED
Status: DISCONTINUED | OUTPATIENT
Start: 2024-12-27 | End: 2024-12-27

## 2024-12-27 RX ORDER — FENTANYL CITRATE 50 UG/ML
INJECTION, SOLUTION INTRAMUSCULAR; INTRAVENOUS AS NEEDED
Status: DISCONTINUED | OUTPATIENT
Start: 2024-12-27 | End: 2024-12-27

## 2024-12-27 RX ORDER — LIDOCAINE HYDROCHLORIDE 20 MG/ML
INJECTION, SOLUTION EPIDURAL; INFILTRATION; INTRACAUDAL; PERINEURAL AS NEEDED
Status: DISCONTINUED | OUTPATIENT
Start: 2024-12-27 | End: 2024-12-27

## 2024-12-27 RX ORDER — ACETAMINOPHEN 325 MG/1
650 TABLET ORAL EVERY 4 HOURS PRN
Status: DISCONTINUED | OUTPATIENT
Start: 2024-12-27 | End: 2024-12-30 | Stop reason: HOSPADM

## 2024-12-27 RX ORDER — MIDAZOLAM HYDROCHLORIDE 1 MG/ML
INJECTION INTRAMUSCULAR; INTRAVENOUS AS NEEDED
Status: DISCONTINUED | OUTPATIENT
Start: 2024-12-27 | End: 2024-12-27

## 2024-12-27 RX ORDER — ASPIRIN 81 MG/1
81 TABLET ORAL 2 TIMES DAILY
Qty: 60 TABLET | Refills: 0 | Status: SHIPPED | OUTPATIENT
Start: 2024-12-27

## 2024-12-27 RX ORDER — CYCLOBENZAPRINE HCL 5 MG
5 TABLET ORAL 3 TIMES DAILY PRN
Status: DISCONTINUED | OUTPATIENT
Start: 2024-12-27 | End: 2024-12-30 | Stop reason: HOSPADM

## 2024-12-27 RX ORDER — POLYETHYLENE GLYCOL 3350 17 G/17G
17 POWDER, FOR SOLUTION ORAL DAILY
Status: DISCONTINUED | OUTPATIENT
Start: 2024-12-27 | End: 2024-12-30 | Stop reason: HOSPADM

## 2024-12-27 RX ORDER — VANCOMYCIN HYDROCHLORIDE 1 G/20ML
INJECTION, POWDER, LYOPHILIZED, FOR SOLUTION INTRAVENOUS AS NEEDED
Status: DISCONTINUED | OUTPATIENT
Start: 2024-12-27 | End: 2024-12-27 | Stop reason: HOSPADM

## 2024-12-27 RX ORDER — ROCURONIUM BROMIDE 10 MG/ML
INJECTION, SOLUTION INTRAVENOUS AS NEEDED
Status: DISCONTINUED | OUTPATIENT
Start: 2024-12-27 | End: 2024-12-27

## 2024-12-27 RX ORDER — OXYCODONE HYDROCHLORIDE 5 MG/1
5 TABLET ORAL EVERY 6 HOURS PRN
Status: DISCONTINUED | OUTPATIENT
Start: 2024-12-27 | End: 2024-12-30 | Stop reason: HOSPADM

## 2024-12-27 RX ADMIN — HYDROMORPHONE HYDROCHLORIDE 0.2 MG: 0.2 INJECTION, SOLUTION INTRAMUSCULAR; INTRAVENOUS; SUBCUTANEOUS at 06:05

## 2024-12-27 RX ADMIN — LOSARTAN POTASSIUM 25 MG: 25 TABLET, FILM COATED ORAL at 09:28

## 2024-12-27 RX ADMIN — HYDROMORPHONE HYDROCHLORIDE 0.2 MG: 0.2 INJECTION, SOLUTION INTRAMUSCULAR; INTRAVENOUS; SUBCUTANEOUS at 01:05

## 2024-12-27 RX ADMIN — ASPIRIN 81 MG: 81 TABLET, COATED ORAL at 14:07

## 2024-12-27 RX ADMIN — Medication 2 L/MIN: at 21:30

## 2024-12-27 RX ADMIN — HYDROMORPHONE HYDROCHLORIDE 0.2 MG: 0.2 INJECTION, SOLUTION INTRAMUSCULAR; INTRAVENOUS; SUBCUTANEOUS at 11:51

## 2024-12-27 SDOH — HEALTH STABILITY: MENTAL HEALTH: HOW MANY DRINKS CONTAINING ALCOHOL DO YOU HAVE ON A TYPICAL DAY WHEN YOU ARE DRINKING?: PATIENT DOES NOT DRINK

## 2024-12-27 SDOH — ECONOMIC STABILITY: FOOD INSECURITY: WITHIN THE PAST 12 MONTHS, YOU WORRIED THAT YOUR FOOD WOULD RUN OUT BEFORE YOU GOT MONEY TO BUY MORE.: NEVER TRUE

## 2024-12-27 SDOH — ECONOMIC STABILITY: FOOD INSECURITY: WITHIN THE PAST 12 MONTHS, THE FOOD YOU BOUGHT JUST DIDN'T LAST AND YOU DIDN'T HAVE MONEY TO GET MORE.: NEVER TRUE

## 2024-12-27 SDOH — HEALTH STABILITY: MENTAL HEALTH: HOW OFTEN DO YOU HAVE SIX OR MORE DRINKS ON ONE OCCASION?: NEVER

## 2024-12-27 SDOH — SOCIAL STABILITY: SOCIAL INSECURITY: ARE YOU MARRIED, WIDOWED, DIVORCED, SEPARATED, NEVER MARRIED, OR LIVING WITH A PARTNER?: WIDOWED

## 2024-12-27 SDOH — ECONOMIC STABILITY: HOUSING INSECURITY: IN THE PAST 12 MONTHS, HOW MANY TIMES HAVE YOU MOVED WHERE YOU WERE LIVING?: 0

## 2024-12-27 SDOH — ECONOMIC STABILITY: INCOME INSECURITY: IN THE LAST 12 MONTHS, WAS THERE A TIME WHEN YOU WERE NOT ABLE TO PAY THE MORTGAGE OR RENT ON TIME?: NO

## 2024-12-27 SDOH — HEALTH STABILITY: MENTAL HEALTH: HOW OFTEN DO YOU HAVE A DRINK CONTAINING ALCOHOL?: NEVER

## 2024-12-27 SDOH — ECONOMIC STABILITY: HOUSING INSECURITY: AT ANY TIME IN THE PAST 12 MONTHS, WERE YOU HOMELESS OR LIVING IN A SHELTER (INCLUDING NOW)?: NO

## 2024-12-27 SDOH — ECONOMIC STABILITY: GENERAL

## 2024-12-27 SDOH — ECONOMIC STABILITY: HOUSING INSECURITY

## 2024-12-27 SDOH — ECONOMIC STABILITY: TRANSPORTATION INSECURITY: IN THE PAST 12 MONTHS, HAS LACK OF TRANSPORTATION KEPT YOU FROM MEDICAL APPOINTMENTS OR FROM GETTING MEDICATIONS?: NO

## 2024-12-27 SDOH — HEALTH STABILITY: MENTAL HEALTH: CURRENT SMOKER: 0

## 2024-12-27 SDOH — ECONOMIC STABILITY: HOUSING INSECURITY: IN THE PAST 12 MONTHS HAS THE ELECTRIC, GAS, OIL, OR WATER COMPANY THREATENED TO SHUT OFF SERVICES IN YOUR HOME?: NO

## 2024-12-27 SDOH — ECONOMIC STABILITY: FOOD INSECURITY: WITHIN THE PAST 12 MONTHS, YOU WORRIED THAT YOUR FOOD WOULD RUN OUT BEFORE YOU GOT THE MONEY TO BUY MORE.: NEVER TRUE

## 2024-12-27 SDOH — ECONOMIC STABILITY: FOOD INSECURITY

## 2024-12-27 SDOH — ECONOMIC STABILITY: HOUSING INSECURITY: IN THE LAST 12 MONTHS, WAS THERE A TIME WHEN YOU WERE NOT ABLE TO PAY THE MORTGAGE OR RENT ON TIME?: NO

## 2024-12-27 SDOH — ECONOMIC STABILITY: INCOME INSECURITY: IN THE PAST 12 MONTHS HAS THE ELECTRIC, GAS, OIL, OR WATER COMPANY THREATENED TO SHUT OFF SERVICES IN YOUR HOME?: NO

## 2024-12-27 SDOH — ECONOMIC STABILITY: TRANSPORTATION INSECURITY

## 2024-12-27 SDOH — ECONOMIC STABILITY: HOUSING INSECURITY: IN THE LAST 12 MONTHS, HOW MANY PLACES HAVE YOU LIVED?: 2

## 2024-12-27 SDOH — ECONOMIC STABILITY: FOOD INSECURITY: HOW HARD IS IT FOR YOU TO PAY FOR THE VERY BASICS LIKE FOOD, HOUSING, MEDICAL CARE, AND HEATING?: NOT HARD AT ALL

## 2024-12-27 ASSESSMENT — ENCOUNTER SYMPTOMS
ARTHRALGIAS: 1
DIAPHORESIS: 0
PALPITATIONS: 0
DIFFICULTY URINATING: 0
FACIAL ASYMMETRY: 0
HEADACHES: 0
CHEST TIGHTNESS: 0
CHILLS: 0
ABDOMINAL PAIN: 0
ACTIVITY CHANGE: 1
ABDOMINAL DISTENTION: 0
DIZZINESS: 0
FATIGUE: 0
MYALGIAS: 1
APPETITE CHANGE: 0
ANAL BLEEDING: 0
LIGHT-HEADEDNESS: 0
NUMBNESS: 0
AGITATION: 0
APNEA: 0

## 2024-12-27 ASSESSMENT — ACTIVITIES OF DAILY LIVING (ADL)
HOW_WELL_CAN_YOU_USE_BATHROOM_BY_YOURSELF: INDEPENDENTLY
BATHING: INDEPENDENT
HOW_WELL_CAN_YOU_FEED_YOURSELF: INDEPENDENTLY
HOW_WELL_CAN_YOU_DRESS_YOURSELF: INDEPENDENTLY
ASSISTIVE_DEVICES: HEARING AID - RIGHT
ADL_BEFORE_ADMISSION: INDEPENDENTLY
LACK_OF_TRANSPORTATION: NO
ASSISTIVE_DEVICES: HEARING AID - LEFT
LACK_OF_TRANSPORTATION: NO
ADL_BEFORE_ADMISSION: RIGHT
DRESSING: INDEPENDENT
TOILETING: INDEPENDENT
HOW_WELL_CAN_YOU_BATHE_YOURSELF: INDEPENDENTLY
ADEQUATE_TO_COMPLETE_ADL: YES
ASSISTIVE_DEVICES: SHOWER CHAIR
WALKS_IN_HOME: INDEPENDENTLY
HEARING_RIGHT_EAR: I USE A HEARING AID
HEARING_LEFT_EAR: I USE A HEARING AID
ADEQUATE_TO_COMPLETE_ADL: YES
HOW_WELL_CAN_YOU_COMPLETE_GROOMING_TASKS: INDEPENDENTLY
FEEDING: INDEPENDENT

## 2024-12-27 ASSESSMENT — PAIN SCALES - GENERAL
PAINLEVEL_OUTOF10: 0 - NO PAIN
PAINLEVEL_OUTOF10: 8
PAINLEVEL_OUTOF10: 10 - WORST POSSIBLE PAIN
PAINLEVEL_OUTOF10: 0 - NO PAIN
PAINLEVEL_OUTOF10: 2
PAINLEVEL_OUTOF10: 0 - NO PAIN
PAINLEVEL_OUTOF10: 0 - NO PAIN
PAINLEVEL_OUTOF10: 7
PAINLEVEL_OUTOF10: 0 - NO PAIN
PAINLEVEL_OUTOF10: 8
PAINLEVEL_OUTOF10: 0 - NO PAIN

## 2024-12-27 ASSESSMENT — COGNITIVE AND FUNCTIONAL STATUS - GENERAL
DRESSING REGULAR LOWER BODY CLOTHING: TOTAL
WALKING IN HOSPITAL ROOM: TOTAL
STANDING UP FROM CHAIR USING ARMS: TOTAL
TOILETING: A LOT
TURNING FROM BACK TO SIDE WHILE IN FLAT BAD: A LOT
CLIMB 3 TO 5 STEPS WITH RAILING: TOTAL
MOBILITY SCORE: 9
DAILY ACTIVITIY SCORE: 15
DRESSING REGULAR UPPER BODY CLOTHING: A LOT
MOVING FROM LYING ON BACK TO SITTING ON SIDE OF FLAT BED WITH BEDRAILS: A LITTLE
MOVING TO AND FROM BED TO CHAIR: TOTAL
HELP NEEDED FOR BATHING: A LOT

## 2024-12-27 ASSESSMENT — LIFESTYLE VARIABLES
AUDIT-C TOTAL SCORE: 0
SKIP TO QUESTIONS 9-10: 1

## 2024-12-27 ASSESSMENT — PAIN DESCRIPTION - LOCATION: LOCATION: HIP

## 2024-12-27 ASSESSMENT — SOCIAL DETERMINANTS OF HEALTH (SDOH): IN THE PAST 12 MONTHS, HAS THE ELECTRIC, GAS, OIL, OR WATER COMPANY THREATENED TO SHUT OFF SERVICE IN YOUR HOME?: NO

## 2024-12-27 ASSESSMENT — PAIN DESCRIPTION - DESCRIPTORS: DESCRIPTORS: ACHING;SHARP

## 2024-12-27 ASSESSMENT — PAIN DESCRIPTION - ORIENTATION
ORIENTATION: RIGHT
ORIENTATION: RIGHT

## 2024-12-27 NOTE — PROGRESS NOTES
12/27/24 1235   Discharge Planning   Living Arrangements Alone   Support Systems Children   Assistance Needed none   Type of Residence Assisted living   Do you have animals or pets at home? No   Care Facility Name [facility]   Who is requesting discharge planning? Provider   Home or Post Acute Services Post acute facilities (Rehab/SNF/etc)   Type of Post Acute Facility Services Skilled nursing   Expected Discharge Disposition SNF   Does the patient need discharge transport arranged? Yes   RoundTrip coordination needed? Yes   Has discharge transport been arranged? No   Financial Resource Strain   How hard is it for you to pay for the very basics like food, housing, medical care, and heating? Not hard   Housing Stability   In the last 12 months, was there a time when you were not able to pay the mortgage or rent on time? N   In the past 12 months, how many times have you moved where you were living? 0   At any time in the past 12 months, were you homeless or living in a shelter (including now)? N   Transportation Needs   In the past 12 months, has lack of transportation kept you from medical appointments or from getting medications? no   In the past 12 months, has lack of transportation kept you from meetings, work, or from getting things needed for daily living? No   Patient Choice   Provider Choice list and CMS website (https://medicare.gov/care-compare#search) for post-acute Quality and Resource Measure Data were provided and reviewed with: Family   Patient / Family choosing to utilize agency / facility established prior to hospitalization No   Stroke Family Assessment   Stroke Family Assessment Needed No   Intensity of Service   Intensity of Service 0-30 min     12/27/24 1236  Met with patient at bedside to discuss discharge planning.  Patient lives at [facility].  She states that she is independent with ADLs.  She does not use any assistive devices for ambulation.  She has transportation  for appointments.  Plan for surgery today.  Will need PT/OT evals post op.  Anticipate SNF at discharge.  [facility] SNF list left at bedside.  Shantel Fletcher RN [facility]

## 2024-12-27 NOTE — CARE PLAN
The patient's goals for the shift include      The clinical goals for the shift include pain managed through shift    Problem: Pain - Adult  Goal: Verbalizes/displays adequate comfort level or baseline comfort level  Outcome: Progressing     Problem: Safety - Adult  Goal: Free from fall injury  Outcome: Progressing     Problem: Discharge Planning  Goal: Discharge to home or other facility with appropriate resources  Outcome: Progressing     Problem: Chronic Conditions and Co-morbidities  Goal: Patient's chronic conditions and co-morbidity symptoms are monitored and maintained or improved  Outcome: Progressing

## 2024-12-27 NOTE — H&P
"History Of Present Illness  Kimberly Randle \"Sunitha\" is a 84 y.o. female presenting with fall and right hip pain,.  84-year-old female, who is known to have mild dementia, also known to history of hypertension, coronary artery disease, history of CABG in the past, sees Dr. Barrett regularly, she has seen him in November 2024, stable and conservative management, also noted history of hyperlipidemia, came here after a mechanical fall, diagnosed with right femur neck fracture, admitted here for further management.  She has right hip pain.  She denies passing out or chest pain or shortness of breath or dizziness.  .     Past Medical History  She has a past medical history of Personal history of other diseases of the circulatory system, Personal history of other diseases of the circulatory system, Personal history of other diseases of the circulatory system (12/06/2018), Personal history of other endocrine, nutritional and metabolic disease, Personal history of other specified conditions, and Personal history of transient ischemic attack (TIA), and cerebral infarction without residual deficits.    Surgical History  She has a past surgical history that includes Coronary artery bypass graft (04/04/2018); Other surgical history (04/27/2018); Other surgical history (12/03/2020); and Other surgical history (12/03/2020).     Social History  She reports that she has quit smoking. Her smoking use included cigarettes. She has been exposed to tobacco smoke. She has never used smokeless tobacco. She reports that she does not currently use alcohol. She reports that she does not use drugs.    Family History  Family History   Problem Relation Name Age of Onset    Heart failure Mother      Cancer Father          Allergies  Atorvastatin, Donepezil, Amoxicillin, and Codeine    Review of Systems   Constitutional:  Positive for activity change. Negative for appetite change, chills, diaphoresis and fatigue.   HENT:  Negative for congestion " "and dental problem.    Respiratory:  Negative for apnea and chest tightness.    Cardiovascular:  Negative for chest pain, palpitations and leg swelling.   Gastrointestinal:  Negative for abdominal distention, abdominal pain and anal bleeding.   Genitourinary:  Negative for difficulty urinating.   Musculoskeletal:  Positive for arthralgias, gait problem and myalgias.   Neurological:  Negative for dizziness, facial asymmetry, light-headedness, numbness and headaches.   Psychiatric/Behavioral:  Negative for agitation and behavioral problems.         Physical Exam  Constitutional:       Appearance: Normal appearance.   HENT:      Head: Normocephalic and atraumatic.   Eyes:      Pupils: Pupils are equal, round, and reactive to light.   Cardiovascular:      Rate and Rhythm: Normal rate and regular rhythm.      Heart sounds: No murmur heard.     No friction rub.   Pulmonary:      Effort: No respiratory distress.      Breath sounds: No stridor.   Abdominal:      General: Abdomen is flat.      Palpations: Abdomen is soft.   Musculoskeletal:         General: Tenderness and deformity present.      Right lower leg: No edema.      Left lower leg: No edema.   Neurological:      General: No focal deficit present.      Mental Status: She is alert and oriented to person, place, and time.      Motor: No weakness.   Psychiatric:         Mood and Affect: Mood normal.          Last Recorded Vitals  Blood pressure 147/66, pulse 67, temperature 36.2 °C (97.2 °F), temperature source Temporal, resp. rate 18, height 1.626 m (5' 4\"), weight 49.9 kg (110 lb), SpO2 94%.    Relevant Results  Results for orders placed or performed during the hospital encounter of 12/26/24 (from the past 96 hours)   CBC and Auto Differential   Result Value Ref Range    WBC 6.8 4.4 - 11.3 x10*3/uL    nRBC 0.0 0.0 - 0.0 /100 WBCs    RBC 4.37 4.00 - 5.20 x10*6/uL    Hemoglobin 13.2 12.0 - 16.0 g/dL    Hematocrit 39.9 36.0 - 46.0 %    MCV 91 80 - 100 fL    MCH 30.2 " 26.0 - 34.0 pg    MCHC 33.1 32.0 - 36.0 g/dL    RDW 14.0 11.5 - 14.5 %    Platelets 165 150 - 450 x10*3/uL    Neutrophils % 65.6 40.0 - 80.0 %    Immature Granulocytes %, Automated 0.4 0.0 - 0.9 %    Lymphocytes % 25.2 13.0 - 44.0 %    Monocytes % 7.4 2.0 - 10.0 %    Eosinophils % 1.0 0.0 - 6.0 %    Basophils % 0.4 0.0 - 2.0 %    Neutrophils Absolute 4.42 1.60 - 5.50 x10*3/uL    Immature Granulocytes Absolute, Automated 0.03 0.00 - 0.50 x10*3/uL    Lymphocytes Absolute 1.70 0.80 - 3.00 x10*3/uL    Monocytes Absolute 0.50 0.05 - 0.80 x10*3/uL    Eosinophils Absolute 0.07 0.00 - 0.40 x10*3/uL    Basophils Absolute 0.03 0.00 - 0.10 x10*3/uL   Comprehensive metabolic panel   Result Value Ref Range    Glucose 114 (H) 74 - 99 mg/dL    Sodium 142 136 - 145 mmol/L    Potassium 3.8 3.5 - 5.3 mmol/L    Chloride 108 (H) 98 - 107 mmol/L    Bicarbonate 25 21 - 32 mmol/L    Anion Gap 13 10 - 20 mmol/L    Urea Nitrogen 23 6 - 23 mg/dL    Creatinine 1.01 0.50 - 1.05 mg/dL    eGFR 54 (L) >60 mL/min/1.73m*2    Calcium 8.8 8.6 - 10.3 mg/dL    Albumin 3.8 3.4 - 5.0 g/dL    Alkaline Phosphatase 80 33 - 136 U/L    Total Protein 6.0 (L) 6.4 - 8.2 g/dL    AST 25 9 - 39 U/L    Bilirubin, Total 0.5 0.0 - 1.2 mg/dL    ALT 21 7 - 45 U/L   Protime-INR   Result Value Ref Range    Protime 10.6 9.8 - 12.8 seconds    INR 0.9 0.9 - 1.1   aPTT   Result Value Ref Range    aPTT 30 27 - 38 seconds   Type And Screen   Result Value Ref Range    ABO TYPE O     Rh TYPE POS     ANTIBODY SCREEN NEG    Green Top   Result Value Ref Range    Extra Tube Hold for add-ons.    SST TOP   Result Value Ref Range    Extra Tube Hold for add-ons.    CBC   Result Value Ref Range    WBC 8.6 4.4 - 11.3 x10*3/uL    nRBC 0.0 0.0 - 0.0 /100 WBCs    RBC 4.53 4.00 - 5.20 x10*6/uL    Hemoglobin 13.6 12.0 - 16.0 g/dL    Hematocrit 39.8 36.0 - 46.0 %    MCV 88 80 - 100 fL    MCH 30.0 26.0 - 34.0 pg    MCHC 34.2 32.0 - 36.0 g/dL    RDW 14.0 11.5 - 14.5 %    Platelets 156 150 - 450  x10*3/uL   Basic metabolic panel   Result Value Ref Range    Glucose 103 (H) 74 - 99 mg/dL    Sodium 141 136 - 145 mmol/L    Potassium 3.7 3.5 - 5.3 mmol/L    Chloride 105 98 - 107 mmol/L    Bicarbonate 31 21 - 32 mmol/L    Anion Gap 9 (L) 10 - 20 mmol/L    Urea Nitrogen 18 6 - 23 mg/dL    Creatinine 0.88 0.50 - 1.05 mg/dL    eGFR 64 >60 mL/min/1.73m*2    Calcium 9.3 8.6 - 10.3 mg/dL      CT head wo IV contrast    Result Date: 12/26/2024  Interpreted By:  Ariel Flores, STUDY: CT HEAD WO IV CONTRAST; CT CERVICAL SPINE WO IV CONTRAST;  12/26/2024 4:32 pm   INDICATION: Signs/Symptoms:Ground-level fall right abdominal hip pain tenderness. X-ray right femoral neck fracture.; Signs/Symptoms:Ground-level fall right abdominal hip pain tenderness. X-ray right femoral neck fracture.. Altered mental status   COMPARISON: CT cervical spine 09/01/2021.   ACCESSION NUMBER(S): [id number]; [id number]   ORDERING CLINICIAN: CELESTE JOSHI   TECHNIQUE: Axial noncontrast CT images of the head and cervical spine.   FINDINGS: BRAIN PARENCHYMA: Gray-white matter interfaces are preserved. No mass, mass effect or midline shift. Confluent periventricular and deep white matter hypodensities suggestive of moderate chronic microvascular ischemic change.   HEMORRHAGE: No acute intracranial hemorrhage. VENTRICLES and EXTRA-AXIAL SPACES: No hydrocephalus. No extra-axial collections. Generalized cerebral volume loss and associated ventricular and sulcal enlargement. EXTRACRANIAL SOFT TISSUES: CALVARIUM: No depressed calvarial fracture.     SOFT TISSUES: Within normal limits. PARANASAL SINUSES: No hemorrhage in the paranasal sinuses. MASTOIDS: Within normal limits. ORBITS: Grossly normal.   ALIGNMENT: Normal cervical lordosis. Mild degenerative anterolisthesis of C3 over C4 and C7 over T1. VERTEBRAE: No acute fracture. Vertebral body heights are maintained. There is unchanged superior endplate Schmorl node at T4. There are no suspicious osseous  lesions. Degenerative disc space narrowing at C4 through C7 with posterolateral osseous spurring contributing to mild to moderate bilateral foraminal stenosis at C4-C5 and C5-C6. SPINAL CANAL: No critical spinal canal stenosis. PREVERTEBRAL SOFT TISSUES: No prevertebral soft tissue swelling. LUNG APICES: Imaged portion of the lung apices are within normal limits.   OTHER FINDINGS: None.         No acute cortical infarct or acute intracranial hemorrhage. Moderate chronic microvascular ischemic change.   No evidence of cervical spine fracture or acute traumatic malalignment.   Signed by: Ariel Flores 12/26/2024 6:06 PM Dictation workstation:   [id number]    CT cervical spine wo IV contrast    Result Date: 12/26/2024  Interpreted By:  Ariel Flores, STUDY: CT HEAD WO IV CONTRAST; CT CERVICAL SPINE WO IV CONTRAST;  12/26/2024 4:32 pm   INDICATION: Signs/Symptoms:Ground-level fall right abdominal hip pain tenderness. X-ray right femoral neck fracture.; Signs/Symptoms:Ground-level fall right abdominal hip pain tenderness. X-ray right femoral neck fracture.. Altered mental status   COMPARISON: CT cervical spine 09/01/2021.   ACCESSION NUMBER(S): [id number]; [id number]   ORDERING CLINICIAN: CELESTE JOSHI   TECHNIQUE: Axial noncontrast CT images of the head and cervical spine.   FINDINGS: BRAIN PARENCHYMA: Gray-white matter interfaces are preserved. No mass, mass effect or midline shift. Confluent periventricular and deep white matter hypodensities suggestive of moderate chronic microvascular ischemic change.   HEMORRHAGE: No acute intracranial hemorrhage. VENTRICLES and EXTRA-AXIAL SPACES: No hydrocephalus. No extra-axial collections. Generalized cerebral volume loss and associated ventricular and sulcal enlargement. EXTRACRANIAL SOFT TISSUES: CALVARIUM: No depressed calvarial fracture.     SOFT TISSUES: Within normal limits. PARANASAL SINUSES: No hemorrhage in the paranasal sinuses. MASTOIDS: Within normal limits.  ORBITS: Grossly normal.   ALIGNMENT: Normal cervical lordosis. Mild degenerative anterolisthesis of C3 over C4 and C7 over T1. VERTEBRAE: No acute fracture. Vertebral body heights are maintained. There is unchanged superior endplate Schmorl node at T4. There are no suspicious osseous lesions. Degenerative disc space narrowing at C4 through C7 with posterolateral osseous spurring contributing to mild to moderate bilateral foraminal stenosis at C4-C5 and C5-C6. SPINAL CANAL: No critical spinal canal stenosis. PREVERTEBRAL SOFT TISSUES: No prevertebral soft tissue swelling. LUNG APICES: Imaged portion of the lung apices are within normal limits.   OTHER FINDINGS: None.         No acute cortical infarct or acute intracranial hemorrhage. Moderate chronic microvascular ischemic change.   No evidence of cervical spine fracture or acute traumatic malalignment.   Signed by: Ariel Flores 12/26/2024 6:06 PM Dictation workstation:   [id number]    CT chest abdomen pelvis w IV contrast    Result Date: 12/26/2024  STUDY: CT Chest, Abdomen, and Pelvis with IV Contrast; 12/26/2024, 4:32 PM INDICATION: Signs/Symptoms:Ground-level fall right abdominal hip pain tenderness. X-ray right femoral neck fracture. COMPARISON: None Available. ACCESSION NUMBER(S): [id number] ORDERING CLINICIAN: CELESTE JOSHI TECHNIQUE: CT of the chest, abdomen, and pelvis was performed.  Contiguous axial images were obtained at 3 mm slice thickness through the chest, abdomen, and pelvis.  Coronal and sagittal reconstructions at 3 mm slice thickness were performed.  Omnipaque 350, 90 mL was administered intravenously.  FINDINGS: CHEST: Partially visualized chest showing the heart to be of normal size. There is no pericardial effusion.  Coronary artery calcifications are identified.  Visualized portions of the thoracic aorta without aneurysm or dissection.  Subsegmental atelectasis and mild dependent edematous changes posterior costophrenic angles, LEFT  greater than RIGHT.  Abdomen/pelvis: Stomach: No acute abnormality of the stomach is identified. Liver: The liver of normal size and contour. No intrahepatic ductal dilatation.. There are multiple hepatic hemangiomas. Gallbladder of normal appearance. Common bile duct grossly normal.  No findings to suggest choledocholithiasis.. Pancreas: Normal in size and contour, without masses or ductal dilation. Spleen: Normal in size and attenuation, without focal lesions. Adrenal Glands: Normal appearance bilaterally. Lymph Nodes: No significant lymphadenopathy in the abdomen or pelvis. Kidneys: Normal size, shape, and attenuation. No hydronephrosis, masses, or stones identified. Bowel: No evidence of obstruction, masses. There are a few scattered rectosigmoid diverticula.  Peritoneum: No free fluid or free air. Appendix: There is a normal-appearing appendix. Pelvis:No free fluid or free air within the pelvis.  No pelvic adenopathy.  Urinary bladder of normal appearance. Vasculature: Abdominal aorta and its major branches appear normal. Bones and Soft Tissues: There is a mildly displaced as well as angulated RIGHT femoral neck fracture.  No dislocation. Thoracolumbar spondylotic changes and facet arthrosis.  No findings of fracture.  No listhesis.  No acute process of the posterior elements is identified.  No sacral fracture.  No rib fractures identified.  No fractures of the shoulders are identified.    Impression: Mildly displaced and angulated RIGHT femoral neck fracture.  No dislocation.  No additional fracture.  Spondylotic changes and facet arthrosis of the thoracolumbar spine.  No acute process of the spine is identified. Mild dependent edematous changes and subsegmental atelectasis LEFT costophrenic angle.  Cannot exclude small area of pneumonia. Correlate clinically.  No additional findings to suggest an acute cardiopulmonary process. No acute abdominal or pelvic process.  Note made of scattered diverticulum.  No  findings of diverticulitis. Signed by Celeste Strickland MD    XR hip right with pelvis when performed 2 or 3 views    Result Date: 12/26/2024  Interpreted By:  Karen Cruz, STUDY: XR HIP RIGHT WITH PELVIS WHEN PERFORMED 2 OR 3 VIEWS; XR TIBIA FIBULA RIGHT 2 VIEWS; XR FEMUR RIGHT 2+ VIEWS;  12/26/2024 3:31 pm   INDICATION: Signs/Symptoms:Level fall suspected right hip fracture; Signs/Symptoms:Ground-level fall suspected right hip fracture.   COMPARISON: None.   ACCESSION NUMBER(S): [id number]; [id number]; [id number]   ORDERING CLINICIAN: CELESTE JOSHI   FINDINGS: Previous right hip, four views right femur, two views right tibia and fibula: There is an impacted fracture of the right femoral neck. There is a 2.2 cm impaction. There is an associated varus deformity.   Views of the right tibia and fibula show no fracture or dislocation.       Impacted fracture right femoral neck with a varus deformity. No fracture or dislocation right tibia and fibula.   Signed by: Karen Cruz 12/26/2024 4:09 PM Dictation workstation:   [id number]    XR femur right 2+ views    Result Date: 12/26/2024  Interpreted By:  Karen Cruz, STUDY: XR HIP RIGHT WITH PELVIS WHEN PERFORMED 2 OR 3 VIEWS; XR TIBIA FIBULA RIGHT 2 VIEWS; XR FEMUR RIGHT 2+ VIEWS;  12/26/2024 3:31 pm   INDICATION: Signs/Symptoms:Level fall suspected right hip fracture; Signs/Symptoms:Ground-level fall suspected right hip fracture.   COMPARISON: None.   ACCESSION NUMBER(S): [id number]; [id number]; [id number]   ORDERING CLINICIAN: CELESTE JOSHI   FINDINGS: Previous right hip, four views right femur, two views right tibia and fibula: There is an impacted fracture of the right femoral neck. There is a 2.2 cm impaction. There is an associated varus deformity.   Views of the right tibia and fibula show no fracture or dislocation.       Impacted fracture right femoral neck with a varus deformity. No fracture or dislocation right tibia and fibula.   Signed  by: Karen Cruz 12/26/2024 4:09 PM Dictation workstation:   [id number]    XR tibia fibula right 2 views    Result Date: 12/26/2024  Interpreted By:  Karen Cruz, STUDY: XR HIP RIGHT WITH PELVIS WHEN PERFORMED 2 OR 3 VIEWS; XR TIBIA FIBULA RIGHT 2 VIEWS; XR FEMUR RIGHT 2+ VIEWS;  12/26/2024 3:31 pm   INDICATION: Signs/Symptoms:Level fall suspected right hip fracture; Signs/Symptoms:Ground-level fall suspected right hip fracture.   COMPARISON: None.   ACCESSION NUMBER(S): [id number]; [id number]; [id number]   ORDERING CLINICIAN: CELESTE JOSHI   FINDINGS: Previous right hip, four views right femur, two views right tibia and fibula: There is an impacted fracture of the right femoral neck. There is a 2.2 cm impaction. There is an associated varus deformity.   Views of the right tibia and fibula show no fracture or dislocation.       Impacted fracture right femoral neck with a varus deformity. No fracture or dislocation right tibia and fibula.   Signed by: Karen Cruz 12/26/2024 4:09 PM Dictation workstation:   [id number]      Medications  aspirin, 81 mg, oral, Daily  [Held by provider] enoxaparin, 40 mg, subcutaneous, q24h  losartan, 25 mg, oral, Daily  melatonin, 3 mg, oral, Nightly  pantoprazole, 40 mg, oral, Daily before breakfast  polyethylene glycol, 17 g, oral, Daily  rosuvastatin, 40 mg, oral, Daily       PRN medications: acetaminophen **OR** acetaminophen **OR** acetaminophen, acetaminophen **OR** acetaminophen **OR** acetaminophen, HYDROmorphone, ondansetron **OR** ondansetron, oxyCODONE     Assessment/Plan   Principal Problem:    Closed fracture of neck of right femur, initial encounter      84-year-old female, who is known to history of dementia, hypertension, hyperlipidemia, coronary artery disease, GERD, came to the emergency department after a mechanical fall.  Diagnosed with closed fracture of the right neck of the femur, continue to control the pain, orthopedics is following, possible  ORIF today,.  Hypertension same  Hyperlipidemia same  History of coronary artery disease she has seen the cardiologist in November 2024 she is stable.  GERD same  DVT prophylaxis           I spent 75 minutes in the professional and overall care of this patient.    Anselmo Skinner MD

## 2024-12-27 NOTE — PROGRESS NOTES
"Pharmacy Medication History Review    Kimberly Randle \"Sunitha\" is a 84 y.o. female admitted for Closed fracture of neck of right femur, initial encounter. Pharmacy reviewed the patient's [id number] medications and allergies for accuracy.    The following updates were made to the Prior to Admission medication list:     Source of Information: patient's daughter Donna who is aware of her prescription medications and outpatient refill history     Donepezil was started a few months back and daughter notes patient has been nauseated with vomiting and severe diarrhea. The medication was supposed to be stopped due to this with additional testing to be done for her memory. Family was unaware the medication was still being given, medication was added to allergy/ intolerance, and ok to discontinue inpatient order per provider. Medication was left on her home list as was getting at facility but with note to consider discontinuation at discharge.   Medications CHANGED:  Estradiol to PRN  Medications REMOVED:   Quetiapine - daughter says was going to be a possible medication in the future but is not to be taking. Ok with provider to discontinue inpatient order as of now     The list below reflectives the updated PTA list. Please review each medication in order reconciliation for additional clarification and justification.  Prior to Admission Medications   Prescriptions Last Dose Informant Patient Reported? Taking?   aspirin 81 mg EC tablet  Child Yes No   Sig: Take 1 tablet (81 mg) by mouth once daily.   calcium citrate/vitamin D2 (MONIKA-CITRATE ORAL)  Child Yes No   Sig: Take 1 tablet by mouth once daily.   coenzyme Q-10 100 mg capsule  Child Yes No   Sig: Take 1 capsule (100 mg) by mouth once daily.   docosahexaenoic acid/epa (FISH OIL ORAL)  Child Yes No   Sig: Take 1 capsule by mouth once daily.   donepezil (Aricept) 10 mg tablet   No No   Sig: Take 1 tablet (10 mg) by mouth once daily at bedtime.   estradiol (Estrace) " 0.01 % (0.1 mg/gram) vaginal cream   No No   Sig: Use a 1/2 gram in the vagina twice weekly at bedtime   Patient taking differently: if needed (prior to planed procedure). Use a 1/2 gram in the vagina twice weekly at bedtime   glucosamine HCl/chondroitin grove (GLUCOSAMINE-CHONDROITIN ORAL)  Child Yes No   Sig: Take 1 capsule by mouth once daily.   loperamide (Imodium) 2 mg capsule   No No   Sig: Take 1 capsule (2 mg) by mouth 4 times a day as needed for diarrhea for up to 3 days.   losartan (Cozaar) 25 mg tablet   Yes No   Sig: Take 1 tablet (25 mg) by mouth once daily.   multivitamin with minerals tablet  Child Yes No   Sig: Take 1 tablet by mouth once daily.   ondansetron (Zofran) 8 mg tablet   No No   Sig: Take 1 tablet (8 mg) by mouth every 8 hours if needed for nausea or vomiting for up to 15 doses.   pantoprazole (ProtoNix) 40 mg EC tablet 12/25/2024  No Yes   Sig: Take 1 tablet (40 mg) by mouth once daily in the morning. Take before meals. Do not crush, chew, or split.   potassium/magnesium (MAGNESIUM-POTASSIUM ORAL)   Yes No   Sig: Take 1 capsule by mouth once daily.   rosuvastatin (Crestor) 40 mg tablet   No No   Sig: Take 1 tablet (40 mg) by mouth once daily.   valACYclovir (Valtrex) 1 gram tablet   Yes No   Sig: TAKE TWO TABLETS BY MOUTH TWO TIMES A DAY FOR TWO DOSES AS NEEDED FOR COLD SORE      Facility-Administered Medications: None     Casi Richardson, PharmD

## 2024-12-27 NOTE — ANESTHESIA PREPROCEDURE EVALUATION
"Patient: Kimberly Randle \"Sunitha\"    Procedure Information       Date/Time: 12/27/24 1600    Procedure: Right Hip Hemiarthroplasty (Right: Hip)    Location: [facility] A OR  / [facility] A OR    Surgeons: Amilcar Monsalve MD            Relevant Problems   Cardiac   (+) Atypical chest pain   (+) Coronary artery disease   (+) Essential hypertension, benign   (+) Hyperlipidemia   (+) Premature atrial beats      Neuro   (+) Anxiety   (+) Dementia       Clinical information reviewed:    Allergies  Meds                Past Medical History:   Diagnosis Date   • Personal history of other diseases of the circulatory system     History of coronary artery disease   • Personal history of other diseases of the circulatory system     History of cardiac disorder   • Personal history of other diseases of the circulatory system 12/06/2018    History of coronary artery disease   • Personal history of other endocrine, nutritional and metabolic disease     History of hyperlipidemia   • Personal history of other specified conditions     History of palpitations   • Personal history of transient ischemic attack (TIA), and cerebral infarction without residual deficits     History of transient cerebral ischemia      Past Surgical History:   Procedure Laterality Date   • CORONARY ARTERY BYPASS GRAFT  04/04/2018    CABG   • OTHER SURGICAL HISTORY  04/27/2018    Surgery   • OTHER SURGICAL HISTORY  12/03/2020    Hysterectomy   • OTHER SURGICAL HISTORY  12/03/2020    Lumpectomy     Social History     Tobacco Use   • Smoking status: Former     Current packs/day: 1.50     Types: Cigarettes     Passive exposure: Past   • Smokeless tobacco: Never   Vaping Use   • Vaping status: Never Used   Substance Use Topics   • Alcohol use: Not Currently   • Drug use: Never      Current Outpatient Medications   Medication Instructions   • aspirin 81 mg EC tablet 1 tablet, Daily   • calcium citrate/vitamin D2 (MONIKA-CITRATE ORAL) 1 tablet, Daily   • coenzyme Q-10 " "100 mg, Daily   • docosahexaenoic acid/epa (FISH OIL ORAL) 1 capsule, Daily   • donepezil (ARICEPT) 10 mg, oral, Nightly   • estradiol (Estrace) 0.01 % (0.1 mg/gram) vaginal cream Use a 1/2 gram in the vagina twice weekly at bedtime   • glucosamine HCl/chondroitin grove (GLUCOSAMINE-CHONDROITIN ORAL) Take 1 capsule by mouth once daily.   • loperamide (IMODIUM) 2 mg, oral, 4 times daily PRN   • losartan (COZAAR) 25 mg, Daily   • multivitamin with minerals tablet 1 tablet, Daily   • ondansetron (ZOFRAN) 8 mg, oral, Every 8 hours PRN   • pantoprazole (PROTONIX) 40 mg, oral, Daily before breakfast, Do not crush, chew, or split.   • potassium/magnesium (MAGNESIUM-POTASSIUM ORAL) 1 capsule, Daily   • rosuvastatin (CRESTOR) 40 mg, oral, Daily   • valACYclovir (Valtrex) 1 gram tablet TAKE TWO TABLETS BY MOUTH TWO TIMES A DAY FOR TWO DOSES AS NEEDED FOR COLD SORE      Allergies   Allergen Reactions   • Atorvastatin GI Upset, Confusion and Other   • Donepezil Other     Severe nausea, vomiting, and diarrhea per daughter Donna   • Amoxicillin Unknown   • Codeine Unknown        Chemistry    Lab Results   Component Value Date/Time     12/27/2024 0541    K 3.7 12/27/2024 0541     12/27/2024 0541    CO2 31 12/27/2024 0541    BUN 18 12/27/2024 0541    CREATININE 0.88 12/27/2024 0541    Lab Results   Component Value Date/Time    CALCIUM 9.3 12/27/2024 0541    ALKPHOS 80 12/26/2024 1521    AST 25 12/26/2024 1521    ALT 21 12/26/2024 1521    BILITOT 0.5 12/26/2024 1521          No results found for: \"HGBA1C\"  Lab Results   Component Value Date/Time    WBC 8.6 12/27/2024 0541    HGB 13.6 12/27/2024 0541    HCT 39.8 12/27/2024 0541     12/27/2024 0541     Lab Results   Component Value Date/Time    PROTIME 10.6 12/26/2024 1521    INR 0.9 12/26/2024 1521     No results found for: \"ABORH\"  Encounter Date: 12/26/24   Electrocardiogram, 12-lead PRN ACS symptoms   Result Value    Ventricular Rate 72    Atrial Rate 72    OK " "Interval 134    QRS Duration 80    QT Interval 382    QTC Calculation(Bazett) 418    P Axis 5    R Axis 125    T Axis 5    QRS Count 12    Q Onset 214    P Onset 147    P Offset 188    T Offset 405    QTC Fredericia 406    Narrative    Normal sinus rhythm  Left posterior fascicular block  Nonspecific ST and T wave abnormality  Abnormal ECG  When compared with ECG of 12-MAY-2024 04:19,  PREVIOUS ECG IS PRESENT  Confirmed by Mg Hernandez ([id number]) on 12/27/2024 11:04:14 AM     No results found for this or any previous visit from the past 1095 days.       Visit Vitals  /71 (BP Location: Left arm, Patient Position: Lying)   Pulse 62   Temp 36 °C (96.8 °F) (Temporal)   Resp 18   Ht 1.626 m (5' 4\")   Wt 49.9 kg (110 lb)   SpO2 96%   BMI 18.88 kg/m²   Smoking Status Former   BSA 1.5 m²     No data recorded    Physical Exam    Airway  Mallampati: II  TM distance: >3 FB  Neck ROM: full     Cardiovascular - normal exam     Dental - normal exam     Pulmonary - normal exam     Abdominal - normal exam           Anesthesia Plan    History of general anesthesia?: yes  History of complications of general anesthesia?: no    ASA 3     general     The patient is not a current smoker.    intravenous induction   Postoperative administration of opioids is intended.  Anesthetic plan and risks discussed with patient.  Use of blood products discussed with patient who.    Plan discussed with CAA and attending.    "

## 2024-12-27 NOTE — CONSULTS
"Inpatient consult to Cardiology  Consult performed by: Mg Hernandez DO  Consult ordered by: Geeta Reza PA-C  Reason for consult: clearance for surgery        History Of Present Illness:    Sunitha Randle is a 84 y.o. female  who has a pertinent medical Hx notable for CABG ( 1998 --> [place] --> LAD / SHILOH --> RCA,, fRAD--> Diag / Lcx ) aortic regurgitation by prior echocardiogram history of essential hypertension, dyslipidemia who presented to the ER with fall and subsequenbt impacted fracture of the Right femoral neck. Cardiology has been consulted for \"Clearance for surgery\"  A full hospital course review was completed.    Patient states that she was in her usual state has been having so that she \"tries to avoid falls.  Unfortunately, she fell.  She suffered an impacted fracture of the right femoral neck for which surgery is indicated.  At baseline, she is somewhat active.  She lives in a second story assisted living.  All of her requirements are in a single floor.  She is able to climb stairs, but usually avoids them due to a fear of falling.  She has not had any chest heaviness, shortness of breath.  She has not had any decompensations from her known coronary disease.  She offers no cardiovascular complaints.    Patient denies chest pain and angina.  Pt denies shortness of breath, dyspnea on exertion, orthopnea, and paroxysmal nocturnal dyspnea.  Pt denies worsening lower extremity edema.  Pt denies palpitations or syncope.  Admits to recent fall leading to presentation no fever or chills.  No cough.  No change in bowel or bladder habits.  No travel.  No sick contacts.  No recent travel    12 point review of systems was performed and is otherwise negative.  Past medical history:  As above.  Medications:  Reviewed.  Allergies:  Reviewed.  Social history:    Social History     Tobacco Use    Smoking status: Former     Current packs/day: 1.50     Types: Cigarettes     Passive exposure: Past    Smokeless " "tobacco: Never   Vaping Use    Vaping status: Never Used   Substance Use Topics    Alcohol use: Not Currently    Drug use: Never       Family history:   Family History   Problem Relation Name Age of Onset    Heart failure Mother      Cancer Father                   Last Recorded Vitals:  Vitals:    12/26/24 2232 12/26/24 2258 12/27/24 0400 12/27/24 0834   BP:  172/72 147/66 152/76   BP Location:  Left arm Left arm Left arm   Patient Position:  Lying Lying Lying   Pulse:  67 67 67   Resp:   18 18   Temp:  36.9 °C (98.4 °F) 36.2 °C (97.2 °F) 36.4 °C (97.6 °F)   TempSrc:  Temporal Temporal Temporal   SpO2:  97% 94% 93%   Weight: 49.9 kg (110 lb)      Height: 1.626 m (5' 4\")          Last Labs:  CBC - 12/27/2024:  5:41 AM  8.6 13.6 156    39.8      CMP - 12/27/2024:  5:41 AM  9.3 6.0 25 --- 0.5   _ 3.8 21 80      PTT - 12/26/2024:  3:21 PM  0.9   10.6 30     Troponin I, High Sensitivity   Date/Time Value Ref Range Status   05/12/2024 04:23 AM 7 0 - 13 ng/L Final   05/12/2024 03:16 AM 7 0 - 13 ng/L Final     BNP   Date/Time Value Ref Range Status   01/06/2019 07:40 PM 15 0 - 99 pg/mL Final     Comment:     .  <100 pg/mL - Heart failure unlikely  100-299 pg/mL - Intermediate probability of acute heart  .               failure exacerbation. Correlate with clinical  .               context and patient history.    >=300 pg/mL - Heart Failure likely. Correlate with clinical  .               context and patient history.  BNP testing is performed using different testing   methodology at [facility] than at other   [facility] hospitals. Direct result comparisons should   only be made within the same method.       VLDL   Date/Time Value Ref Range Status   11/23/2022 02:15 PM 25 0 - 40 mg/dL Final      Last I/O:  I/O last 3 completed shifts:  In: - (0 mL/kg)   Out: 500 (10 mL/kg) [Urine:500 (0.3 mL/kg/hr)]  Weight: 49.9 kg     Past Cardiology Tests (Last 3 Years):  EKG:  EKG 12/27/2024 @ 10:14      ECG 12 lead " "05/12/2024  E  Echo:  Transthoracic Echo (TTE) Limited 05/12/2024  CONCLUSIONS:   1. Left ventricular systolic function is normal with a 55-60% estimated ejection fraction.   2. Spectral Doppler shows an impaired relaxation pattern of left ventricular diastolic filling.   3. Mild to moderate mitral valve regurgitation.   4. Mild to moderate aortic valve regurgitation.   5. RVSP within normal limits.       Ejection Fractions:  No results found for: \"EF\"  Cath:  No results found for this or any previous visit from the past 1095 days.    Stress Test:  No results found for this or any previous visit from the past 1095 days.    Cardiac Imaging:  No results found for this or any previous visit from the past 1095 days.      Past Medical History:  She has a past medical history of Personal history of other diseases of the circulatory system, Personal history of other diseases of the circulatory system, Personal history of other diseases of the circulatory system (12/06/2018), Personal history of other endocrine, nutritional and metabolic disease, Personal history of other specified conditions, and Personal history of transient ischemic attack (TIA), and cerebral infarction without residual deficits.    Past Surgical History:  She has a past surgical history that includes Coronary artery bypass graft (04/04/2018); Other surgical history (04/27/2018); Other surgical history (12/03/2020); and Other surgical history (12/03/2020).      Social History:  She reports that she has quit smoking. Her smoking use included cigarettes. She has been exposed to tobacco smoke. She has never used smokeless tobacco. She reports that she does not currently use alcohol. She reports that she does not use drugs.    Family History:  Family History   Problem Relation Name Age of Onset    Heart failure Mother      Cancer Father          Allergies:  Atorvastatin, Donepezil, Amoxicillin, and Codeine    Inpatient Medications:  Scheduled medications " "  Medication Dose Route Frequency    aspirin  81 mg oral Daily    [Held by provider] enoxaparin  40 mg subcutaneous q24h    losartan  25 mg oral Daily    melatonin  3 mg oral Nightly    pantoprazole  40 mg oral Daily before breakfast    polyethylene glycol  17 g oral Daily    rosuvastatin  40 mg oral Daily     PRN medications   Medication    acetaminophen    Or    acetaminophen    Or    acetaminophen    acetaminophen    Or    acetaminophen    Or    acetaminophen    HYDROmorphone    ondansetron    Or    ondansetron    oxyCODONE     Continuous Medications   Medication Dose Last Rate     Outpatient Medications:  Current Outpatient Medications   Medication Instructions    aspirin 81 mg EC tablet 1 tablet, Daily    calcium citrate/vitamin D2 (MONIKA-CITRATE ORAL) 1 tablet, Daily    coenzyme Q-10 100 mg, Daily    docosahexaenoic acid/epa (FISH OIL ORAL) 1 capsule, Daily    donepezil (ARICEPT) 10 mg, oral, Nightly    estradiol (Estrace) 0.01 % (0.1 mg/gram) vaginal cream Use a 1/2 gram in the vagina twice weekly at bedtime    glucosamine HCl/chondroitin grove (GLUCOSAMINE-CHONDROITIN ORAL) Take 1 capsule by mouth once daily.    loperamide (IMODIUM) 2 mg, oral, 4 times daily PRN    losartan (COZAAR) 25 mg, Daily    multivitamin with minerals tablet 1 tablet, Daily    ondansetron (ZOFRAN) 8 mg, oral, Every 8 hours PRN    pantoprazole (PROTONIX) 40 mg, oral, Daily before breakfast, Do not crush, chew, or split.    potassium/magnesium (MAGNESIUM-POTASSIUM ORAL) 1 capsule, Daily    rosuvastatin (CRESTOR) 40 mg, oral, Daily    valACYclovir (Valtrex) 1 gram tablet TAKE TWO TABLETS BY MOUTH TWO TIMES A DAY FOR TWO DOSES AS NEEDED FOR COLD SORE       Physical Exam:  Documented Vital Signs   Heart Rate:  [67-71]   Temp:  [36.2 °C (97.2 °F)-36.9 °C (98.4 °F)]   Resp:  [18]   BP: (147-185)/(66-86)   Height:  [162.6 cm (5' 4\")]   Weight:  [49.9 kg (110 lb)-51.7 kg (114 lb)]   SpO2:  [92 %-98 %]   Temp:  [36.2 °C (97.2 °F)-36.9 °C (98.4 °F)] " "36.4 °C (97.6 °F)  Heart Rate:  [67-71] 67  Resp:  [18] 18  BP: (147-185)/(66-86) 152/76     Documented Fluid Status     Intake/Output Summary (Last 24 hours) at 12/27/2024 0922  Last data filed at 12/27/2024 0834  Gross per 24 hour   Intake --   Output 605 ml   Net -605 ml     Net IO Since Admission: -605 mL [12/27/24 0922]    PHYSICAL EXAMINATION    GENERAL APPEARANCE: Well developed, well nourished, in no acute distress.  VITAL SIGNS: reviewed and confirmed.   SKIN: Inspection of the skin reveals no rashes, ulcerations or petechiae.  HEENT: The sclerae were anicteric and conjunctivae were pink and moist.   NECK: Supple and symmetric.  Neck veins are flat   CHEST: Normal AP diameter and normal contour without any kyphoscoliosis.  LUNGS: Auscultation of the lungs revealed normal breath sounds without any other adventitious sounds or rubs.  CARDIOVASCULAR: There was a regular rate and rhythm without any murmurs, gallops, rubs. The carotid pulses were normal and 2+ bilaterally without bruits. Peripheral pulses were 2+ and symmetric.  ABDOMEN: Soft and nontender with normal bowel sounds.  LYMPH NODES: No lymphadenopathy was appreciated in the neck  MUSCULOSKELETAL:  No pain with movement of 3/4 limbs. RLE not evaluated given known fracture   EXTREMITIES: No cyanosis, clubbing or edema. Right lower extremity appears subjectively shorter than left.  Known fracture  NEUROLOGIC: Alert and oriented x 3. Normal affect.       Assessment/Plan   Sunitha Randle is a 84 y.o. female  who has a pertinent medical Hx notable for CABG ( 1998 --> [facility] --> LAD / SHILOH --> RCA,, fRAD--> Diag / Lcx ) aortic regurgitation by prior echocardiogram history of essential hypertension, dyslipidemia who presented to the ER with fall and subsequenbt impacted fracture of the Right femoral neck. Cardiology has been consulted for \"Clearance for surgery\"  A full hospital course review was completed.    Patient states that she was in her usual state has " "been having so that she \"tries to avoid falls.  Unfortunately, she fell.  She suffered an impacted fracture of the right femoral neck for which surgery is indicated.  At baseline, she is somewhat active.  She lives in a second story assisted living.  All of her requirements are in a single floor.  She is able to climb stairs, but usually avoids them due to a fear of falling.  She has not had any chest heaviness, shortness of breath.  She has not had any decompensations from her known coronary disease.  She offers no cardiovascular complaints.    Right Hip Arthoplasty Risk Stratification Completed 12/27/2024      Moderate to high risk individual plan for moderate risk procedure.  There are no cardiac barriers to proceeding with surgery.    She is known to have multivessel coronary artery disease that was revascularized in the 90s.  She has not had any further chest heaviness pain or anginal symptoms in the interim time. Her EKG obtained today and personally reviewed shows sinus rhythm with occasional PVCs but no ischemia.  Her echocardiogram from May 2024 shows an ejection fraction of 55 to 60% with impaired relaxation diastolic filling pattern.  RV function is normal.  There is noted to be mild to moderate mitral and tricuspid regurgitation with an RVSP of roughly 27 mmHg.    -- Judicious use of fluids, analgesia is recommended in the perioperative course  --Recommend continuing aspirin in the perioperative course for coronary artery disease protection standpoint  -- Losartan 25 may be held today as can rosuvastatin 40 mg            Peripheral IV 12/26/24 20 G Right;Anterior Forearm (Active)   Site Assessment Clean;Dry 12/26/24 2100   Line Status Saline locked;Flushed 12/26/24 2100   Dressing Status Clean;Dry 12/26/24 2100   Number of days: 1       Code Status:  Full Code      Mg Hernandez DO   Division of Cardiovascular Medicine  [facility] [place]        "

## 2024-12-27 NOTE — CARE PLAN
Problem: Pain - Adult  Goal: Verbalizes/displays adequate comfort level or baseline comfort level  Outcome: Progressing     Problem: Safety - Adult  Goal: Free from fall injury  Outcome: Progressing     Problem: Discharge Planning  Goal: Discharge to home or other facility with appropriate resources  Outcome: Progressing     Problem: Chronic Conditions and Co-morbidities  Goal: Patient's chronic conditions and co-morbidity symptoms are monitored and maintained or improved  Outcome: Progressing     Problem: Skin  Goal: Decreased wound size/increased tissue granulation at next dressing change  Outcome: Progressing  Goal: Participates in plan/prevention/treatment measures  Outcome: Progressing  Goal: Prevent/manage excess moisture  Outcome: Progressing  Goal: Prevent/minimize sheer/friction injuries  Outcome: Progressing  Goal: Promote/optimize nutrition  Outcome: Progressing  Goal: Promote skin healing  Outcome: Progressing   The patient's goals for the shift include      The clinical goals for the shift include pain managed through shift

## 2024-12-27 NOTE — H&P
H&P reviewed. The patient was examined and there are no changes to the H&P. Patient electing to proceed with surgery. Patient marked and consented.     Carlos Kim MD  PGY-4 Orthopedic Surgery  [facility]  Available by [employer]

## 2024-12-27 NOTE — ANESTHESIA PROCEDURE NOTES
Airway  Date/Time: 12/27/2024 4:40 PM  Urgency: elective      Staffing  Performed: DECLAN   Authorized by: Dean Oneil MD    Performed by: DECLAN Moy  Patient location during procedure: OR    Indications and Patient Condition  Indications for airway management: anesthesia and airway protection  Spontaneous Ventilation: absent  Sedation level: deep  Preoxygenated: yes  Patient position: sniffing  Mask difficulty assessment: 1 - vent by mask  Planned trial extubation    Final Airway Details  Final airway type: endotracheal airway      Successful airway: ETT  Cuffed: yes   Successful intubation technique: direct laryngoscopy  Blade: Vj  Blade size: #3  ETT size (mm): 7.0  Cormack-Lehane Classification: grade IIa - partial view of glottis  Placement verified by: chest auscultation and capnometry   Measured from: teeth  ETT to teeth (cm): 21  Number of attempts at approach: 1

## 2024-12-27 NOTE — PROGRESS NOTES
"Orthopaedic Surgery Progress Note    S:  No acute events overnight. Ready for OR today    O:  /71 (BP Location: Left arm, Patient Position: Lying)   Pulse 62   Temp 36 °C (96.8 °F) (Temporal)   Resp 18   Ht 1.626 m (5' 4\")   Wt 49.9 kg (110 lb)   SpO2 96%   BMI 18.88 kg/m²     Gen: arousable, NAD, appropriately conversational  Cardiac: RRR to peripheral palpation  Resp: nonlabored on RA  GI: soft, nondistended    MSK:  Right Lower Extremity:   -Skin intact  -Tender at site of injury with painful ROM.  -Fires DF/PF/EHL/FHL  -SILT in saph/sural/SPN/DPN distributions  -Foot warm, well perfused  -Palpable DP pulse, brisk cap refill  -Compartments soft and compressible      A/P: 84 y.o. female to undergo R hip hemiarthroplasty for R femoral neck fracture on 12/27/24 with Dr. Monsalve.      -Maintain NPO status  - Weightbearing: NWB RLE  - DVT PPx: SCDs, OK for aspirin in periop period per cardiology  - Pain: Multimodal pain regimen per primary  - Antibiotics: none indicated until OR    Dispo: OR today     Carlos Kim MD  PGY-4 Orthopedic Surgery  [facility]  Available by [employer]   "

## 2024-12-27 NOTE — OP NOTE
"Right Hip Hemiarthroplasty (R) Operative Note     Date: 2024 - 2024  OR Location: [facility] A OR    Name: Kimberly Randle \"Julia", : 1940, Age: 84 y.o., MRN: [id number], Sex: female    Diagnosis  Pre-op Diagnosis      * Closed fracture of neck of right femur, initial encounter [S72.001A] Post-op Diagnosis     * Closed fracture of neck of right femur, initial encounter [S72.001A]     Procedures  67653 - Right hip hemiarthroplasty, cemented     Surgeons      * Amilcar Monsalve - Primary    Resident/Fellow/Other Assistant:  Surgeons and Role:     * Carlos Kim MD - Resident - Assisting    Staff:   Circulator: Alexa  Scrub Person: Charleen  Scrub Person: Rosa    Anesthesia Staff: Anesthesiologist: Dean Oneil MD  C-AA: DECLAN Wan; DECLAN Moy    Procedure Summary  Anesthesia: Anesthesia type not filed in the log.  ASA: III  Estimated Blood Loss: 250 mL  Intra-op Medications:   Administrations occurring from 1600 to 1800 on 24:   Medication Name Total Dose   sodium chloride 0.9 % irrigation solution 1,000 mL   sodium chloride 0.9 % irrigation solution 3,000 mL   ceFAZolin (Ancef) vial 1 g 2 g   dexAMETHasone (Decadron) injection 4 mg/mL 4 mg   fentaNYL (Sublimaze) injection 50 mcg/mL 50 mcg   lactated Ringer's infusion Cannot be calculated   lidocaine PF (Xylocaine-MPF) local injection 2 % 100 mg   lubricating eye drops ophthalmic solution 2 drop   midazolam PF (Versed) injection 1 mg/mL 0.5 mg   phenylephrine 100 mcg/mL syringe 10 mL (prefilled) 400 mcg   propofol (Diprivan) injection 10 mg/mL 200 mg   rocuronium (ZeMuron) 50 mg/5 mL injection 70 mg   tranexamic acid injection 100 mg/mL 500 mg              Anesthesia Record               Intraprocedure I/O Totals          Intake    Tranexamic Acid 0.00 mL    The total shown is the total volume documented since Anesthesia Start was filed.    Total Intake 0 mL          Specimen: No specimens collected              Drains " and/or Catheters: * None in log *    Tourniquet Times:         Implants:  Implants       Type Name Action Serial No.      Implant CEMENT, BONE, TOBRAMYCIN, FULL DOSE - [id number] Implanted      Implant CEMENT, BONE, TOBRAMYCIN, FULL DOSE - [id number] Implanted      Joint CENTRALIZER, VOID C-STEM 10 DM - SN/A - [id number] Implanted N/A     Joint C-STEM, AMT SZ1 HI OFFSET - SN/A - [id number] Implanted N/A     Joint IMPLANT, ARTICUL/SYDNIE, TAPERED SPACER, +0 - [id number] Implanted      Joint Hip HIP BALL, FRACTURE HEAD, MODULAR LOI, 45MM OD - [id number] Implanted               Findings: Comminuted fracture of the femoral neck.    Medications: Ancef and tranexamic acid     Implants:  Depuy C stem cemented Stem Size 1, high offset  Depuy [place] Endo Head 45mm, +0  Musa Simplex Cement with antibiotic       Complications:  None.      Material to Labratory: None     History and indications:  The patient is a 84 y.o.with multiple medical co-morbidities presented to [facility]. The patient was found to have a displaced femoral neck fracture. The patient was admitted to the medicine service and cleared for surgery.  We discussed in detail the risks the benefits and the alternatives to surgery. The patient understands the risks would include but are not limited to infection, fracture, dislocation, stiffness, leg length inequality, chronic pain, disability, wear, loosening, reaction to implanted materials, DVT, PE, MI, stroke, loss of limb, loss of life, or potential need for further surgery. The patient understands these risks and has elected to proceed.      Procedure:  The patient was properly identified in the holding area using name, medical record number, and date of birth. Informed consent was then signed and the operative extremity was marked. Next, the patient was brought back to the operating room. A pre-procedural timeout was then performed confirming the patient's correct identity, correct procedure,  correct side, and correct site.  General anesthesia was initiated without difficulty. The patient was placed in a well-padded lateral decubitus position on the table, all bony prominences were well padded, and the operative lower extremity was prepped and draped in normal sterile fashion. The patient did receive pre-operative antibiotics.   In addition, allergy status and required equipment were reviewed. Three independent members of the operating room team agreed on the above-mentioned parameters.       A skin incision was made centered over the greater trochanter. Electrocautery was used to coagulate vessels. Dissection was then carried down to the level of the fascia. The fascia was then incised along its axis. Charnley retractors were placed. The leg was internally rotated. The piriformis was identified, taken down and tagged with #5 ethibond suture. The remainder of the short external rotators and the capsule were removed in a single layer and tagged with #5 ethibond suture.  Once the capsule was sufficiently released, the femoral shaft and remaining neck was positioned anteriorly. I made sure to leave the labrum intact. The femoral head was removed from the acetabulum using a cork screw. The femoral head was sized on the back table. Acetabular retractors were placed and the acetabulum was sized to a 45. Dissection was carefully carried down to the level of the lesser trochanter. Retractors were carefully placed around the neck. The neck was cut at the templated length. The neck cut was measured off of the level of the lesser trochanter to correspond to the templated neck cut. An oscillating saw was used to perform the neck cut.       Soft tissue was removed from the lateral aspect of the neck cut. A box osteotome and canal finder were used to gain access to the canal. Melba was used to ensure that I was lateral. Sequential broaching was then carried up to a size 1. This was found to be rotationally stable to  allow for trialing. Trial head and neck were placed on the broach. The hip was reduced and then taken through a range of motion. The leg lengths appeared equal, the hip had appropriate soft tissue tension, and appropriate range of motion. Intra-operative xray was used to confirm component position and sizing.      At this time the trial femoral component was removed. The canal was sized and found to require a 10mm distal centralizer. The canal was irrigated. Anesthesia was alerted that we would soon be cementing. 3 batches of cement were mixed and then the cement was placed down the canal using a cement gun. A distal centralizer was placed on the stem and then the stem was inserted and held in place until it hardened. The hip was then reduced. A primary capsular repair was performed. The fascia was closed primarily. Tissues were then injected with a local anesthetic. The skin was then closed in multiple layers with the superficial layer closed with Monocryl. The hip was dressed sterilely. The patient was taken to the recovery room in stable condition.      I attest that I was present and scrubbed for all key portions of this case. I supervised the entire case.    Complications:  None; patient tolerated the procedure well.    Disposition: PACU - hemodynamically stable.  Condition: stable                 Additional Details: NA    Attending Attestation: I was present and scrubbed for the key portions of the procedure.    Amilcar Monsalve  Phone Number: [phone]

## 2024-12-28 LAB
ANION GAP SERPL CALC-SCNC: 10 MMOL/L (ref 10–20)
BUN SERPL-MCNC: 20 MG/DL (ref 6–23)
CALCIUM SERPL-MCNC: 8.4 MG/DL (ref 8.6–10.3)
CHLORIDE SERPL-SCNC: 106 MMOL/L (ref 98–107)
CO2 SERPL-SCNC: 27 MMOL/L (ref 21–32)
CREAT SERPL-MCNC: 0.81 MG/DL (ref 0.5–1.05)
EGFRCR SERPLBLD CKD-EPI 2021: 71 ML/MIN/1.73M*2
ERYTHROCYTE [DISTWIDTH] IN BLOOD BY AUTOMATED COUNT: 14 % (ref 11.5–14.5)
GLUCOSE SERPL-MCNC: 119 MG/DL (ref 74–99)
HCT VFR BLD AUTO: 36.5 % (ref 36–46)
HGB BLD-MCNC: 12.7 G/DL (ref 12–16)
MCH RBC QN AUTO: 30.2 PG (ref 26–34)
MCHC RBC AUTO-ENTMCNC: 34.8 G/DL (ref 32–36)
MCV RBC AUTO: 87 FL (ref 80–100)
NRBC BLD-RTO: 0 /100 WBCS (ref 0–0)
PLATELET # BLD AUTO: 151 X10*3/UL (ref 150–450)
POTASSIUM SERPL-SCNC: 4.2 MMOL/L (ref 3.5–5.3)
RBC # BLD AUTO: 4.21 X10*6/UL (ref 4–5.2)
SODIUM SERPL-SCNC: 139 MMOL/L (ref 136–145)
WBC # BLD AUTO: 11.6 X10*3/UL (ref 4.4–11.3)

## 2024-12-28 PROCEDURE — 36415 COLL VENOUS BLD VENIPUNCTURE: CPT | Performed by: STUDENT IN AN ORGANIZED HEALTH CARE EDUCATION/TRAINING PROGRAM

## 2024-12-28 PROCEDURE — 80048 BASIC METABOLIC PNL TOTAL CA: CPT | Performed by: STUDENT IN AN ORGANIZED HEALTH CARE EDUCATION/TRAINING PROGRAM

## 2024-12-28 PROCEDURE — 97161 PT EVAL LOW COMPLEX 20 MIN: CPT | Mod: GP

## 2024-12-28 PROCEDURE — 97535 SELF CARE MNGMENT TRAINING: CPT | Mod: GO

## 2024-12-28 PROCEDURE — 85027 COMPLETE CBC AUTOMATED: CPT | Performed by: STUDENT IN AN ORGANIZED HEALTH CARE EDUCATION/TRAINING PROGRAM

## 2024-12-28 PROCEDURE — 9420000001 HC RT PATIENT EDUCATION 5 MIN

## 2024-12-28 PROCEDURE — 2500000004 HC RX 250 GENERAL PHARMACY W/ HCPCS (ALT 636 FOR OP/ED): Performed by: STUDENT IN AN ORGANIZED HEALTH CARE EDUCATION/TRAINING PROGRAM

## 2024-12-28 PROCEDURE — 97165 OT EVAL LOW COMPLEX 30 MIN: CPT | Mod: GO

## 2024-12-28 PROCEDURE — 1200000002 HC GENERAL ROOM WITH TELEMETRY DAILY

## 2024-12-28 PROCEDURE — 2500000002 HC RX 250 W HCPCS SELF ADMINISTERED DRUGS (ALT 637 FOR MEDICARE OP, ALT 636 FOR OP/ED): Performed by: STUDENT IN AN ORGANIZED HEALTH CARE EDUCATION/TRAINING PROGRAM

## 2024-12-28 PROCEDURE — 97116 GAIT TRAINING THERAPY: CPT | Mod: GP

## 2024-12-28 PROCEDURE — 99232 SBSQ HOSP IP/OBS MODERATE 35: CPT | Performed by: INTERNAL MEDICINE

## 2024-12-28 PROCEDURE — 2500000001 HC RX 250 WO HCPCS SELF ADMINISTERED DRUGS (ALT 637 FOR MEDICARE OP): Performed by: STUDENT IN AN ORGANIZED HEALTH CARE EDUCATION/TRAINING PROGRAM

## 2024-12-28 RX ADMIN — CEFAZOLIN SODIUM 2 G: 2 INJECTION, SOLUTION INTRAVENOUS at 00:56

## 2024-12-28 RX ADMIN — ROSUVASTATIN 40 MG: 20 TABLET, FILM COATED ORAL at 08:08

## 2024-12-28 RX ADMIN — CYCLOBENZAPRINE HYDROCHLORIDE 5 MG: 5 TABLET, FILM COATED ORAL at 21:18

## 2024-12-28 RX ADMIN — ASPIRIN 81 MG: 81 TABLET, COATED ORAL at 08:08

## 2024-12-28 RX ADMIN — DOCUSATE SODIUM 100 MG: 100 CAPSULE, LIQUID FILLED ORAL at 21:18

## 2024-12-28 RX ADMIN — CEFAZOLIN SODIUM 2 G: 2 INJECTION, SOLUTION INTRAVENOUS at 08:09

## 2024-12-28 RX ADMIN — PANTOPRAZOLE SODIUM 40 MG: 40 TABLET, DELAYED RELEASE ORAL at 06:27

## 2024-12-28 RX ADMIN — POLYETHYLENE GLYCOL 3350 17 G: 17 POWDER, FOR SOLUTION ORAL at 08:08

## 2024-12-28 RX ADMIN — DOCUSATE SODIUM 100 MG: 100 CAPSULE, LIQUID FILLED ORAL at 08:08

## 2024-12-28 RX ADMIN — OXYCODONE HYDROCHLORIDE 5 MG: 5 TABLET ORAL at 21:18

## 2024-12-28 RX ADMIN — ASPIRIN 81 MG: 81 TABLET, COATED ORAL at 21:18

## 2024-12-28 ASSESSMENT — COGNITIVE AND FUNCTIONAL STATUS - GENERAL
DRESSING REGULAR UPPER BODY CLOTHING: A LITTLE
PERSONAL GROOMING: A LITTLE
MOVING TO AND FROM BED TO CHAIR: A LOT
MOBILITY SCORE: 18
TURNING FROM BACK TO SIDE WHILE IN FLAT BAD: A LITTLE
PERSONAL GROOMING: A LITTLE
TOILETING: A LOT
MOBILITY SCORE: 16
CLIMB 3 TO 5 STEPS WITH RAILING: A LOT
STANDING UP FROM CHAIR USING ARMS: A LITTLE
TURNING FROM BACK TO SIDE WHILE IN FLAT BAD: A LITTLE
TOILETING: A LOT
HELP NEEDED FOR BATHING: A LITTLE
CLIMB 3 TO 5 STEPS WITH RAILING: A LOT
HELP NEEDED FOR BATHING: A LOT
WALKING IN HOSPITAL ROOM: A LOT
TOILETING: A LITTLE
HELP NEEDED FOR BATHING: A LITTLE
STANDING UP FROM CHAIR USING ARMS: A LITTLE
STANDING UP FROM CHAIR USING ARMS: A LITTLE
MOVING FROM LYING ON BACK TO SITTING ON SIDE OF FLAT BED WITH BEDRAILS: A LITTLE
WALKING IN HOSPITAL ROOM: A LITTLE
EATING MEALS: A LITTLE
DRESSING REGULAR UPPER BODY CLOTHING: A LITTLE
DRESSING REGULAR LOWER BODY CLOTHING: A LOT
PERSONAL GROOMING: A LITTLE
DAILY ACTIVITIY SCORE: 17
DAILY ACTIVITIY SCORE: 15
DRESSING REGULAR LOWER BODY CLOTHING: A LOT
MOVING TO AND FROM BED TO CHAIR: A LITTLE
WALKING IN HOSPITAL ROOM: A LITTLE
MOBILITY SCORE: 16
DAILY ACTIVITIY SCORE: 19
MOVING TO AND FROM BED TO CHAIR: A LITTLE
TURNING FROM BACK TO SIDE WHILE IN FLAT BAD: A LITTLE
CLIMB 3 TO 5 STEPS WITH RAILING: TOTAL
DRESSING REGULAR LOWER BODY CLOTHING: A LOT

## 2024-12-28 ASSESSMENT — PAIN SCALES - GENERAL
PAINLEVEL_OUTOF10: 0 - NO PAIN
PAINLEVEL_OUTOF10: 8
PAINLEVEL_OUTOF10: 5 - MODERATE PAIN
PAINLEVEL_OUTOF10: 0 - NO PAIN
PAINLEVEL_OUTOF10: 0 - NO PAIN
PAINLEVEL_OUTOF10: 3
PAINLEVEL_OUTOF10: 2

## 2024-12-28 ASSESSMENT — PAIN DESCRIPTION - LOCATION: LOCATION: HIP

## 2024-12-28 ASSESSMENT — ACTIVITIES OF DAILY LIVING (ADL)
ADL_ASSISTANCE: INDEPENDENT
ADL_ASSISTANCE: INDEPENDENT
HOME_MANAGEMENT_TIME_ENTRY: 26

## 2024-12-28 ASSESSMENT — PAIN DESCRIPTION - ORIENTATION: ORIENTATION: RIGHT

## 2024-12-28 ASSESSMENT — PAIN - FUNCTIONAL ASSESSMENT
PAIN_FUNCTIONAL_ASSESSMENT: 0-10

## 2024-12-28 NOTE — ANESTHESIA POSTPROCEDURE EVALUATION
"Patient: Kimberly Randle \"Sunitha\"    Procedure Summary       Date: 12/27/24 Room / Location: [facility] / [facility]    Anesthesia Start: 1622 Anesthesia Stop: 1905    Procedure: Right Hip Hemiarthroplasty (Right: Hip) Diagnosis:       Closed fracture of neck of right femur, initial encounter      (Closed fracture of neck of right femur, initial encounter [S72.001A])    Surgeons: Amilcar Monsalve MD Responsible Provider: Dean Oneil MD    Anesthesia Type: general ASA Status: 3            Anesthesia Type: general    Vitals Value Taken Time   /54 12/27/24 1908   Temp 36.2 12/27/24 1908   Pulse 62 12/27/24 1908   Resp 10 12/27/24 1908   SpO2 98 12/27/24 1908       Anesthesia Post Evaluation    Patient location during evaluation: PACU  Patient participation: complete - patient participated  Level of consciousness: awake  Pain management: adequate  Airway patency: patent  Cardiovascular status: acceptable  Respiratory status: acceptable  Hydration status: acceptable  Postoperative Nausea and Vomiting: none      No notable events documented.    "

## 2024-12-28 NOTE — DISCHARGE INSTRUCTIONS
Follow-Up Instructions  You will need to be seen in clinic by Dr Monsalve in 2 week(s) for a post-operative evaluation.     You will need to call and schedule an appointment, unless there is a previous appointment that appears on your discharge instructions.  The direct orthopaedic clinic appointment line phone number is [phone].  Please do not delay in calling to make this appointment.    You should also follow up with your primary care provider in 1-2 weeks.    Activity Restrictions  1) No driving until further instructed by your orthopaedic physician, which will be addressed at your outpatient appointments.    2) No driving or operating heavy machinery while taking narcotic pain medication.    3) Weight bearing status --> Weight bearing as tolerated right leg, posterior hip precautions      Wound care instructions:   1) Leave operative dressing in place until 1/3/25. Then remove and leave incision open to air. Let water run freely over incision when showering, do not scrub. Do not soak in pool or tub.    2) Call if any drainage after 7 days, increased redness/warmth/swelling at incision site, abnormal pain/tenderness of the extremity, abnormal swelling of the extremity that does not respond to elevation, SOB/chest pain.

## 2024-12-28 NOTE — PROGRESS NOTES
"Kimberly Randle \"Julia" is a 84 y.o. female on day 2 of admission presenting with Closed fracture of neck of right femur, initial encounter.    Subjective       Seen and examined, she is doing well after surgery    Objective     Physical Exam  Awake comfortable.  HEENT unremarkable.  Neck no JVD.  Heart S1-S2.  Lungs reduced entry.  Abdomen soft nontender.  Hip status post surgery  Last Recorded Vitals  Blood pressure 160/68, pulse 75, temperature 36.1 °C (97 °F), resp. rate 16, height 1.626 m (5' 4\"), weight 49.9 kg (110 lb), SpO2 97%.  Intake/Output last 3 Shifts:  I/O last 3 completed shifts:  In: 1227 (24.6 mL/kg) [I.V.:1127 (22.6 mL/kg); IV Piggyback:100]  Out: 605 (12.1 mL/kg) [Urine:605 (0.3 mL/kg/hr)]  Weight: 49.9 kg     Relevant Results  Results for orders placed or performed during the hospital encounter of 12/26/24 (from the past 96 hours)   CBC and Auto Differential   Result Value Ref Range    WBC 6.8 4.4 - 11.3 x10*3/uL    nRBC 0.0 0.0 - 0.0 /100 WBCs    RBC 4.37 4.00 - 5.20 x10*6/uL    Hemoglobin 13.2 12.0 - 16.0 g/dL    Hematocrit 39.9 36.0 - 46.0 %    MCV 91 80 - 100 fL    MCH 30.2 26.0 - 34.0 pg    MCHC 33.1 32.0 - 36.0 g/dL    RDW 14.0 11.5 - 14.5 %    Platelets 165 150 - 450 x10*3/uL    Neutrophils % 65.6 40.0 - 80.0 %    Immature Granulocytes %, Automated 0.4 0.0 - 0.9 %    Lymphocytes % 25.2 13.0 - 44.0 %    Monocytes % 7.4 2.0 - 10.0 %    Eosinophils % 1.0 0.0 - 6.0 %    Basophils % 0.4 0.0 - 2.0 %    Neutrophils Absolute 4.42 1.60 - 5.50 x10*3/uL    Immature Granulocytes Absolute, Automated 0.03 0.00 - 0.50 x10*3/uL    Lymphocytes Absolute 1.70 0.80 - 3.00 x10*3/uL    Monocytes Absolute 0.50 0.05 - 0.80 x10*3/uL    Eosinophils Absolute 0.07 0.00 - 0.40 x10*3/uL    Basophils Absolute 0.03 0.00 - 0.10 x10*3/uL   Comprehensive metabolic panel   Result Value Ref Range    Glucose 114 (H) 74 - 99 mg/dL    Sodium 142 136 - 145 mmol/L    Potassium 3.8 3.5 - 5.3 mmol/L    Chloride 108 (H) 98 - 107 " mmol/L    Bicarbonate 25 21 - 32 mmol/L    Anion Gap 13 10 - 20 mmol/L    Urea Nitrogen 23 6 - 23 mg/dL    Creatinine 1.01 0.50 - 1.05 mg/dL    eGFR 54 (L) >60 mL/min/1.73m*2    Calcium 8.8 8.6 - 10.3 mg/dL    Albumin 3.8 3.4 - 5.0 g/dL    Alkaline Phosphatase 80 33 - 136 U/L    Total Protein 6.0 (L) 6.4 - 8.2 g/dL    AST 25 9 - 39 U/L    Bilirubin, Total 0.5 0.0 - 1.2 mg/dL    ALT 21 7 - 45 U/L   Protime-INR   Result Value Ref Range    Protime 10.6 9.8 - 12.8 seconds    INR 0.9 0.9 - 1.1   aPTT   Result Value Ref Range    aPTT 30 27 - 38 seconds   Type And Screen   Result Value Ref Range    ABO TYPE O     Rh TYPE POS     ANTIBODY SCREEN NEG    Green Top   Result Value Ref Range    Extra Tube Hold for add-ons.    SST TOP   Result Value Ref Range    Extra Tube Hold for add-ons.    CBC   Result Value Ref Range    WBC 8.6 4.4 - 11.3 x10*3/uL    nRBC 0.0 0.0 - 0.0 /100 WBCs    RBC 4.53 4.00 - 5.20 x10*6/uL    Hemoglobin 13.6 12.0 - 16.0 g/dL    Hematocrit 39.8 36.0 - 46.0 %    MCV 88 80 - 100 fL    MCH 30.0 26.0 - 34.0 pg    MCHC 34.2 32.0 - 36.0 g/dL    RDW 14.0 11.5 - 14.5 %    Platelets 156 150 - 450 x10*3/uL   Basic metabolic panel   Result Value Ref Range    Glucose 103 (H) 74 - 99 mg/dL    Sodium 141 136 - 145 mmol/L    Potassium 3.7 3.5 - 5.3 mmol/L    Chloride 105 98 - 107 mmol/L    Bicarbonate 31 21 - 32 mmol/L    Anion Gap 9 (L) 10 - 20 mmol/L    Urea Nitrogen 18 6 - 23 mg/dL    Creatinine 0.88 0.50 - 1.05 mg/dL    eGFR 64 >60 mL/min/1.73m*2    Calcium 9.3 8.6 - 10.3 mg/dL   Electrocardiogram, 12-lead PRN ACS symptoms   Result Value Ref Range    Ventricular Rate 70 BPM    Atrial Rate 70 BPM    ND Interval 140 ms    QRS Duration 82 ms    QT Interval 384 ms    QTC Calculation(Bazett) 414 ms    P Axis 12 degrees    R Axis 122 degrees    T Axis -9 degrees    QRS Count 12 beats    Q Onset 214 ms    P Onset 144 ms    P Offset 186 ms    T Offset 406 ms    QTC Fredericia 404 ms   Electrocardiogram, 12-lead PRN ACS  symptoms   Result Value Ref Range    Ventricular Rate 72 BPM    Atrial Rate 72 BPM    PA Interval 134 ms    QRS Duration 80 ms    QT Interval 382 ms    QTC Calculation(Bazett) 418 ms    P Axis 5 degrees    R Axis 125 degrees    T Axis 5 degrees    QRS Count 12 beats    Q Onset 214 ms    P Onset 147 ms    P Offset 188 ms    T Offset 405 ms    QTC Fredericia 406 ms   CBC   Result Value Ref Range    WBC 11.6 (H) 4.4 - 11.3 x10*3/uL    nRBC 0.0 0.0 - 0.0 /100 WBCs    RBC 4.21 4.00 - 5.20 x10*6/uL    Hemoglobin 12.7 12.0 - 16.0 g/dL    Hematocrit 36.5 36.0 - 46.0 %    MCV 87 80 - 100 fL    MCH 30.2 26.0 - 34.0 pg    MCHC 34.8 32.0 - 36.0 g/dL    RDW 14.0 11.5 - 14.5 %    Platelets 151 150 - 450 x10*3/uL   Basic metabolic panel   Result Value Ref Range    Glucose 119 (H) 74 - 99 mg/dL    Sodium 139 136 - 145 mmol/L    Potassium 4.2 3.5 - 5.3 mmol/L    Chloride 106 98 - 107 mmol/L    Bicarbonate 27 21 - 32 mmol/L    Anion Gap 10 10 - 20 mmol/L    Urea Nitrogen 20 6 - 23 mg/dL    Creatinine 0.81 0.50 - 1.05 mg/dL    eGFR 71 >60 mL/min/1.73m*2    Calcium 8.4 (L) 8.6 - 10.3 mg/dL        Medications:  aspirin, 81 mg, oral, BID  docusate sodium, 100 mg, oral, BID  pantoprazole, 40 mg, oral, Daily before breakfast  polyethylene glycol, 17 g, oral, Daily  rosuvastatin, 40 mg, oral, Daily      PRN medications: acetaminophen, bisacodyl, bisacodyl, cyclobenzaprine, HYDROmorphone, naloxone, ondansetron **OR** ondansetron, oxyCODONE, oxyCODONE    Electrocardiogram, 12-lead PRN ACS symptoms    Result Date: 12/27/2024  Sinus rhythm with Premature atrial complexes with Aberrant conduction Right axis deviation Nonspecific ST and T wave abnormality Abnormal ECG When compared with ECG of 27-DEC-2024 10:13, (unconfirmed) Aberrant conduction is now Present Confirmed by Mg Hernandez ([id number]) on 12/27/2024 11:04:19 AM    Electrocardiogram, 12-lead PRN ACS symptoms    Result Date: 12/27/2024  Normal sinus rhythm Left posterior fascicular block  Nonspecific ST and T wave abnormality Abnormal ECG When compared with ECG of 12-MAY-2024 04:19, PREVIOUS ECG IS PRESENT Confirmed by Mg Hernandez ([id number]) on 12/27/2024 11:04:14 AM    CT head wo IV contrast    Result Date: 12/26/2024  Interpreted By:  Ariel Flores, STUDY: CT HEAD WO IV CONTRAST; CT CERVICAL SPINE WO IV CONTRAST;  12/26/2024 4:32 pm   INDICATION: Signs/Symptoms:Ground-level fall right abdominal hip pain tenderness. X-ray right femoral neck fracture.; Signs/Symptoms:Ground-level fall right abdominal hip pain tenderness. X-ray right femoral neck fracture.. Altered mental status   COMPARISON: CT cervical spine 09/01/2021.   ACCESSION NUMBER(S): [id number]; [id number]   ORDERING CLINICIAN: CELESTE JOSHI   TECHNIQUE: Axial noncontrast CT images of the head and cervical spine.   FINDINGS: BRAIN PARENCHYMA: Gray-white matter interfaces are preserved. No mass, mass effect or midline shift. Confluent periventricular and deep white matter hypodensities suggestive of moderate chronic microvascular ischemic change.   HEMORRHAGE: No acute intracranial hemorrhage. VENTRICLES and EXTRA-AXIAL SPACES: No hydrocephalus. No extra-axial collections. Generalized cerebral volume loss and associated ventricular and sulcal enlargement. EXTRACRANIAL SOFT TISSUES: CALVARIUM: No depressed calvarial fracture.     SOFT TISSUES: Within normal limits. PARANASAL SINUSES: No hemorrhage in the paranasal sinuses. MASTOIDS: Within normal limits. ORBITS: Grossly normal.   ALIGNMENT: Normal cervical lordosis. Mild degenerative anterolisthesis of C3 over C4 and C7 over T1. VERTEBRAE: No acute fracture. Vertebral body heights are maintained. There is unchanged superior endplate Schmorl node at T4. There are no suspicious osseous lesions. Degenerative disc space narrowing at C4 through C7 with posterolateral osseous spurring contributing to mild to moderate bilateral foraminal stenosis at C4-C5 and C5-C6. SPINAL CANAL: No critical  spinal canal stenosis. PREVERTEBRAL SOFT TISSUES: No prevertebral soft tissue swelling. LUNG APICES: Imaged portion of the lung apices are within normal limits.   OTHER FINDINGS: None.         No acute cortical infarct or acute intracranial hemorrhage. Moderate chronic microvascular ischemic change.   No evidence of cervical spine fracture or acute traumatic malalignment.   Signed by: Ariel Flores 12/26/2024 6:06 PM Dictation workstation:   [id number]    CT cervical spine wo IV contrast    Result Date: 12/26/2024  Interpreted By:  Ariel Flores, STUDY: CT HEAD WO IV CONTRAST; CT CERVICAL SPINE WO IV CONTRAST;  12/26/2024 4:32 pm   INDICATION: Signs/Symptoms:Ground-level fall right abdominal hip pain tenderness. X-ray right femoral neck fracture.; Signs/Symptoms:Ground-level fall right abdominal hip pain tenderness. X-ray right femoral neck fracture.. Altered mental status   COMPARISON: CT cervical spine 09/01/2021.   ACCESSION NUMBER(S): [id number]; [id number]   ORDERING CLINICIAN: CELESTE JOSHI   TECHNIQUE: Axial noncontrast CT images of the head and cervical spine.   FINDINGS: BRAIN PARENCHYMA: Gray-white matter interfaces are preserved. No mass, mass effect or midline shift. Confluent periventricular and deep white matter hypodensities suggestive of moderate chronic microvascular ischemic change.   HEMORRHAGE: No acute intracranial hemorrhage. VENTRICLES and EXTRA-AXIAL SPACES: No hydrocephalus. No extra-axial collections. Generalized cerebral volume loss and associated ventricular and sulcal enlargement. EXTRACRANIAL SOFT TISSUES: CALVARIUM: No depressed calvarial fracture.     SOFT TISSUES: Within normal limits. PARANASAL SINUSES: No hemorrhage in the paranasal sinuses. MASTOIDS: Within normal limits. ORBITS: Grossly normal.   ALIGNMENT: Normal cervical lordosis. Mild degenerative anterolisthesis of C3 over C4 and C7 over T1. VERTEBRAE: No acute fracture. Vertebral body heights are maintained. There is  unchanged superior endplate Schmorl node at T4. There are no suspicious osseous lesions. Degenerative disc space narrowing at C4 through C7 with posterolateral osseous spurring contributing to mild to moderate bilateral foraminal stenosis at C4-C5 and C5-C6. SPINAL CANAL: No critical spinal canal stenosis. PREVERTEBRAL SOFT TISSUES: No prevertebral soft tissue swelling. LUNG APICES: Imaged portion of the lung apices are within normal limits.   OTHER FINDINGS: None.         No acute cortical infarct or acute intracranial hemorrhage. Moderate chronic microvascular ischemic change.   No evidence of cervical spine fracture or acute traumatic malalignment.   Signed by: Ariel Flores 12/26/2024 6:06 PM Dictation workstation:   [id number]    CT chest abdomen pelvis w IV contrast    Result Date: 12/26/2024  STUDY: CT Chest, Abdomen, and Pelvis with IV Contrast; 12/26/2024, 4:32 PM INDICATION: Signs/Symptoms:Ground-level fall right abdominal hip pain tenderness. X-ray right femoral neck fracture. COMPARISON: None Available. ACCESSION NUMBER(S): [id number] ORDERING CLINICIAN: CELESTE JOSHI TECHNIQUE: CT of the chest, abdomen, and pelvis was performed.  Contiguous axial images were obtained at 3 mm slice thickness through the chest, abdomen, and pelvis.  Coronal and sagittal reconstructions at 3 mm slice thickness were performed.  Omnipaque 350, 90 mL was administered intravenously.  FINDINGS: CHEST: Partially visualized chest showing the heart to be of normal size. There is no pericardial effusion.  Coronary artery calcifications are identified.  Visualized portions of the thoracic aorta without aneurysm or dissection.  Subsegmental atelectasis and mild dependent edematous changes posterior costophrenic angles, LEFT greater than RIGHT.  Abdomen/pelvis: Stomach: No acute abnormality of the stomach is identified. Liver: The liver of normal size and contour. No intrahepatic ductal dilatation.. There are multiple hepatic  hemangiomas. Gallbladder of normal appearance. Common bile duct grossly normal.  No findings to suggest choledocholithiasis.. Pancreas: Normal in size and contour, without masses or ductal dilation. Spleen: Normal in size and attenuation, without focal lesions. Adrenal Glands: Normal appearance bilaterally. Lymph Nodes: No significant lymphadenopathy in the abdomen or pelvis. Kidneys: Normal size, shape, and attenuation. No hydronephrosis, masses, or stones identified. Bowel: No evidence of obstruction, masses. There are a few scattered rectosigmoid diverticula.  Peritoneum: No free fluid or free air. Appendix: There is a normal-appearing appendix. Pelvis:No free fluid or free air within the pelvis.  No pelvic adenopathy.  Urinary bladder of normal appearance. Vasculature: Abdominal aorta and its major branches appear normal. Bones and Soft Tissues: There is a mildly displaced as well as angulated RIGHT femoral neck fracture.  No dislocation. Thoracolumbar spondylotic changes and facet arthrosis.  No findings of fracture.  No listhesis.  No acute process of the posterior elements is identified.  No sacral fracture.  No rib fractures identified.  No fractures of the shoulders are identified.    Impression: Mildly displaced and angulated RIGHT femoral neck fracture.  No dislocation.  No additional fracture.  Spondylotic changes and facet arthrosis of the thoracolumbar spine.  No acute process of the spine is identified. Mild dependent edematous changes and subsegmental atelectasis LEFT costophrenic angle.  Cannot exclude small area of pneumonia. Correlate clinically.  No additional findings to suggest an acute cardiopulmonary process. No acute abdominal or pelvic process.  Note made of scattered diverticulum.  No findings of diverticulitis. Signed by Arash Strickland MD    XR hip right with pelvis when performed 2 or 3 views    Result Date: 12/26/2024  Interpreted By:  Karen Cruz, STUDY: XR HIP RIGHT WITH PELVIS  WHEN PERFORMED 2 OR 3 VIEWS; XR TIBIA FIBULA RIGHT 2 VIEWS; XR FEMUR RIGHT 2+ VIEWS;  12/26/2024 3:31 pm   INDICATION: Signs/Symptoms:Level fall suspected right hip fracture; Signs/Symptoms:Ground-level fall suspected right hip fracture.   COMPARISON: None.   ACCESSION NUMBER(S): [id number]; [id number]; [id number]   ORDERING CLINICIAN: CELESTE JOSHI   FINDINGS: Previous right hip, four views right femur, two views right tibia and fibula: There is an impacted fracture of the right femoral neck. There is a 2.2 cm impaction. There is an associated varus deformity.   Views of the right tibia and fibula show no fracture or dislocation.       Impacted fracture right femoral neck with a varus deformity. No fracture or dislocation right tibia and fibula.   Signed by: Karen Cruz 12/26/2024 4:09 PM Dictation workstation:   [id number]    XR femur right 2+ views    Result Date: 12/26/2024  Interpreted By:  Karen Cruz, STUDY: XR HIP RIGHT WITH PELVIS WHEN PERFORMED 2 OR 3 VIEWS; XR TIBIA FIBULA RIGHT 2 VIEWS; XR FEMUR RIGHT 2+ VIEWS;  12/26/2024 3:31 pm   INDICATION: Signs/Symptoms:Level fall suspected right hip fracture; Signs/Symptoms:Ground-level fall suspected right hip fracture.   COMPARISON: None.   ACCESSION NUMBER(S): [id number]; [id number]; [id number]   ORDERING CLINICIAN: CELESTE JOSHI   FINDINGS: Previous right hip, four views right femur, two views right tibia and fibula: There is an impacted fracture of the right femoral neck. There is a 2.2 cm impaction. There is an associated varus deformity.   Views of the right tibia and fibula show no fracture or dislocation.       Impacted fracture right femoral neck with a varus deformity. No fracture or dislocation right tibia and fibula.   Signed by: Karen Cruz 12/26/2024 4:09 PM Dictation workstation:   [id number]    XR tibia fibula right 2 views    Result Date: 12/26/2024  Interpreted By:  Karen Cruz, STUDY: XR HIP RIGHT WITH PELVIS WHEN  PERFORMED 2 OR 3 VIEWS; XR TIBIA FIBULA RIGHT 2 VIEWS; XR FEMUR RIGHT 2+ VIEWS;  12/26/2024 3:31 pm   INDICATION: Signs/Symptoms:Level fall suspected right hip fracture; Signs/Symptoms:Ground-level fall suspected right hip fracture.   COMPARISON: None.   ACCESSION NUMBER(S): [id number]; [id number]; [id number]   ORDERING CLINICIAN: CELESTE JOSHI   FINDINGS: Previous right hip, four views right femur, two views right tibia and fibula: There is an impacted fracture of the right femoral neck. There is a 2.2 cm impaction. There is an associated varus deformity.   Views of the right tibia and fibula show no fracture or dislocation.       Impacted fracture right femoral neck with a varus deformity. No fracture or dislocation right tibia and fibula.   Signed by: Karen Cruz 12/26/2024 4:09 PM Dictation workstation:   [id number]      Assessment/Plan   Assessment & Plan  Closed fracture of neck of right femur, initial encounter    84-year-old female, who is known to history of dementia, hypertension, hyperlipidemia, coronary artery disease, GERD, came to the emergency department after a mechanical fall.  Diagnosed with closed fracture of the right neck of the femur, continue to control the pain, orthopedics is following, she had a right hip hemiarthroplasty done yesterday, she is postop now, doing okay,  Hypertension same  Hyperlipidemia same  History of coronary artery disease she has seen the cardiologist in November 2024 she is stable.  She was also evaluated by the cardiologist here,   PT OT and discharge plans  GERD same  DVT prophylaxis       I spent 25 minutes in the professional and overall care of this patient.    Anselmo Skinner MD

## 2024-12-28 NOTE — PROGRESS NOTES
"Occupational Therapy    Evaluation/Treatment    Patient Name: Kimberly Randle \"Sunitha\"  MRN: [id number]  Department: [facility]   Room: [address] [place] [address]  Today's Date: 12/28/24  Time Calculation  Start Time: 1305  Stop Time: 1341  Time Calculation (min): 36 min       Assessment:  OT Assessment: Pt presenting with decreased activity tolerance and endurance s/p surgery hindering ability to participate in ADLs. Pt requires high intensity rehabilitation in order to regain PLOF.  Prognosis: Good  Barriers to Discharge Home: Cognition needs, Physical needs  Cognition Needs: Recollection or understanding of precautions/restrictions limited, 24hr supervision for safety awareness needed, Insight of patient limited regarding functional ability/needs  Physical Needs: 24hr mobility assistance needed, 24hr ADL assistance needed, Ambulating household distances limited by function/safety  Evaluation/Treatment Tolerance: Patient tolerated treatment well  End of Session Patient Position: Bed, 3 rail up  OT Assessment Results: Decreased ADL status, Decreased endurance, Decreased functional mobility  Prognosis: Good  Evaluation/Treatment Tolerance: Patient tolerated treatment well  Strengths: Premorbid level of function  Barriers to Participation: Comorbidities  Plan:  Treatment Interventions: ADL retraining, Functional transfer training, UE strengthening/ROM, Endurance training, Cognitive reorientation  OT Frequency: 3 times per week  OT Discharge Recommendations: High intensity level of continued care  Equipment Recommended upon Discharge: Wheeled walker  OT Recommended Transfer Status: Moderate assist, Assist of 2  OT - OK to Discharge: Yes (Per OT POC)  Treatment Interventions: ADL retraining, Functional transfer training, UE strengthening/ROM, Endurance training, Cognitive reorientation    Subjective   Current Problem:  1. Closed fracture of neck of right femur, initial encounter  Case Request Operating Room: Hip Hemiarthroplasty    Case " Request Operating Room: Hip Hemiarthroplasty    XR pelvis 1-2 views    XR pelvis 1-2 views    aspirin 81 mg EC tablet        General:   OT Received On: 12/28/24  General  Reason for Referral: 85 y/o F s/p R hip hemiarthroplasty on 12/27/24.  Referred By: LEA Kim  Past Medical History Relevant to Rehab: Dementia, HTN, CAD< CABG, HLD  Family/Caregiver Present: No  Preferred Learning Style: auditory, kinesthetic, verbal, visual, written  General Comment: Pleasantly confused, agreeable to OT eval   Precautions:  Hearing/Visual Limitations: Pt wears B hearing aides. Hearing aides not present during session. Additionally,, pt has visual implants.  LE Weight Bearing Status: Weight Bearing as Tolerated  Medical Precautions: Fall precautions  Post-Surgical Precautions: Right hip precautions (Posterior hip precautions)    Pain:  Pain Assessment  Pain Assessment: 0-10  0-10 (Numeric) Pain Score: 8  Pain Type: Surgical pain  Pain Location: Incision  Pain Orientation: Right    Objective   Cognition:  Overall Cognitive Status: Impaired at baseline  Orientation Level: Disoriented to situation, Disoriented to time, Disoriented to place  Impulsive: Within functional limits     Home Living:  Type of Home: Assisted living  Lives With: Alone  Home Layout: One level  Home Access: No concerns, Elevator  Bathroom Shower/Tub: Walk-in shower  Bathroom Toilet: Handicapped height  Bathroom Equipment: Grab bars in shower, Grab bars around toilet, Shower chair with back    Prior Function:  Level of [place]: Independent with ADLs and functional transfers  Receives Help From:  ([facility] staff)  ADL Assistance: Independent  Homemaking Assistance:  (AFL staff assists as needed)  Hand Dominance: Right  Prior Function Comments: Pt questionable historian, per PT note (where daughter was on phone), pt PLOF IND in ADLs, intermediate assists with IADLs.    Activities of Daily Living:    LE Dressing  LE Dressing: Yes  LE Dressing Adaptive Equipment: Reacher,  Sock aide, Dressing stick  Sock Level of Assistance: Maximum assistance  LE Dressing Where Assessed: Edge of bed    Activity Tolerance:  Endurance: Tolerates 10 - 20 min exercise with multiple rests  Activity Tolerance Comments: Fair tolerance to activity  Functional Standing Tolerance:     Bed Mobility/Transfers: Bed Mobility  Bed Mobility: Yes  Bed Mobility 1  Bed Mobility 1: Supine to sitting  Level of Assistance 1: Moderate assistance, Moderate verbal cues  Bed Mobility 2  Bed Mobility  2: Sitting to supine  Level of Assistance 2: Moderate assistance     Vision:Vision - Basic Assessment  Current Vision: Other (Comment) (visual implants)    Hand Function:  Hand Function  Gross Grasp: Functional  Coordination: Functional (Trigger finger R hand ring finger)  Extremities: RUE   RUE : Within Functional Limits and LUE   LUE: Within Functional Limits    Outcome Measures: [facility] Daily Activity  Putting on and taking off regular lower body clothing: A lot  Bathing (including washing, rinsing, drying): A lot  Putting on and taking off regular upper body clothing: A little  Toileting, which includes using toilet, bedpan or urinal: A lot  Taking care of personal grooming such as brushing teeth: A little  Eating Meals: A little  Daily Activity - Total Score: 15    Education Documentation  Handouts, taught by Myrna Barth OT at 12/28/2024  2:04 PM.  Learner: Patient  Readiness: Acceptance  Method: Explanation, Demonstration  Response: Verbalizes Understanding    ADL Training, taught by Myrna Barth OT at 12/28/2024  2:04 PM.  Learner: Patient  Readiness: Acceptance  Method: Explanation, Demonstration  Response: Verbalizes Understanding    Precautions, taught by Myrna Barth OT at 12/28/2024  2:04 PM.  Learner: Patient  Readiness: Acceptance  Method: Explanation, Demonstration  Response: Verbalizes Understanding    Goals:  Encounter Problems       Encounter Problems (Active)       Bathing       LTG - Patient will utilize  adaptive techniques to bathe body       Start:  12/28/24    Expected End:  01/11/25               Dressing Upper Extremities       STG - Patient dons shirt - Pullover or Button - while sitting edge of bed       Start:  12/28/24    Expected End:  01/11/25               Dressings Lower Extremities       LTG - Patient will utilize adaptive techniques/equipment to dress lower body with minimal assistance       Start:  12/28/24    Expected End:  01/11/25               Eating       STG - Patient will open all containers with supervision        Start:  12/28/24    Expected End:  01/11/25               Functional Balance       LTG - Patient will maintain balance to allow for safe mobility       Start:  12/28/24    Expected End:  01/11/25            LTG - Patient will maintain standing and sitting balance to allow for completion of daily activities while using wheeled walker        Start:  12/28/24    Expected End:  01/11/25               Functional Mobility       LTG - Patient will ambulate household distance with wheeled walker       Start:  12/28/24    Expected End:  01/11/25               Grooming       STG - Patient will tolerate standing at sink for grooming tasks with minimal assistance        Start:  12/28/24    Expected End:  01/11/25               OT Transfers       STG - Patient to transfer to and from sit to supine with contact guard assist        Start:  12/28/24    Expected End:  01/11/25               Safety       LTG - Patient will adhere to hip precautions during ADL's and transfers with minimal verbal cues and use of visual cues        Start:  12/28/24    Expected End:  01/11/25               Toileting       LTG - Patient will utilize adaptive techniques/equipment to complete daily toileting tasks       Start:  12/28/24    Expected End:  01/11/25

## 2024-12-28 NOTE — CARE PLAN
The patient's goals for the shift include      The clinical goals for the shift include pt t remain safe and manage pain symptoms  Problem: Pain - Adult  Goal: Verbalizes/displays adequate comfort level or baseline comfort level  12/28/2024 1115 by Manisha Dunham RN  Outcome: Progressing  12/28/2024 1115 by Manisha Dunham RN  Outcome: Progressing     Problem: Safety - Adult  Goal: Free from fall injury  12/28/2024 1115 by Manisha Dunham RN  Outcome: Progressing  12/28/2024 1115 by Manisha Dunham RN  Outcome: Progressing     Problem: Discharge Planning  Goal: Discharge to home or other facility with appropriate resources  12/28/2024 1115 by Manisha Dunham RN  Outcome: Progressing  12/28/2024 1115 by Manisha Dunham RN  Outcome: Progressing     Problem: Chronic Conditions and Co-morbidities  Goal: Patient's chronic conditions and co-morbidity symptoms are monitored and maintained or improved  Outcome: Progressing     Problem: Skin  Goal: Decreased wound size/increased tissue granulation at next dressing change  Outcome: Progressing  Goal: Participates in plan/prevention/treatment measures  Outcome: Progressing  Goal: Prevent/manage excess moisture  Outcome: Progressing  Goal: Prevent/minimize sheer/friction injuries  Outcome: Progressing  Goal: Promote/optimize nutrition  Outcome: Progressing  Goal: Promote skin healing  Outcome: Progressing

## 2024-12-28 NOTE — PROGRESS NOTES
TCC spoke with dtr ENRICOMELISSATHOMAS LOVELL (Daughter)  [phone]   PT rec HIGH. OT pending.   [facility]. Would also like referral sent to  [facility]. Will need follow up for acceptance. No auth needed. Medicare.  Inpatient since 12/26.       12/28/24 1350   Discharge Planning   Expected Discharge Disposition Rehab   Patient Choice   Provider Choice list and CMS website (https://medicare.gov/care-compare#search) for post-acute Quality and Resource Measure Data were provided and reviewed with: Family   Patient / Family choosing to utilize agency / facility established prior to hospitalization Yes   Intensity of Service   Intensity of Service 0-30 min

## 2024-12-28 NOTE — PROGRESS NOTES
"Orthopaedic Surgery Progress Note    S:  Recovering well on floor. Pain controlled. Tried to get up by herself yesterday    O:  /67   Pulse 63   Temp 36.6 °C (97.8 °F) (Temporal)   Resp 17   Ht 1.626 m (5' 4\")   Wt 49.9 kg (110 lb)   SpO2 93%   BMI 18.88 kg/m²     Gen: arousable, NAD, appropriately conversational  Cardiac: RRR to peripheral palpation  Resp: nonlabored on RA  GI: soft, nondistended    MSK:  Right Lower Extremity:  -Surgical dressing c/d/i  -Fires DF/PF/EHL/FHL  -SILT in saph/sural/SPN/DPN distributions  -Foot warm, well perfused  -Palpable DP pulse, brisk cap refill  -Compartments soft and compressible    PACU Xray with appropriate implant position     A/P: 84 y.o. female s/p R hip hemiarthroplasty on 12/27/24 with Dr. Monsalve.  Recovering appropriately    - Weightbearing: WBAT RLE, posterior hip precautions   - DVT PPx: SCDs, Aspirin 81mg BID x30 days  - Pain: Multimodal pain regimen per primary  - Antibiotics: ancef x24 hours  - PT/OT consult     Dispo: Will need 2 week postop follow up with Dr Monsalve    Ok to remove dressing POD 7 --  If discharged to facility, please place daily dry dressings after surgical dressing removed. If discharged home, ok to keep open to air.    Haider Sy MD  PGY-3 Orthopedic Surgery  [facility]  Available by Epic Message   "

## 2024-12-28 NOTE — PROGRESS NOTES
"Orthopaedic Surgery Progress Note    S:  Recovering in PACU postop. Doing well    O:  /65   Pulse 72   Temp 36.7 °C (98.1 °F) (Temporal)   Resp 16   Ht 1.626 m (5' 4\")   Wt 49.9 kg (110 lb)   SpO2 97%   BMI 18.88 kg/m²     Gen: arousable, NAD, appropriately conversational  Cardiac: RRR to peripheral palpation  Resp: nonlabored on RA  GI: soft, nondistended    MSK:  Right Lower Extremity:  -Surgical dressing c/d/i  -Fires DF/PF/EHL/FHL  -SILT in saph/sural/SPN/DPN distributions  -Foot warm, well perfused  -Palpable DP pulse, brisk cap refill  -Compartments soft and compressible    PACU Xray with appropriate implant position     A/P: 84 y.o. female s/p R hip hemiarthroplasty on 12/27/24 with Dr. Monsalve.  Recovering appropriately    - Weightbearing: WBAT RLE, posterior hip precautions   - DVT PPx: SCDs, Aspirin 81mg BID x30 days  - Pain: Multimodal pain regimen per primary  - Antibiotics: ancef x24 hours  - PT/OT consult     Dispo: Will need 2 week postop follow up with Dr Bello Kim MD  PGY-4 Orthopedic Surgery  [facility]  Available by [employer] Message   "

## 2024-12-28 NOTE — CARE PLAN
Problem: Pain - Adult  Goal: Verbalizes/displays adequate comfort level or baseline comfort level  Outcome: Progressing     Problem: Safety - Adult  Goal: Free from fall injury  Outcome: Progressing     Problem: Discharge Planning  Goal: Discharge to home or other facility with appropriate resources  Outcome: Progressing     Problem: Chronic Conditions and Co-morbidities  Goal: Patient's chronic conditions and co-morbidity symptoms are monitored and maintained or improved  Outcome: Progressing     Problem: Skin  Goal: Decreased wound size/increased tissue granulation at next dressing change  Outcome: Progressing  Goal: Participates in plan/prevention/treatment measures  Outcome: Progressing  Goal: Prevent/manage excess moisture  Outcome: Progressing  Goal: Prevent/minimize sheer/friction injuries  Outcome: Progressing  Goal: Promote/optimize nutrition  Outcome: Progressing  Goal: Promote skin healing  Outcome: Progressing   The patient's goals for the shift include      The clinical goals for the shift include pt t remain safe and manage pain symptoms

## 2024-12-28 NOTE — PROGRESS NOTES
Subjective Data:  Patient seen and examined, chart reviewed  -- No acute issues reported, recorded on perioperative course from a cardiac standpoint  -- No complaints offered.     NO Cardiac Telemetry      Objective Data:  Last Recorded Vitals:  Vitals:    12/27/24 2000 12/27/24 2015 12/27/24 2051 12/27/24 2352   BP: 159/59 159/53 157/81 134/67   BP Location: Left arm Left arm     Patient Position: Lying Lying     Pulse: 61 56 71 63   Resp: 12 15 16 17   Temp: 36.5 °C (97.7 °F)  36.9 °C (98.4 °F) 36.6 °C (97.8 °F)   TempSrc: Temporal  Temporal Temporal   SpO2: 100% 100% 98% 93%   Weight:       Height:           Last Labs:  Results from last 7 days   Lab Units 12/28/24  0602 12/27/24  0541 12/26/24  1521   WBC AUTO x10*3/uL 11.6* 8.6 6.8   HEMOGLOBIN g/dL 12.7 13.6 13.2   HEMATOCRIT % 36.5 39.8 39.9   PLATELETS AUTO x10*3/uL 151 156 165     Results from last 7 days   Lab Units 12/28/24  0602 12/27/24  0541 12/26/24  1521 12/25/24  0648   SODIUM mmol/L 139 141 142 138   POTASSIUM mmol/L 4.2 3.7 3.8 3.7   CHLORIDE mmol/L 106 105 108* 105   CO2 mmol/L 27 31 25 26   BUN mg/dL 20 18 23 28*   CREATININE mg/dL 0.81 0.88 1.01 0.89   CALCIUM mg/dL 8.4* 9.3 8.8 9.3   PROTEIN TOTAL g/dL  --   --  6.0* 6.4   BILIRUBIN TOTAL mg/dL  --   --  0.5 0.5   ALK PHOS U/L  --   --  80 78   ALT U/L  --   --  21 16   AST U/L  --   --  25 21   GLUCOSE mg/dL 119* 103* 114* 136*       TROPHS   Date/Time Value Ref Range Status   05/12/2024 04:23 AM 7 0 - 13 ng/L Final   05/12/2024 03:16 AM 7 0 - 13 ng/L Final     BNP   Date/Time Value Ref Range Status   01/06/2019 07:40 PM 15 0 - 99 pg/mL Final     Comment:     .  <100 pg/mL - Heart failure unlikely  100-299 pg/mL - Intermediate probability of acute heart  .               failure exacerbation. Correlate with clinical  .               context and patient history.    >=300 pg/mL - Heart Failure likely. Correlate with clinical  .               context and patient history.  BNP testing is performed  using different testing   methodology at [facility] than at other   [facility]. Direct result comparisons should   only be made within the same method.       VLDL   Date/Time Value Ref Range Status   11/23/2022 02:15 PM 25 0 - 40 mg/dL Final      Last I/O:  I/O last 3 completed shifts:  In: 1227 (24.6 mL/kg) [I.V.:1127 (22.6 mL/kg); IV Piggyback:100]  Out: 605 (12.1 mL/kg) [Urine:605 (0.3 mL/kg/hr)]  Weight: 49.9 kg     Past Cardiology Tests (Last 3 Years):  EKG:  EKG 12/27/2024 @ 10:14       ECG 12 lead 05/12/2024  E  Echo:  Transthoracic Echo (TTE) Limited 05/12/2024  CONCLUSIONS:   1. Left ventricular systolic function is normal with a 55-60% estimated ejection fraction.   2. Spectral Doppler shows an impaired relaxation pattern of left ventricular diastolic filling.   3. Mild to moderate mitral valve regurgitation.   4. Mild to moderate aortic valve regurgitation.   5. RVSP within normal limits.            Inpatient Medications:  Scheduled medications   Medication Dose Route Frequency    aspirin  81 mg oral BID    docusate sodium  100 mg oral BID    pantoprazole  40 mg oral Daily before breakfast    polyethylene glycol  17 g oral Daily    rosuvastatin  40 mg oral Daily     PRN medications   Medication    acetaminophen    bisacodyl    bisacodyl    cyclobenzaprine    HYDROmorphone    naloxone    ondansetron    Or    ondansetron    oxyCODONE    oxyCODONE     Continuous Medications   Medication Dose Last Rate    oxygen  2 L/min         Physical Exam:  Documented Vital Signs   Heart Rate:  [56-90]   Temp:  [36 °C (96.8 °F)-36.9 °C (98.4 °F)]   Resp:  [12-18]   BP: (108-174)/(53-94)   SpO2:  [91 %-100 %]   Temp:  [36 °C (96.8 °F)-36.9 °C (98.4 °F)] 36.6 °C (97.8 °F)  Heart Rate:  [56-90] 63  Resp:  [12-18] 17  BP: (108-174)/(53-94) 134/67      Oxygen Dose: *2 L/min    Documented Fluid Status     Intake/Output Summary (Last 24 hours) at 12/28/2024 0847  Last data filed at 12/28/2024 0127  Gross per  "24 hour   Intake 1227 ml   Output --   Net 1227 ml     Net IO Since Admission: 622 mL [12/28/24 0847]       Assessment/Plan   Sunitha Randle is a 84 y.o. female  who has a pertinent medical Hx notable for CABG ( 1998 --> [place] --> LAD / SHILOH --> RCA,, fRAD--> Diag / Lcx ) aortic regurgitation by prior echocardiogram history of essential hypertension, dyslipidemia who presented to the ER with fall and subsequenbt impacted fracture of the Right femoral neck. Cardiology has been consulted for \"Clearance for surgery\"  She is known to have multivessel coronary artery disease that was revascularized in the 90s.  She has not had any further chest heaviness pain or anginal symptoms in the interim time. Her EKG obtained today and personally reviewed shows sinus rhythm with occasional PVCs but no ischemia.  Her echocardiogram from May 2024 shows an ejection fraction of 55 to 60% with impaired relaxation diastolic filling pattern.  RV function is normal.  There is noted to be mild to moderate mitral and tricuspid regurgitation with an RVSP of roughly 27 mmHg.    Tolerated R hip hemiarthroplasty without apparent complications      --Recommend continuing aspirin in the perioperative course for coronary artery disease protection standpoint    -- Resume chronic cardiac medications     -- Disposition planning as per primary service  ++ NO additional cardiac testing is currently planned   ++ NO cardiac barriers to discharge                      Peripheral IV 12/26/24 20 G Right;Anterior Forearm (Active)   Site Assessment Clean;Dry;Intact 12/27/24 1900   Dressing Type Transparent 12/27/24 1900   Line Status Flushed 12/27/24 1900   Dressing Status Clean;Dry;Occlusive 12/27/24 1900   Number of days: 2       Code Status:  Full Code      Mg Hernandez DO   Division of Cardiovascular Medicine  [facility] [place]        "

## 2024-12-28 NOTE — PROGRESS NOTES
Physical Therapy    Physical Therapy Evaluation & Treatment    Patient Name: Sunitha Randle  MRN: [id number]  Department: [facility] [place] [id number]  Room: [address] [place] [address]  Today's Date: 12/28/2024   Time Calculation  Start Time: 1013  Stop Time: 1046  Time Calculation (min): 33 min    Assessment/Plan   PT Assessment  PT Assessment Results: Decreased strength, Decreased endurance, Impaired balance, Decreased mobility, Decreased cognition, Orthopedic restrictions, Pain  Rehab Prognosis: Good  Barriers to Discharge Home: Physical needs  Physical Needs: Ambulating household distances limited by function/safety, 24hr ADL assistance needed, High falls risk due to function or environment  Evaluation/Treatment Tolerance: Patient limited by fatigue, Patient limited by pain  Medical Staff Made Aware: Yes  Strengths: Premorbid level of function, Support of Caregivers, Support of extended family/friends  Barriers to Participation: Comorbidities  End of Session Communication: Bedside nurse  Assessment Comment: Pt presents today with decreased LE strength, balance, and endurance. Currnetly, pt is below the reported PLOF requiring min assist for bed mobility and shorter distance ambulation. Continued PT would benefit the pt to address the above deficits and progress gait training to reduce pt's risk of falls at D/C. Pt is independent with ADLs and ambulation at baseline. At D/C, pt would benefit from high intensity therapy.  End of Session Patient Position: Up in chair, Alarm on   IP OR SWING BED PT PLAN  Inpatient or Swing Bed: Inpatient  PT Plan  Treatment/Interventions: Bed mobility, Transfer training, Gait training, Balance training, Strengthening, Endurance training, Therapeutic exercise, Therapeutic activity, Home exercise program, Positioning, Postural re-education  PT Plan: Ongoing PT  PT Frequency: 5 times per week  PT Discharge Recommendations: High intensity level of continued care  Equipment Recommended upon Discharge: Wheeled walker  PT  Recommended Transfer Status: Assist x1, Assistive device  PT - OK to Discharge: Yes (PT POC initiated today)      Subjective     General Visit Information:  General  Reason for Referral: 83 y/o F s/p R hip hemiarthroplasty on 12/27/24.  Referred By: LEA Kim  Past Medical History Relevant to Rehab: Dementia, HTN, CAD< CABG, HLD  Family/Caregiver Present:  (Pt's daughter who is an OT listening to session through pt's phone throughout session.)  Caregiver Feedback: Pt's daughter assists pt with information on PLOF and home set up.  Prior to Session Communication: Bedside nurse  Patient Position Received: Bed, 2 rail up, Alarm on  Preferred Learning Style: auditory, kinesthetic, visual  General Comment: Pt supine in bed upon PT arrival. Cleared to participate with RN, and agreeable to PT evaluation.  Home Living:  Home Living  Type of Home: Assisted living  Lives With: Alone  Home Layout: One level  Home Access: No concerns, Elevator  Bathroom Shower/Tub: Walk-in shower  Bathroom Toilet: Handicapped height  Bathroom Equipment: Grab bars in shower, Grab bars around toilet (Shower chair)  Prior Level of Function:  Prior Function Per Pt/Caregiver Report  Level of [place]: Independent with ADLs and functional transfers  Receives Help From:  ([facility] staff)  ADL Assistance: Independent  Meal Prep:  (Per daughter, meals provided by facility, but pt able to prepare meals at baseline)  Laundry:  (Per daughter, facility completes laundry but pt able to complete laundry at baseline)  Ambulatory Assistance: Independent  Hand Dominance: Right  Precautions:  Precautions  Hearing/Visual Limitations: Per daughter, pt wears B hearing aides. Hearing aides not present during session. Additionally per daughter, pt has visual implants.  LE Weight Bearing Status: Weight Bearing as Tolerated (RLE)  Medical Precautions: Fall precautions  Post-Surgical Precautions: Right hip precautions (Posterior hip precautions)    Vital Signs (Past  2hrs)        Date/Time Vitals Session Patient Position Pulse Resp SpO2 BP MAP (mmHg)    12/28/24 1134 --  --  86  16  97 %  117/64  --                        Objective   Pain:  Pain Assessment  Pain Assessment: 0-10  0-10 (Numeric) Pain Score: 0 - No pain  Pain Type: Surgical pain  Pain Location: Hip  Pain Orientation: Right  Pain Interventions: Ambulation/increased activity, Repositioned  Response to Interventions: Increase in pain (3/10 pain reported. RN notified of pt pain rating.)  Cognition:  Cognition  Orientation Level: Disoriented to time, Disoriented to situation (Pt requiring verbal cues from daughter to state the correct month and for situation.)  Impulsive: Within functional limits    General Assessments:  Activity Tolerance  Activity Tolerance Comments: Fair tolerance to activity    Sensation  Light Touch: No apparent deficits    Strength  Strength Comments: Grossly 2- to 2/5 MMT on the RLE.  Coordination  Movements are Fluid and Coordinated: Yes    Postural Control  Postural Control: Impaired  Head Control: Forward head posture in standing with FWW support  Trunk Control: Forward trunk flexion in standing. Pt able to briefly correct with VC and FWW support    Static Sitting Balance  Static Sitting-Balance Support: Bilateral upper extremity supported, Feet supported  Static Sitting-Level of Assistance: Distant supervision  Static Sitting-Comment/Number of Minutes: At EOB    Static Standing Balance  Static Standing-Balance Support: Bilateral upper extremity supported  Static Standing-Level of Assistance: Contact guard, Minimum assistance  Static Standing-Comment/Number of Minutes: +Gait belt with FWW support  Dynamic Standing Balance  Dynamic Standing-Balance Support: Bilateral upper extremity supported  Dynamic Standing-Level of Assistance: Minimum assistance  Dynamic Standing-Balance: Turning  Dynamic Standing-Comments: +Gait belt with FWW support  Functional Assessments:  Bed Mobility  Bed Mobility:  Yes  Bed Mobility 1  Bed Mobility 1: Supine to sitting  Level of Assistance 1: Minimum assistance, Moderate verbal cues, Minimal tactile cues  Bed Mobility Comments 1: HOB elevated. Assist provided with RLE progression off the EOB. VC/TC for reach/grab on bed rail to assist trunk to EOB sitting.    Transfers  Transfer: Yes  Transfer 1  Transfer From 1: Bed to  Transfer to 1: Stand  Technique 1: Sit to stand  Transfer Device 1: Walker, Gait belt  Transfer Level of Assistance 1: Minimum assistance, Moderate verbal cues, Minimal tactile cues  Trials/Comments 1: Pt attempts to rest B hands on FWW to stand. VC for hand placement on bed and BUE push to stand.  Transfers 2  Transfer From 2: Stand to  Transfer to 2: Chair with arms  Technique 2: Stand to sit  Transfer Device 2: Walker, Gait belt  Transfer Level of Assistance 2: Minimum assistance, Minimal verbal cues, Minimal tactile cues  Trials/Comments 2: x 2 Trials. VC for BLE positioning against the chair before attempting to sit. VC for BUE reach one hand at a time for the armrests of the chair. No UE reach in trial 1, but pt able to demonstrate BUE reach in trial 2 with TC. Decreased control noted on descent to the chair.  Transfers 3  Transfer From 3: Chair with arms to  Transfer to 3: Stand  Technique 3: Sit to stand  Transfer Device 3: Walker, Gait belt  Transfer Level of Assistance 3: Minimum assistance, Minimal verbal cues  Trials/Comments 3: VC for hand placement on armrest of chair and BUE push to stand.    Stairs  Stairs: No  Extremity/Trunk Assessments:  RLE   RLE : Exceptions to WFL  Strength RLE  RLE Overall Strength: Deficits  LLE   LLE : Exceptions to WFL  Strength LLE  LLE Overall Strength: Greater than or equal to 3/5 as evidenced by functional mobility  Treatments:  Ambulation/Gait Training  Ambulation/Gait Training Performed: Yes  Ambulation/Gait Training 1  Surface 1: Level tile  Device 1: Rolling walker  Gait Support Devices: Gait belt  Assistance  1: Minimum assistance, Moderate verbal cues  Quality of Gait 1: Forward flexed posture, Decreased step length  Comments/Distance (ft) 1: 15 ft. VC for step to pattern leading with the RLE, and sequencing with FWW progression. PT demonstrates occassional trunk flexion but able to correct with VC for improved posture as well as increased weight bearing through FWW. Increased pam required to complete turns with 1 instance of LOB posteriorly requiring assist.  Outcome Measures:  [facility] Basic Mobility  Turning from your back to your side while in a flat bed without using bedrails: A little  Moving from lying on your back to sitting on the side of a flat bed without using bedrails: A little  Moving to and from bed to chair (including a wheelchair): A little  Standing up from a chair using your arms (e.g. wheelchair or bedside chair): A little  To walk in hospital room: A little  Climbing 3-5 steps with railing: Total  Basic Mobility - Total Score: 16    Encounter Problems       Encounter Problems (Active)       Balance       Goal 1 (Progressing)       Start:  12/28/24    Expected End:  01/11/25       Pt performs all sitting balance IND and standing balance with close supervision using FWW            Mobility       STG - Patient will ambulate (Progressing)       Start:  12/28/24    Expected End:  01/11/25       50 ft with CGA and proper gait mechanics using FWW            PT Problem       PT Goal 1 (Not Progressing)       Start:  12/28/24    Expected End:  01/11/25       Pt performs 2 sets of 12 reps of posterior ABEBE HEP exercises with cueing as needed            PT Transfers       STG - Patient to transfer to and from sit to supine (Progressing)       Start:  12/28/24    Expected End:  01/11/25       With CGA         STG - Patient will transfer sit to and from stand (Progressing)       Start:  12/28/24    Expected End:  01/11/25       With CGA using proper body mechanics and FWW              Education  Documentation  Handouts, taught by Goldy Burleson PT at 12/28/2024 12:46 PM.  Learner: Patient  Readiness: Acceptance  Method: Explanation, Demonstration, Handout  Response: Demonstrated Understanding    Precautions, taught by Goldy Burleson PT at 12/28/2024 12:46 PM.  Learner: Patient  Readiness: Acceptance  Method: Explanation, Demonstration, Handout  Response: Demonstrated Understanding    Body Mechanics, taught by Goldy Burleson PT at 12/28/2024 12:46 PM.  Learner: Patient  Readiness: Acceptance  Method: Explanation, Demonstration, Handout  Response: Demonstrated Understanding    Mobility Training, taught by Goldy Burleson PT at 12/28/2024 12:46 PM.  Learner: Patient  Readiness: Acceptance  Method: Explanation, Demonstration, Handout  Response: Demonstrated Understanding    Education Comments  Pt educated on weight bearing precautions, and PT POC with written handout.

## 2024-12-29 VITALS
HEART RATE: 81 BPM | WEIGHT: 110 LBS | TEMPERATURE: 97.5 F | HEIGHT: 64 IN | DIASTOLIC BLOOD PRESSURE: 74 MMHG | OXYGEN SATURATION: 92 % | BODY MASS INDEX: 18.78 KG/M2 | RESPIRATION RATE: 16 BRPM | SYSTOLIC BLOOD PRESSURE: 136 MMHG

## 2024-12-29 LAB
ANION GAP SERPL CALC-SCNC: 8 MMOL/L (ref 10–20)
BUN SERPL-MCNC: 25 MG/DL (ref 6–23)
CALCIUM SERPL-MCNC: 8.4 MG/DL (ref 8.6–10.3)
CHLORIDE SERPL-SCNC: 107 MMOL/L (ref 98–107)
CO2 SERPL-SCNC: 28 MMOL/L (ref 21–32)
CREAT SERPL-MCNC: 0.92 MG/DL (ref 0.5–1.05)
EGFRCR SERPLBLD CKD-EPI 2021: 61 ML/MIN/1.73M*2
ERYTHROCYTE [DISTWIDTH] IN BLOOD BY AUTOMATED COUNT: 14.6 % (ref 11.5–14.5)
GLUCOSE SERPL-MCNC: 116 MG/DL (ref 74–99)
HCT VFR BLD AUTO: 35.3 % (ref 36–46)
HGB BLD-MCNC: 11.7 G/DL (ref 12–16)
MCH RBC QN AUTO: 30.3 PG (ref 26–34)
MCHC RBC AUTO-ENTMCNC: 33.1 G/DL (ref 32–36)
MCV RBC AUTO: 92 FL (ref 80–100)
NRBC BLD-RTO: 0 /100 WBCS (ref 0–0)
PLATELET # BLD AUTO: 149 X10*3/UL (ref 150–450)
POTASSIUM SERPL-SCNC: 4 MMOL/L (ref 3.5–5.3)
RBC # BLD AUTO: 3.86 X10*6/UL (ref 4–5.2)
SODIUM SERPL-SCNC: 139 MMOL/L (ref 136–145)
WBC # BLD AUTO: 8.6 X10*3/UL (ref 4.4–11.3)

## 2024-12-29 PROCEDURE — 85027 COMPLETE CBC AUTOMATED: CPT | Performed by: INTERNAL MEDICINE

## 2024-12-29 PROCEDURE — 2500000004 HC RX 250 GENERAL PHARMACY W/ HCPCS (ALT 636 FOR OP/ED): Performed by: STUDENT IN AN ORGANIZED HEALTH CARE EDUCATION/TRAINING PROGRAM

## 2024-12-29 PROCEDURE — 36415 COLL VENOUS BLD VENIPUNCTURE: CPT | Performed by: INTERNAL MEDICINE

## 2024-12-29 PROCEDURE — 2500000001 HC RX 250 WO HCPCS SELF ADMINISTERED DRUGS (ALT 637 FOR MEDICARE OP): Performed by: STUDENT IN AN ORGANIZED HEALTH CARE EDUCATION/TRAINING PROGRAM

## 2024-12-29 PROCEDURE — 1200000002 HC GENERAL ROOM WITH TELEMETRY DAILY

## 2024-12-29 PROCEDURE — 80048 BASIC METABOLIC PNL TOTAL CA: CPT | Performed by: INTERNAL MEDICINE

## 2024-12-29 PROCEDURE — 2500000002 HC RX 250 W HCPCS SELF ADMINISTERED DRUGS (ALT 637 FOR MEDICARE OP, ALT 636 FOR OP/ED): Performed by: STUDENT IN AN ORGANIZED HEALTH CARE EDUCATION/TRAINING PROGRAM

## 2024-12-29 PROCEDURE — 9420000001 HC RT PATIENT EDUCATION 5 MIN

## 2024-12-29 RX ADMIN — POLYETHYLENE GLYCOL 3350 17 G: 17 POWDER, FOR SOLUTION ORAL at 09:01

## 2024-12-29 RX ADMIN — ASPIRIN 81 MG: 81 TABLET, COATED ORAL at 09:01

## 2024-12-29 RX ADMIN — DOCUSATE SODIUM 100 MG: 100 CAPSULE, LIQUID FILLED ORAL at 09:01

## 2024-12-29 RX ADMIN — ASPIRIN 81 MG: 81 TABLET, COATED ORAL at 21:56

## 2024-12-29 RX ADMIN — PANTOPRAZOLE SODIUM 40 MG: 40 TABLET, DELAYED RELEASE ORAL at 06:28

## 2024-12-29 RX ADMIN — OXYCODONE HYDROCHLORIDE 5 MG: 5 TABLET ORAL at 04:37

## 2024-12-29 RX ADMIN — ACETAMINOPHEN 650 MG: 325 TABLET, FILM COATED ORAL at 09:01

## 2024-12-29 RX ADMIN — ROSUVASTATIN 40 MG: 20 TABLET, FILM COATED ORAL at 09:00

## 2024-12-29 RX ADMIN — DOCUSATE SODIUM 100 MG: 100 CAPSULE, LIQUID FILLED ORAL at 21:56

## 2024-12-29 RX ADMIN — ACETAMINOPHEN 650 MG: 325 TABLET, FILM COATED ORAL at 22:05

## 2024-12-29 RX ADMIN — ACETAMINOPHEN 650 MG: 325 TABLET, FILM COATED ORAL at 04:37

## 2024-12-29 ASSESSMENT — COGNITIVE AND FUNCTIONAL STATUS - GENERAL
TURNING FROM BACK TO SIDE WHILE IN FLAT BAD: A LITTLE
WALKING IN HOSPITAL ROOM: A LITTLE
MOVING TO AND FROM BED TO CHAIR: A LITTLE
TOILETING: A LITTLE
PERSONAL GROOMING: A LITTLE
DRESSING REGULAR LOWER BODY CLOTHING: A LOT
HELP NEEDED FOR BATHING: A LOT
MOVING TO AND FROM BED TO CHAIR: A LITTLE
MOVING FROM LYING ON BACK TO SITTING ON SIDE OF FLAT BED WITH BEDRAILS: A LITTLE
MOBILITY SCORE: 18
DRESSING REGULAR LOWER BODY CLOTHING: A LOT
TURNING FROM BACK TO SIDE WHILE IN FLAT BAD: A LITTLE
DAILY ACTIVITIY SCORE: 17
DAILY ACTIVITIY SCORE: 17
DRESSING REGULAR UPPER BODY CLOTHING: A LITTLE
STANDING UP FROM CHAIR USING ARMS: A LITTLE
CLIMB 3 TO 5 STEPS WITH RAILING: A LOT
DRESSING REGULAR UPPER BODY CLOTHING: A LITTLE
TOILETING: A LOT
HELP NEEDED FOR BATHING: A LITTLE
MOBILITY SCORE: 17
WALKING IN HOSPITAL ROOM: A LITTLE
PERSONAL GROOMING: A LITTLE
STANDING UP FROM CHAIR USING ARMS: A LITTLE
CLIMB 3 TO 5 STEPS WITH RAILING: A LOT

## 2024-12-29 ASSESSMENT — PAIN SCALES - GENERAL
PAINLEVEL_OUTOF10: 2
PAINLEVEL_OUTOF10: 3
PAINLEVEL_OUTOF10: 6
PAINLEVEL_OUTOF10: 2

## 2024-12-29 ASSESSMENT — PAIN DESCRIPTION - ORIENTATION
ORIENTATION: RIGHT

## 2024-12-29 ASSESSMENT — PAIN SCALES - WONG BAKER
WONGBAKER_NUMERICALRESPONSE: HURTS LITTLE BIT
WONGBAKER_NUMERICALRESPONSE: HURTS LITTLE BIT

## 2024-12-29 ASSESSMENT — PAIN - FUNCTIONAL ASSESSMENT
PAIN_FUNCTIONAL_ASSESSMENT: 0-10

## 2024-12-29 ASSESSMENT — PAIN DESCRIPTION - LOCATION
LOCATION: HIP

## 2024-12-29 NOTE — PROGRESS NOTES
"Kimberly Randle \"Julia" is a 84 y.o. female on day 3 of admission presenting with Closed fracture of neck of right femur, initial encounter.    Subjective       Seen and examined, discussed with the patient and the staff, she is doing well after surgery    Objective     Physical Exam  Awake comfortable.  HEENT unremarkable.  Neck no JVD.  Heart S1-S2.  Lungs reduced entry.  Abdomen soft nontender.  Hip status post surgery  Last Recorded Vitals  Blood pressure 132/68, pulse 65, temperature 36 °C (96.8 °F), resp. rate 16, height 1.626 m (5' 4\"), weight 49.9 kg (110 lb), SpO2 94%.  Intake/Output last 3 Shifts:  I/O last 3 completed shifts:  In: 1467 (29.4 mL/kg) [P.O.:240; I.V.:1127 (22.6 mL/kg); IV Piggyback:100]  Out: 250 (5 mL/kg) [Urine:250 (0.1 mL/kg/hr)]  Weight: 49.9 kg     Relevant Results  Results for orders placed or performed during the hospital encounter of 12/26/24 (from the past 96 hours)   CBC and Auto Differential   Result Value Ref Range    WBC 6.8 4.4 - 11.3 x10*3/uL    nRBC 0.0 0.0 - 0.0 /100 WBCs    RBC 4.37 4.00 - 5.20 x10*6/uL    Hemoglobin 13.2 12.0 - 16.0 g/dL    Hematocrit 39.9 36.0 - 46.0 %    MCV 91 80 - 100 fL    MCH 30.2 26.0 - 34.0 pg    MCHC 33.1 32.0 - 36.0 g/dL    RDW 14.0 11.5 - 14.5 %    Platelets 165 150 - 450 x10*3/uL    Neutrophils % 65.6 40.0 - 80.0 %    Immature Granulocytes %, Automated 0.4 0.0 - 0.9 %    Lymphocytes % 25.2 13.0 - 44.0 %    Monocytes % 7.4 2.0 - 10.0 %    Eosinophils % 1.0 0.0 - 6.0 %    Basophils % 0.4 0.0 - 2.0 %    Neutrophils Absolute 4.42 1.60 - 5.50 x10*3/uL    Immature Granulocytes Absolute, Automated 0.03 0.00 - 0.50 x10*3/uL    Lymphocytes Absolute 1.70 0.80 - 3.00 x10*3/uL    Monocytes Absolute 0.50 0.05 - 0.80 x10*3/uL    Eosinophils Absolute 0.07 0.00 - 0.40 x10*3/uL    Basophils Absolute 0.03 0.00 - 0.10 x10*3/uL   Comprehensive metabolic panel   Result Value Ref Range    Glucose 114 (H) 74 - 99 mg/dL    Sodium 142 136 - 145 mmol/L    Potassium 3.8 " 3.5 - 5.3 mmol/L    Chloride 108 (H) 98 - 107 mmol/L    Bicarbonate 25 21 - 32 mmol/L    Anion Gap 13 10 - 20 mmol/L    Urea Nitrogen 23 6 - 23 mg/dL    Creatinine 1.01 0.50 - 1.05 mg/dL    eGFR 54 (L) >60 mL/min/1.73m*2    Calcium 8.8 8.6 - 10.3 mg/dL    Albumin 3.8 3.4 - 5.0 g/dL    Alkaline Phosphatase 80 33 - 136 U/L    Total Protein 6.0 (L) 6.4 - 8.2 g/dL    AST 25 9 - 39 U/L    Bilirubin, Total 0.5 0.0 - 1.2 mg/dL    ALT 21 7 - 45 U/L   Protime-INR   Result Value Ref Range    Protime 10.6 9.8 - 12.8 seconds    INR 0.9 0.9 - 1.1   aPTT   Result Value Ref Range    aPTT 30 27 - 38 seconds   Type And Screen   Result Value Ref Range    ABO TYPE O     Rh TYPE POS     ANTIBODY SCREEN NEG    Green Top   Result Value Ref Range    Extra Tube Hold for add-ons.    SST TOP   Result Value Ref Range    Extra Tube Hold for add-ons.    CBC   Result Value Ref Range    WBC 8.6 4.4 - 11.3 x10*3/uL    nRBC 0.0 0.0 - 0.0 /100 WBCs    RBC 4.53 4.00 - 5.20 x10*6/uL    Hemoglobin 13.6 12.0 - 16.0 g/dL    Hematocrit 39.8 36.0 - 46.0 %    MCV 88 80 - 100 fL    MCH 30.0 26.0 - 34.0 pg    MCHC 34.2 32.0 - 36.0 g/dL    RDW 14.0 11.5 - 14.5 %    Platelets 156 150 - 450 x10*3/uL   Basic metabolic panel   Result Value Ref Range    Glucose 103 (H) 74 - 99 mg/dL    Sodium 141 136 - 145 mmol/L    Potassium 3.7 3.5 - 5.3 mmol/L    Chloride 105 98 - 107 mmol/L    Bicarbonate 31 21 - 32 mmol/L    Anion Gap 9 (L) 10 - 20 mmol/L    Urea Nitrogen 18 6 - 23 mg/dL    Creatinine 0.88 0.50 - 1.05 mg/dL    eGFR 64 >60 mL/min/1.73m*2    Calcium 9.3 8.6 - 10.3 mg/dL   Electrocardiogram, 12-lead PRN ACS symptoms   Result Value Ref Range    Ventricular Rate 70 BPM    Atrial Rate 70 BPM    MO Interval 140 ms    QRS Duration 82 ms    QT Interval 384 ms    QTC Calculation(Bazett) 414 ms    P Axis 12 degrees    R Axis 122 degrees    T Axis -9 degrees    QRS Count 12 beats    Q Onset 214 ms    P Onset 144 ms    P Offset 186 ms    T Offset 406 ms    QTC Fredericia  404 ms   Electrocardiogram, 12-lead PRN ACS symptoms   Result Value Ref Range    Ventricular Rate 72 BPM    Atrial Rate 72 BPM    NH Interval 134 ms    QRS Duration 80 ms    QT Interval 382 ms    QTC Calculation(Bazett) 418 ms    P Axis 5 degrees    R Axis 125 degrees    T Axis 5 degrees    QRS Count 12 beats    Q Onset 214 ms    P Onset 147 ms    P Offset 188 ms    T Offset 405 ms    QTC Fredericia 406 ms   CBC   Result Value Ref Range    WBC 11.6 (H) 4.4 - 11.3 x10*3/uL    nRBC 0.0 0.0 - 0.0 /100 WBCs    RBC 4.21 4.00 - 5.20 x10*6/uL    Hemoglobin 12.7 12.0 - 16.0 g/dL    Hematocrit 36.5 36.0 - 46.0 %    MCV 87 80 - 100 fL    MCH 30.2 26.0 - 34.0 pg    MCHC 34.8 32.0 - 36.0 g/dL    RDW 14.0 11.5 - 14.5 %    Platelets 151 150 - 450 x10*3/uL   Basic metabolic panel   Result Value Ref Range    Glucose 119 (H) 74 - 99 mg/dL    Sodium 139 136 - 145 mmol/L    Potassium 4.2 3.5 - 5.3 mmol/L    Chloride 106 98 - 107 mmol/L    Bicarbonate 27 21 - 32 mmol/L    Anion Gap 10 10 - 20 mmol/L    Urea Nitrogen 20 6 - 23 mg/dL    Creatinine 0.81 0.50 - 1.05 mg/dL    eGFR 71 >60 mL/min/1.73m*2    Calcium 8.4 (L) 8.6 - 10.3 mg/dL   CBC   Result Value Ref Range    WBC 8.6 4.4 - 11.3 x10*3/uL    nRBC 0.0 0.0 - 0.0 /100 WBCs    RBC 3.86 (L) 4.00 - 5.20 x10*6/uL    Hemoglobin 11.7 (L) 12.0 - 16.0 g/dL    Hematocrit 35.3 (L) 36.0 - 46.0 %    MCV 92 80 - 100 fL    MCH 30.3 26.0 - 34.0 pg    MCHC 33.1 32.0 - 36.0 g/dL    RDW 14.6 (H) 11.5 - 14.5 %    Platelets 149 (L) 150 - 450 x10*3/uL   Basic Metabolic Panel   Result Value Ref Range    Glucose 116 (H) 74 - 99 mg/dL    Sodium 139 136 - 145 mmol/L    Potassium 4.0 3.5 - 5.3 mmol/L    Chloride 107 98 - 107 mmol/L    Bicarbonate 28 21 - 32 mmol/L    Anion Gap 8 (L) 10 - 20 mmol/L    Urea Nitrogen 25 (H) 6 - 23 mg/dL    Creatinine 0.92 0.50 - 1.05 mg/dL    eGFR 61 >60 mL/min/1.73m*2    Calcium 8.4 (L) 8.6 - 10.3 mg/dL        Medications:  aspirin, 81 mg, oral, BID  docusate sodium, 100 mg,  oral, BID  pantoprazole, 40 mg, oral, Daily before breakfast  polyethylene glycol, 17 g, oral, Daily  rosuvastatin, 40 mg, oral, Daily      PRN medications: acetaminophen, bisacodyl, bisacodyl, cyclobenzaprine, HYDROmorphone, naloxone, ondansetron **OR** ondansetron, oxyCODONE, oxyCODONE    Electrocardiogram, 12-lead PRN ACS symptoms    Result Date: 12/27/2024  Sinus rhythm with Premature atrial complexes with Aberrant conduction Right axis deviation Nonspecific ST and T wave abnormality Abnormal ECG When compared with ECG of 27-DEC-2024 10:13, (unconfirmed) Aberrant conduction is now Present Confirmed by Mg Hernandez ([id number]) on 12/27/2024 11:04:19 AM    Electrocardiogram, 12-lead PRN ACS symptoms    Result Date: 12/27/2024  Normal sinus rhythm Left posterior fascicular block Nonspecific ST and T wave abnormality Abnormal ECG When compared with ECG of 12-MAY-2024 04:19, PREVIOUS ECG IS PRESENT Confirmed by Mg Hernandez ([id number]) on 12/27/2024 11:04:14 AM    CT head wo IV contrast    Result Date: 12/26/2024  Interpreted By:  Ariel Flores, STUDY: CT HEAD WO IV CONTRAST; CT CERVICAL SPINE WO IV CONTRAST;  12/26/2024 4:32 pm   INDICATION: Signs/Symptoms:Ground-level fall right abdominal hip pain tenderness. X-ray right femoral neck fracture.; Signs/Symptoms:Ground-level fall right abdominal hip pain tenderness. X-ray right femoral neck fracture.. Altered mental status   COMPARISON: CT cervical spine 09/01/2021.   ACCESSION NUMBER(S): [id number]; [id number]   ORDERING CLINICIAN: CELESTE JOSHI   TECHNIQUE: Axial noncontrast CT images of the head and cervical spine.   FINDINGS: BRAIN PARENCHYMA: Gray-white matter interfaces are preserved. No mass, mass effect or midline shift. Confluent periventricular and deep white matter hypodensities suggestive of moderate chronic microvascular ischemic change.   HEMORRHAGE: No acute intracranial hemorrhage. VENTRICLES and EXTRA-AXIAL SPACES: No hydrocephalus. No extra-axial  collections. Generalized cerebral volume loss and associated ventricular and sulcal enlargement. EXTRACRANIAL SOFT TISSUES: CALVARIUM: No depressed calvarial fracture.     SOFT TISSUES: Within normal limits. PARANASAL SINUSES: No hemorrhage in the paranasal sinuses. MASTOIDS: Within normal limits. ORBITS: Grossly normal.   ALIGNMENT: Normal cervical lordosis. Mild degenerative anterolisthesis of C3 over C4 and C7 over T1. VERTEBRAE: No acute fracture. Vertebral body heights are maintained. There is unchanged superior endplate Schmorl node at T4. There are no suspicious osseous lesions. Degenerative disc space narrowing at C4 through C7 with posterolateral osseous spurring contributing to mild to moderate bilateral foraminal stenosis at C4-C5 and C5-C6. SPINAL CANAL: No critical spinal canal stenosis. PREVERTEBRAL SOFT TISSUES: No prevertebral soft tissue swelling. LUNG APICES: Imaged portion of the lung apices are within normal limits.   OTHER FINDINGS: None.         No acute cortical infarct or acute intracranial hemorrhage. Moderate chronic microvascular ischemic change.   No evidence of cervical spine fracture or acute traumatic malalignment.   Signed by: Ariel Flores 12/26/2024 6:06 PM Dictation workstation:   [id number]    CT cervical spine wo IV contrast    Result Date: 12/26/2024  Interpreted By:  Ariel Flores, STUDY: CT HEAD WO IV CONTRAST; CT CERVICAL SPINE WO IV CONTRAST;  12/26/2024 4:32 pm   INDICATION: Signs/Symptoms:Ground-level fall right abdominal hip pain tenderness. X-ray right femoral neck fracture.; Signs/Symptoms:Ground-level fall right abdominal hip pain tenderness. X-ray right femoral neck fracture.. Altered mental status   COMPARISON: CT cervical spine 09/01/2021.   ACCESSION NUMBER(S): [id number]; [id number]   ORDERING CLINICIAN: CELESTE JOSHI   TECHNIQUE: Axial noncontrast CT images of the head and cervical spine.   FINDINGS: BRAIN PARENCHYMA: Gray-white matter interfaces are  preserved. No mass, mass effect or midline shift. Confluent periventricular and deep white matter hypodensities suggestive of moderate chronic microvascular ischemic change.   HEMORRHAGE: No acute intracranial hemorrhage. VENTRICLES and EXTRA-AXIAL SPACES: No hydrocephalus. No extra-axial collections. Generalized cerebral volume loss and associated ventricular and sulcal enlargement. EXTRACRANIAL SOFT TISSUES: CALVARIUM: No depressed calvarial fracture.     SOFT TISSUES: Within normal limits. PARANASAL SINUSES: No hemorrhage in the paranasal sinuses. MASTOIDS: Within normal limits. ORBITS: Grossly normal.   ALIGNMENT: Normal cervical lordosis. Mild degenerative anterolisthesis of C3 over C4 and C7 over T1. VERTEBRAE: No acute fracture. Vertebral body heights are maintained. There is unchanged superior endplate Schmorl node at T4. There are no suspicious osseous lesions. Degenerative disc space narrowing at C4 through C7 with posterolateral osseous spurring contributing to mild to moderate bilateral foraminal stenosis at C4-C5 and C5-C6. SPINAL CANAL: No critical spinal canal stenosis. PREVERTEBRAL SOFT TISSUES: No prevertebral soft tissue swelling. LUNG APICES: Imaged portion of the lung apices are within normal limits.   OTHER FINDINGS: None.         No acute cortical infarct or acute intracranial hemorrhage. Moderate chronic microvascular ischemic change.   No evidence of cervical spine fracture or acute traumatic malalignment.   Signed by: Ariel Flores 12/26/2024 6:06 PM Dictation workstation:   [id number]    CT chest abdomen pelvis w IV contrast    Result Date: 12/26/2024  STUDY: CT Chest, Abdomen, and Pelvis with IV Contrast; 12/26/2024, 4:32 PM INDICATION: Signs/Symptoms:Ground-level fall right abdominal hip pain tenderness. X-ray right femoral neck fracture. COMPARISON: None Available. ACCESSION NUMBER(S): [id number] ORDERING CLINICIAN: CELESTE JOSHI TECHNIQUE: CT of the chest, abdomen, and pelvis was  performed.  Contiguous axial images were obtained at 3 mm slice thickness through the chest, abdomen, and pelvis.  Coronal and sagittal reconstructions at 3 mm slice thickness were performed.  Omnipaque 350, 90 mL was administered intravenously.  FINDINGS: CHEST: Partially visualized chest showing the heart to be of normal size. There is no pericardial effusion.  Coronary artery calcifications are identified.  Visualized portions of the thoracic aorta without aneurysm or dissection.  Subsegmental atelectasis and mild dependent edematous changes posterior costophrenic angles, LEFT greater than RIGHT.  Abdomen/pelvis: Stomach: No acute abnormality of the stomach is identified. Liver: The liver of normal size and contour. No intrahepatic ductal dilatation.. There are multiple hepatic hemangiomas. Gallbladder of normal appearance. Common bile duct grossly normal.  No findings to suggest choledocholithiasis.. Pancreas: Normal in size and contour, without masses or ductal dilation. Spleen: Normal in size and attenuation, without focal lesions. Adrenal Glands: Normal appearance bilaterally. Lymph Nodes: No significant lymphadenopathy in the abdomen or pelvis. Kidneys: Normal size, shape, and attenuation. No hydronephrosis, masses, or stones identified. Bowel: No evidence of obstruction, masses. There are a few scattered rectosigmoid diverticula.  Peritoneum: No free fluid or free air. Appendix: There is a normal-appearing appendix. Pelvis:No free fluid or free air within the pelvis.  No pelvic adenopathy.  Urinary bladder of normal appearance. Vasculature: Abdominal aorta and its major branches appear normal. Bones and Soft Tissues: There is a mildly displaced as well as angulated RIGHT femoral neck fracture.  No dislocation. Thoracolumbar spondylotic changes and facet arthrosis.  No findings of fracture.  No listhesis.  No acute process of the posterior elements is identified.  No sacral fracture.  No rib fractures  identified.  No fractures of the shoulders are identified.    Impression: Mildly displaced and angulated RIGHT femoral neck fracture.  No dislocation.  No additional fracture.  Spondylotic changes and facet arthrosis of the thoracolumbar spine.  No acute process of the spine is identified. Mild dependent edematous changes and subsegmental atelectasis LEFT costophrenic angle.  Cannot exclude small area of pneumonia. Correlate clinically.  No additional findings to suggest an acute cardiopulmonary process. No acute abdominal or pelvic process.  Note made of scattered diverticulum.  No findings of diverticulitis. Signed by Celeste Strickalnd MD    XR hip right with pelvis when performed 2 or 3 views    Result Date: 12/26/2024  Interpreted By:  Karen Cruz, STUDY: XR HIP RIGHT WITH PELVIS WHEN PERFORMED 2 OR 3 VIEWS; XR TIBIA FIBULA RIGHT 2 VIEWS; XR FEMUR RIGHT 2+ VIEWS;  12/26/2024 3:31 pm   INDICATION: Signs/Symptoms:Level fall suspected right hip fracture; Signs/Symptoms:Ground-level fall suspected right hip fracture.   COMPARISON: None.   ACCESSION NUMBER(S): [id number]; [id number]; [id number]   ORDERING CLINICIAN: CELESTE JOSHI   FINDINGS: Previous right hip, four views right femur, two views right tibia and fibula: There is an impacted fracture of the right femoral neck. There is a 2.2 cm impaction. There is an associated varus deformity.   Views of the right tibia and fibula show no fracture or dislocation.       Impacted fracture right femoral neck with a varus deformity. No fracture or dislocation right tibia and fibula.   Signed by: Karen Cruz 12/26/2024 4:09 PM Dictation workstation:   [id number]    XR femur right 2+ views    Result Date: 12/26/2024  Interpreted By:  Karen Cruz, STUDY: XR HIP RIGHT WITH PELVIS WHEN PERFORMED 2 OR 3 VIEWS; XR TIBIA FIBULA RIGHT 2 VIEWS; XR FEMUR RIGHT 2+ VIEWS;  12/26/2024 3:31 pm   INDICATION: Signs/Symptoms:Level fall suspected right hip fracture;  Signs/Symptoms:Ground-level fall suspected right hip fracture.   COMPARISON: None.   ACCESSION NUMBER(S): [id number]; [id number]; [id number]   ORDERING CLINICIAN: CELESTE JOSHI   FINDINGS: Previous right hip, four views right femur, two views right tibia and fibula: There is an impacted fracture of the right femoral neck. There is a 2.2 cm impaction. There is an associated varus deformity.   Views of the right tibia and fibula show no fracture or dislocation.       Impacted fracture right femoral neck with a varus deformity. No fracture or dislocation right tibia and fibula.   Signed by: Karen Cruz 12/26/2024 4:09 PM Dictation workstation:   [id number]    XR tibia fibula right 2 views    Result Date: 12/26/2024  Interpreted By:  Karen Cruz, STUDY: XR HIP RIGHT WITH PELVIS WHEN PERFORMED 2 OR 3 VIEWS; XR TIBIA FIBULA RIGHT 2 VIEWS; XR FEMUR RIGHT 2+ VIEWS;  12/26/2024 3:31 pm   INDICATION: Signs/Symptoms:Level fall suspected right hip fracture; Signs/Symptoms:Ground-level fall suspected right hip fracture.   COMPARISON: None.   ACCESSION NUMBER(S): [id number]; [id number]; [id number]   ORDERING CLINICIAN: CELESTE JOSHI   FINDINGS: Previous right hip, four views right femur, two views right tibia and fibula: There is an impacted fracture of the right femoral neck. There is a 2.2 cm impaction. There is an associated varus deformity.   Views of the right tibia and fibula show no fracture or dislocation.       Impacted fracture right femoral neck with a varus deformity. No fracture or dislocation right tibia and fibula.   Signed by: Karen Cruz 12/26/2024 4:09 PM Dictation workstation:   [id number]      Assessment/Plan   Assessment & Plan  Closed fracture of neck of right femur, initial encounter       4-year-old female, who is known to history of dementia, hypertension, hyperlipidemia, coronary artery disease, GERD, came to the emergency department after a mechanical fall.  Diagnosed with  closed fracture of the right neck of the femur, continue to control the pain, orthopedics is following, she had a right hip hemiarthroplasty done , she is postop now, doing okay,  Hypertension same  Hyperlipidemia same  History of coronary artery disease she has seen the cardiologist in November 2024 she is stable.  She was also evaluated by the cardiologist here,   PT OT and discharge plans  GERD same  DVT prophylaxis  Medically ready for discharge to a skilled nursing facility  I spent 25 minutes in the professional and overall care of this patient.    Anselmo Skinner MD

## 2024-12-29 NOTE — CARE PLAN
The patient's goals for the shift include      The clinical goals for the shift include Patient pain control taken      Problem: Pain - Adult  Goal: Verbalizes/displays adequate comfort level or baseline comfort level  Outcome: Progressing     Problem: Safety - Adult  Goal: Free from fall injury  Outcome: Progressing     Problem: Discharge Planning  Goal: Discharge to home or other facility with appropriate resources  Outcome: Progressing     Problem: Chronic Conditions and Co-morbidities  Goal: Patient's chronic conditions and co-morbidity symptoms are monitored and maintained or improved  Outcome: Progressing     Problem: Skin  Goal: Decreased wound size/increased tissue granulation at next dressing change  Outcome: Progressing  Goal: Participates in plan/prevention/treatment measures  Outcome: Progressing  Goal: Prevent/manage excess moisture  Outcome: Progressing  Goal: Prevent/minimize sheer/friction injuries  Outcome: Progressing  Goal: Promote/optimize nutrition  Outcome: Progressing  Goal: Promote skin healing  Outcome: Progressing     Problem: Bathing  Goal: LTG - Patient will utilize adaptive techniques to bathe body  Outcome: Progressing     Problem: Dressings Lower Extremities  Goal: LTG - Patient will utilize adaptive techniques/equipment to dress lower body with minimal assistance  Outcome: Progressing     Problem: Dressing Upper Extremities  Goal: STG - Patient dons shirt - Pullover or Button - while sitting edge of bed  Outcome: Progressing     Problem: Eating  Goal: STG - Patient will open all containers with supervision   Outcome: Progressing     Problem: Grooming  Goal: STG - Patient will tolerate standing at sink for grooming tasks with minimal assistance   Outcome: Progressing     Problem: Toileting  Goal: LTG - Patient will utilize adaptive techniques/equipment to complete daily toileting tasks  Outcome: Progressing

## 2024-12-29 NOTE — PROGRESS NOTES
12/29/24 1430   Discharge Planning   Type of Post Acute Facility Services Rehab   Expected Discharge Disposition Rehab  ([facility])   Does the patient need discharge transport arranged? Yes   RoundTrip coordination needed? Yes   Has discharge transport been arranged? No   What day is the transport expected? 12/30/24     [facility] can accept patient pending OT note.  OT note uploaded into [address].  ADOD: tomorrow  Meredith aRngel RN

## 2024-12-30 VITALS
BODY MASS INDEX: 18.78 KG/M2 | WEIGHT: 110 LBS | HEIGHT: 64 IN | RESPIRATION RATE: 18 BRPM | OXYGEN SATURATION: 96 % | TEMPERATURE: 97.9 F | DIASTOLIC BLOOD PRESSURE: 88 MMHG | HEART RATE: 80 BPM | SYSTOLIC BLOOD PRESSURE: 158 MMHG

## 2024-12-30 LAB
ANION GAP SERPL CALC-SCNC: 8 MMOL/L (ref 10–20)
BUN SERPL-MCNC: 19 MG/DL (ref 6–23)
CALCIUM SERPL-MCNC: 8.3 MG/DL (ref 8.6–10.3)
CHLORIDE SERPL-SCNC: 106 MMOL/L (ref 98–107)
CO2 SERPL-SCNC: 28 MMOL/L (ref 21–32)
CREAT SERPL-MCNC: 0.76 MG/DL (ref 0.5–1.05)
EGFRCR SERPLBLD CKD-EPI 2021: 76 ML/MIN/1.73M*2
ERYTHROCYTE [DISTWIDTH] IN BLOOD BY AUTOMATED COUNT: 14.4 % (ref 11.5–14.5)
GLUCOSE SERPL-MCNC: 95 MG/DL (ref 74–99)
HCT VFR BLD AUTO: 39.2 % (ref 36–46)
HGB BLD-MCNC: 12.4 G/DL (ref 12–16)
MCH RBC QN AUTO: 29.9 PG (ref 26–34)
MCHC RBC AUTO-ENTMCNC: 31.6 G/DL (ref 32–36)
MCV RBC AUTO: 95 FL (ref 80–100)
NRBC BLD-RTO: 0 /100 WBCS (ref 0–0)
PLATELET # BLD AUTO: 140 X10*3/UL (ref 150–450)
POTASSIUM SERPL-SCNC: 4.1 MMOL/L (ref 3.5–5.3)
RBC # BLD AUTO: 4.15 X10*6/UL (ref 4–5.2)
SODIUM SERPL-SCNC: 138 MMOL/L (ref 136–145)
WBC # BLD AUTO: 7.4 X10*3/UL (ref 4.4–11.3)

## 2024-12-30 PROCEDURE — 85027 COMPLETE CBC AUTOMATED: CPT | Performed by: INTERNAL MEDICINE

## 2024-12-30 PROCEDURE — 36415 COLL VENOUS BLD VENIPUNCTURE: CPT | Performed by: INTERNAL MEDICINE

## 2024-12-30 PROCEDURE — 2500000004 HC RX 250 GENERAL PHARMACY W/ HCPCS (ALT 636 FOR OP/ED): Performed by: STUDENT IN AN ORGANIZED HEALTH CARE EDUCATION/TRAINING PROGRAM

## 2024-12-30 PROCEDURE — 80048 BASIC METABOLIC PNL TOTAL CA: CPT | Performed by: INTERNAL MEDICINE

## 2024-12-30 PROCEDURE — 2500000001 HC RX 250 WO HCPCS SELF ADMINISTERED DRUGS (ALT 637 FOR MEDICARE OP): Performed by: STUDENT IN AN ORGANIZED HEALTH CARE EDUCATION/TRAINING PROGRAM

## 2024-12-30 PROCEDURE — 2500000002 HC RX 250 W HCPCS SELF ADMINISTERED DRUGS (ALT 637 FOR MEDICARE OP, ALT 636 FOR OP/ED): Performed by: STUDENT IN AN ORGANIZED HEALTH CARE EDUCATION/TRAINING PROGRAM

## 2024-12-30 PROCEDURE — 99232 SBSQ HOSP IP/OBS MODERATE 35: CPT | Performed by: INTERNAL MEDICINE

## 2024-12-30 RX ORDER — DOCUSATE SODIUM 100 MG/1
100 CAPSULE, LIQUID FILLED ORAL 2 TIMES DAILY
Start: 2024-12-30 | End: 2025-01-29

## 2024-12-30 RX ORDER — CYCLOBENZAPRINE HCL 5 MG
5 TABLET ORAL 3 TIMES DAILY PRN
Start: 2024-12-30 | End: 2025-01-04

## 2024-12-30 RX ORDER — OXYCODONE HYDROCHLORIDE 5 MG/1
5 TABLET ORAL EVERY 6 HOURS PRN
Qty: 15 TABLET | Refills: 0 | Status: SHIPPED | OUTPATIENT
Start: 2024-12-30

## 2024-12-30 RX ADMIN — ROSUVASTATIN 40 MG: 20 TABLET, FILM COATED ORAL at 08:00

## 2024-12-30 RX ADMIN — POLYETHYLENE GLYCOL 3350 17 G: 17 POWDER, FOR SOLUTION ORAL at 08:00

## 2024-12-30 RX ADMIN — DOCUSATE SODIUM 100 MG: 100 CAPSULE, LIQUID FILLED ORAL at 08:00

## 2024-12-30 RX ADMIN — ACETAMINOPHEN 650 MG: 325 TABLET, FILM COATED ORAL at 06:22

## 2024-12-30 RX ADMIN — CYCLOBENZAPRINE HYDROCHLORIDE 5 MG: 5 TABLET, FILM COATED ORAL at 11:54

## 2024-12-30 RX ADMIN — ASPIRIN 81 MG: 81 TABLET, COATED ORAL at 08:00

## 2024-12-30 RX ADMIN — ACETAMINOPHEN 650 MG: 325 TABLET, FILM COATED ORAL at 11:21

## 2024-12-30 RX ADMIN — CYCLOBENZAPRINE HYDROCHLORIDE 5 MG: 5 TABLET, FILM COATED ORAL at 06:22

## 2024-12-30 RX ADMIN — PANTOPRAZOLE SODIUM 40 MG: 40 TABLET, DELAYED RELEASE ORAL at 06:22

## 2024-12-30 ASSESSMENT — PAIN DESCRIPTION - ORIENTATION
ORIENTATION: RIGHT
ORIENTATION: RIGHT

## 2024-12-30 ASSESSMENT — PAIN DESCRIPTION - LOCATION
LOCATION: HIP
LOCATION: HIP

## 2024-12-30 ASSESSMENT — COGNITIVE AND FUNCTIONAL STATUS - GENERAL
MOBILITY SCORE: 18
EATING MEALS: A LITTLE
CLIMB 3 TO 5 STEPS WITH RAILING: A LITTLE
PERSONAL GROOMING: A LITTLE
TURNING FROM BACK TO SIDE WHILE IN FLAT BAD: A LITTLE
WALKING IN HOSPITAL ROOM: A LITTLE
MOVING FROM LYING ON BACK TO SITTING ON SIDE OF FLAT BED WITH BEDRAILS: A LITTLE
DRESSING REGULAR LOWER BODY CLOTHING: A LITTLE
HELP NEEDED FOR BATHING: A LITTLE
TOILETING: A LITTLE
DRESSING REGULAR UPPER BODY CLOTHING: A LITTLE
MOVING TO AND FROM BED TO CHAIR: A LITTLE
DAILY ACTIVITIY SCORE: 18
STANDING UP FROM CHAIR USING ARMS: A LITTLE

## 2024-12-30 ASSESSMENT — PAIN - FUNCTIONAL ASSESSMENT
PAIN_FUNCTIONAL_ASSESSMENT: 0-10

## 2024-12-30 ASSESSMENT — PAIN SCALES - GENERAL
PAINLEVEL_OUTOF10: 0 - NO PAIN
PAINLEVEL_OUTOF10: 3
PAINLEVEL_OUTOF10: 0 - NO PAIN
PAINLEVEL_OUTOF10: 8
PAINLEVEL_OUTOF10: 0 - NO PAIN

## 2024-12-30 NOTE — CARE PLAN
The patient's goals for the shift include      The clinical goals for the shift include Patient pain to be controlled for shift      Problem: Pain - Adult  Goal: Verbalizes/displays adequate comfort level or baseline comfort level  Outcome: Progressing     Problem: Safety - Adult  Goal: Free from fall injury  Outcome: Progressing     Problem: Discharge Planning  Goal: Discharge to home or other facility with appropriate resources  Outcome: Progressing     Problem: Chronic Conditions and Co-morbidities  Goal: Patient's chronic conditions and co-morbidity symptoms are monitored and maintained or improved  Outcome: Progressing     Problem: Skin  Goal: Decreased wound size/increased tissue granulation at next dressing change  Outcome: Progressing  Goal: Participates in plan/prevention/treatment measures  Outcome: Progressing  Goal: Prevent/manage excess moisture  Outcome: Progressing  Goal: Prevent/minimize sheer/friction injuries  Outcome: Progressing  Goal: Promote/optimize nutrition  Outcome: Progressing  Goal: Promote skin healing  Outcome: Progressing     Problem: Bathing  Goal: LTG - Patient will utilize adaptive techniques to bathe body  Outcome: Progressing     Problem: Dressings Lower Extremities  Goal: LTG - Patient will utilize adaptive techniques/equipment to dress lower body with minimal assistance  Outcome: Progressing     Problem: Dressing Upper Extremities  Goal: STG - Patient dons shirt - Pullover or Button - while sitting edge of bed  Outcome: Progressing     Problem: Eating  Goal: STG - Patient will open all containers with supervision   Outcome: Progressing     Problem: Grooming  Goal: STG - Patient will tolerate standing at sink for grooming tasks with minimal assistance   Outcome: Progressing     Problem: Toileting  Goal: LTG - Patient will utilize adaptive techniques/equipment to complete daily toileting tasks  Outcome: Progressing

## 2024-12-30 NOTE — CARE PLAN
The patient's goals for the shift include      The clinical goals for the shift include patient will remain safe and ring for assistance this shift      Problem: Pain - Adult  Goal: Verbalizes/displays adequate comfort level or baseline comfort level  Outcome: Progressing     Problem: Discharge Planning  Goal: Discharge to home or other facility with appropriate resources  Outcome: Progressing     Problem: Skin  Goal: Decreased wound size/increased tissue granulation at next dressing change  Outcome: Progressing  Goal: Participates in plan/prevention/treatment measures  Outcome: Progressing  Goal: Prevent/manage excess moisture  Outcome: Progressing  Goal: Prevent/minimize sheer/friction injuries  Outcome: Progressing  Goal: Promote/optimize nutrition  Outcome: Progressing  Goal: Promote skin healing  Outcome: Progressing     Problem: Safety - Adult  Goal: Free from fall injury  Outcome: Met

## 2024-12-30 NOTE — PROGRESS NOTES
12/30/24 1223   Discharge Planning   Who is requesting discharge planning? Provider   Home or Post Acute Services Post acute facilities (Rehab/SNF/etc)   Type of Post Acute Facility Services Rehab   Expected Discharge Disposition Rehab   Does the patient need discharge transport arranged? Yes   RoundTrip coordination needed? Yes   Has discharge transport been arranged? No   Intensity of Service   Intensity of Service 0-30 min     12/30/24 1223  Patient cleared for discharge to [facility].  Report number [phone] or [phone].   time 1515.  Patient aware.  Bedside nurse, charge nurse, and  notified. SW to update family.  Shantel Fletcher RN TCC    12/30/24 1430  Spoke with daughter Donna at bedside and updated her on discharge.  Shantel Fletcher RN TCC

## 2024-12-30 NOTE — PROGRESS NOTES
Subjective Data:  Patient seen and examined, chart reviewed  -- No acute issues reported, recorded on perioperative course from a cardiac standpoint  -- No complaints offered.     NO Cardiac Telemetry      Objective Data:  Last Recorded Vitals:  Vitals:    12/29/24 2109 12/30/24 0028 12/30/24 0400 12/30/24 0825   BP: 136/74 119/67 146/73 152/65   BP Location:    Left arm   Patient Position:    Lying   Pulse: 81 77 78 79   Resp: 16 16 16 15   Temp: 36.4 °C (97.5 °F) 36.3 °C (97.4 °F) 36.9 °C (98.4 °F) 36.8 °C (98.2 °F)   TempSrc:    Temporal   SpO2: 92% 94% 95% 96%   Weight:       Height:           Last Labs:  Results from last 7 days   Lab Units 12/30/24  0720 12/29/24  0601 12/28/24  0602   WBC AUTO x10*3/uL 7.4 8.6 11.6*   HEMOGLOBIN g/dL 12.4 11.7* 12.7   HEMATOCRIT % 39.2 35.3* 36.5   PLATELETS AUTO x10*3/uL 140* 149* 151     Results from last 7 days   Lab Units 12/30/24  0720 12/29/24  0601 12/28/24  0602 12/27/24  0541 12/26/24  1521 12/25/24  0648   SODIUM mmol/L 138 139 139   < > 142 138   POTASSIUM mmol/L 4.1 4.0 4.2   < > 3.8 3.7   CHLORIDE mmol/L 106 107 106   < > 108* 105   CO2 mmol/L 28 28 27   < > 25 26   BUN mg/dL 19 25* 20   < > 23 28*   CREATININE mg/dL 0.76 0.92 0.81   < > 1.01 0.89   CALCIUM mg/dL 8.3* 8.4* 8.4*   < > 8.8 9.3   PROTEIN TOTAL g/dL  --   --   --   --  6.0* 6.4   BILIRUBIN TOTAL mg/dL  --   --   --   --  0.5 0.5   ALK PHOS U/L  --   --   --   --  80 78   ALT U/L  --   --   --   --  21 16   AST U/L  --   --   --   --  25 21   GLUCOSE mg/dL 95 116* 119*   < > 114* 136*    < > = values in this interval not displayed.       TROPHS   Date/Time Value Ref Range Status   05/12/2024 04:23 AM 7 0 - 13 ng/L Final   05/12/2024 03:16 AM 7 0 - 13 ng/L Final     BNP   Date/Time Value Ref Range Status   01/06/2019 07:40 PM 15 0 - 99 pg/mL Final     Comment:     .  <100 pg/mL - Heart failure unlikely  100-299 pg/mL - Intermediate probability of acute heart  .               failure exacerbation.  Correlate with clinical  .               context and patient history.    >=300 pg/mL - Heart Failure likely. Correlate with clinical  .               context and patient history.  BNP testing is performed using different testing   methodology at [facility] than at other   [facility] hospitals. Direct result comparisons should   only be made within the same method.       VLDL   Date/Time Value Ref Range Status   11/23/2022 02:15 PM 25 0 - 40 mg/dL Final      Last I/O:  I/O last 3 completed shifts:  In: - (0 mL/kg)   Out: 1050 (21 mL/kg) [Urine:1050 (0.6 mL/kg/hr)]  Weight: 49.9 kg     Past Cardiology Tests (Last 3 Years):  EKG:  EKG 12/27/2024 @ 10:14       ECG 12 lead 05/12/2024  E  Echo:  Transthoracic Echo (TTE) Limited 05/12/2024  CONCLUSIONS:   1. Left ventricular systolic function is normal with a 55-60% estimated ejection fraction.   2. Spectral Doppler shows an impaired relaxation pattern of left ventricular diastolic filling.   3. Mild to moderate mitral valve regurgitation.   4. Mild to moderate aortic valve regurgitation.   5. RVSP within normal limits.            Inpatient Medications:  Scheduled medications   Medication Dose Route Frequency    aspirin  81 mg oral BID    docusate sodium  100 mg oral BID    pantoprazole  40 mg oral Daily before breakfast    polyethylene glycol  17 g oral Daily    rosuvastatin  40 mg oral Daily     PRN medications   Medication    acetaminophen    bisacodyl    bisacodyl    cyclobenzaprine    HYDROmorphone    naloxone    ondansetron    Or    ondansetron    oxyCODONE    oxyCODONE     Continuous Medications   Medication Dose Last Rate    oxygen  2 L/min         Physical Exam:  Documented Vital Signs   Heart Rate:  [63-81]   Temp:  [36.3 °C (97.3 °F)-36.9 °C (98.4 °F)]   Resp:  [15-16]   BP: (109-152)/(65-74)   SpO2:  [92 %-96 %]   Temp:  [36.3 °C (97.3 °F)-36.9 °C (98.4 °F)] 36.8 °C (98.2 °F)  Heart Rate:  [63-81] 79  Resp:  [15-16] 15  BP: (109-152)/(65-74)  "152/65      Oxygen Dose: *2 L/min    Documented Fluid Status     Intake/Output Summary (Last 24 hours) at 12/30/2024 0906  Last data filed at 12/30/2024 0825  Gross per 24 hour   Intake 360 ml   Output 1050 ml   Net -690 ml     Net IO Since Admission: -78 mL [12/30/24 0906]       Assessment/Plan   Sunitha Randle is a 84 y.o. female  who has a pertinent medical Hx notable for CABG ( 1998 --> [place] --> LAD / SHILOH --> RCA,, fRAD--> Diag / Lcx ) aortic regurgitation by prior echocardiogram history of essential hypertension, dyslipidemia who presented to the ER with fall and subsequenbt impacted fracture of the Right femoral neck. Cardiology has been consulted for \"Clearance for surgery\"  She is known to have multivessel coronary artery disease that was revascularized in the 90s.  She has not had any further chest heaviness pain or anginal symptoms in the interim time. Her EKG obtained today and personally reviewed shows sinus rhythm with occasional PVCs but no ischemia.  Her echocardiogram from May 2024 shows an ejection fraction of 55 to 60% with impaired relaxation diastolic filling pattern.  RV function is normal.  There is noted to be mild to moderate mitral and tricuspid regurgitation with an RVSP of roughly 27 mmHg.    Tolerated R hip hemiarthroplasty without apparent complications      --Recommend continuing aspirin in the perioperative course for coronary artery disease protection standpoint    -- Resume chronic cardiac medications     -- Disposition planning as per primary service  ++ NO additional cardiac testing is currently planned   ++ NO cardiac barriers to discharge                      Peripheral IV 12/26/24 20 G Right;Anterior Forearm (Active)   Site Assessment Clean;Dry;Intact 12/27/24 1900   Dressing Type Transparent 12/27/24 1900   Line Status Flushed 12/27/24 1900   Dressing Status Clean;Dry;Occlusive 12/27/24 1900   Number of days: 2       Code Status:  Full Code      Mg Hernandez DO   Division of " Cardiovascular Medicine  [facility]

## 2024-12-30 NOTE — DISCHARGE SUMMARY
Discharge Diagnosis  Closed fracture of neck of right femur, initial encounter, right hip hemiarthroplasty done    Issues Requiring Follow-Up  pcp    Discharge Meds     Medication List      START taking these medications     cyclobenzaprine 5 mg tablet; Commonly known as: Flexeril; Take 1 tablet   (5 mg) by mouth 3 times a day as needed for muscle spasms for up to 5   days.   docusate sodium 100 mg capsule; Commonly known as: Colace; Take 1   capsule (100 mg) by mouth 2 times a day.   oxyCODONE 5 mg immediate release tablet; Commonly known as: Roxicodone;   Take 1 tablet (5 mg) by mouth every 6 hours if needed for moderate pain (4   - 6) or severe pain (7 - 10).     CHANGE how you take these medications     aspirin 81 mg EC tablet; Take 1 tablet (81 mg) by mouth 2 times a day.;   What changed: when to take this     CONTINUE taking these medications     MONIKA-CITRATE ORAL   coenzyme Q-10 100 mg capsule   estradiol 0.01 % (0.1 mg/gram) vaginal cream; Commonly known as:   Estrace; Use a 1/2 gram in the vagina twice weekly at bedtime   FISH OIL ORAL   GLUCOSAMINE-CHONDROITIN ORAL   losartan 25 mg tablet; Commonly known as: Cozaar   MAGNESIUM-POTASSIUM ORAL   multivitamin with minerals tablet   ondansetron 8 mg tablet; Commonly known as: Zofran; Take 1 tablet (8 mg)   by mouth every 8 hours if needed for nausea or vomiting for up to 15   doses.   pantoprazole 40 mg EC tablet; Commonly known as: ProtoNix; Take 1 tablet   (40 mg) by mouth once daily in the morning. Take before meals. Do not   crush, chew, or split.   rosuvastatin 40 mg tablet; Commonly known as: Crestor; Take 1 tablet (40   mg) by mouth once daily.   valACYclovir 1 gram tablet; Commonly known as: Valtrex     STOP taking these medications     donepezil 10 mg tablet; Commonly known as: Aricept   loperamide 2 mg capsule; Commonly known as: Imodium       Test Results Pending At Discharge  Pending Labs       No current pending labs.            Hospital Course    4-year-old female, she came to the emergency department, after mechanical fall, she was diagnosed with right neck femur fracture, seen by the orthopedics, she had a right hip hemiarthroplasty done, she was sent to skilled nursing facility.  She also has history of coronary artery disease, she was also followed by cardiologist.  Hypertension continue the same.  Hyperlipidemia same  Continue the DVT prophylaxis    Pertinent Physical Exam At Time of Discharge  Physical Exam  Alert oriented into 3.  HEENT unremarkable.  Neck no JVD.  Heart S1-S2.  Lungs reduced air entry.  Abdomen is soft.  Legs status post surgery.    Outpatient Follow-Up  Future Appointments   Date Time Provider Department Center   1/30/2025  3:30 PM BLANQUITA Stone-CNP [id number] [place]   3/10/2025 10:45 AM Daniel Love MD [id number] [place]   5/7/2025  1:45 PM Mamadou Barrett MD [id number] [place]     Discharge timing more than 35 minutes    Anselmo Skinner MD

## 2024-12-31 ENCOUNTER — LAB REQUISITION (OUTPATIENT)
Dept: LAB | Facility: HOSPITAL | Age: 84
End: 2024-12-31
Payer: MEDICARE

## 2025-01-03 ENCOUNTER — TELEPHONE (OUTPATIENT)
Dept: OBSTETRICS AND GYNECOLOGY | Facility: CLINIC | Age: 85
End: 2025-01-03
Payer: MEDICARE

## 2025-01-06 NOTE — TELEPHONE ENCOUNTER
Retrieved voice mail from daughter off triage line stating they are going to cancel 1/30/25 appt with guille regarding pessary care.  Daughter stating they are leading toward surgery

## 2025-01-11 ENCOUNTER — LAB REQUISITION (OUTPATIENT)
Dept: LAB | Facility: HOSPITAL | Age: 85
End: 2025-01-11
Payer: MEDICARE

## 2025-01-14 ENCOUNTER — TELEPHONE (OUTPATIENT)
Dept: REHABILITATION | Facility: REHABILITATION | Age: 85
End: 2025-01-14
Payer: MEDICARE

## 2025-01-14 DIAGNOSIS — N39.0 UTI (URINARY TRACT INFECTION) DUE TO ENTEROCOCCUS: Primary | ICD-10-CM

## 2025-01-14 DIAGNOSIS — B95.2 UTI (URINARY TRACT INFECTION) DUE TO ENTEROCOCCUS: Primary | ICD-10-CM

## 2025-01-14 RX ORDER — NITROFURANTOIN 25; 75 MG/1; MG/1
100 CAPSULE ORAL 2 TIMES DAILY
Qty: 14 CAPSULE | Refills: 0 | Status: SHIPPED | OUTPATIENT
Start: 2025-01-14 | End: 2025-01-21

## 2025-01-14 NOTE — TELEPHONE ENCOUNTER
Patient urinalysis results positive. Prescription sent for Macrobid. Charge Nurse at [facility] updated the patient's daughter to the plan of care.

## 2025-01-16 DIAGNOSIS — I10 ESSENTIAL HYPERTENSION, BENIGN: Primary | ICD-10-CM

## 2025-01-16 RX ORDER — LOSARTAN POTASSIUM 25 MG/1
TABLET ORAL
Qty: 31 TABLET | Refills: 11 | Status: SHIPPED | OUTPATIENT
Start: 2025-01-16

## 2025-01-24 ENCOUNTER — TELEPHONE (OUTPATIENT)
Dept: UROLOGY | Facility: HOSPITAL | Age: 85
End: 2025-01-24
Payer: MEDICARE

## 2025-01-24 DIAGNOSIS — R35.0 URINARY FREQUENCY: Primary | ICD-10-CM

## 2025-01-27 NOTE — PROGRESS NOTES
GUSTAVO Styles, PAAngeloC  Division of Adult Reconstruction  Phone: [phone]  Fax: [phone]          HPI:  Diagnosis: Comminuted fracture of the femoral neck   Procedure Performed: right Hip Hemiarthroplasty   Date of Surgery: December 27th, 2024    Kimberly Randle is a pleasant 84 y.o. year-old female here for regularly scheduled follow-up of their right hip hemiarthroplasty by Dr. Monsalve. The patient is approximately 4 weeks postop. She resides at [facility]. Pt is accompanied by her daughter who is an occupational therapist. Her daughter states she has not been receiving sufficient medications at the facility for her pain. She states the Oxycodone was causing side effects so they discontinued that and the Flexeril. The patient has no mechanical symptoms. The patient has no swelling and moderate, severe pain. The patient is using Tylenol for pain control. Daughter also states that her muscles have been very tight and contracted. The patient has been compliant with JEREMÍAS compression stockings as prescribed. They have been working with PT/OT at their facility. The patient is ambulatory with a walker. The patient has been compliant with their prescribed ASA for VTE prophylaxis. The patient has had no interval fall or trauma. The patient's wound has healed uneventfully. The patient denies: fevers, chills, incisional drainage, joint instability, chest or calf pain, shortness of breath, and night sweats.  There are no complaints of numbness or tingling in the operative extremity. No complications postoperatively.        Review of systems  There has been no interval change in this patient's past medical, surgical, medications, allergies, family history or social history since the most recent visit to a provider within our department. 14 point review of systems was performed, reviewed, and negative except for pertinent positives documented in the history of present illness.      Past Medical History:   Diagnosis Date    Personal history of other diseases of the circulatory system     History of coronary artery disease    Personal history of other diseases of the circulatory system     History of cardiac disorder    Personal history of other diseases of the circulatory system 12/06/2018    History of coronary artery disease    Personal history of other endocrine, nutritional and metabolic disease     History of hyperlipidemia    Personal history of other specified conditions     History of palpitations    Personal history of transient ischemic attack (TIA), and cerebral infarction without residual deficits     History of transient cerebral ischemia     Patient Active Problem List   Diagnosis    Abnormal echocardiogram    Cervicalgia    Coronary artery disease    Essential hypertension, benign    H/O four vessel coronary artery bypass graft    Hyperlipidemia    Pelvic organ prolapse quantification stage 2 cystocele    Premature atrial beats    Urinary frequency    Vaginal atrophy    Vaginal vault prolapse    Dementia    Atypical chest pain    Anxiety    Vomiting, unspecified vomiting type, unspecified whether nausea present    Closed fracture of neck of right femur, initial encounter     Medication Documentation Review Audit       Reviewed by Yohana Alexander RN (Registered Nurse) on 12/27/24 at 1501      Medication Order Taking? Sig Documenting Provider Last Dose Status   aspirin 81 mg EC tablet [id number]  Take 1 tablet (81 mg) by mouth once daily. Historical Provider, MD  Active   calcium citrate/vitamin D2 (MONIKA-CITRATE ORAL) [id number]  Take 1 tablet by mouth once daily. Historical Provider, MD  Active   coenzyme Q-10 100 mg capsule [id number]  Take 1 capsule (100 mg) by mouth once daily. Historical Provider, MD  Active   docosahexaenoic acid/epa (FISH OIL ORAL) [id number]  Take 1 capsule by mouth once daily. Historical Provider, MD  Active   donepezil (Aricept) 10 mg tablet [id number]  Take  1 tablet (10 mg) by mouth once daily at bedtime. Daniel Love MD  Active   estradiol (Estrace) 0.01 % (0.1 mg/gram) vaginal cream [id number]  Use a 1/2 gram in the vagina twice weekly at bedtime   Patient taking differently: if needed (prior to planed procedure). Use a 1/2 gram in the vagina twice weekly at bedtime    Kimberly Butterfieldersic, APRN-CNP  Active   glucosamine HCl/chondroitin grove (GLUCOSAMINE-CHONDROITIN ORAL) [id number]  Take 1 capsule by mouth once daily. Historical Provider, MD  Active   loperamide (Imodium) 2 mg capsule [id number]  Take 1 capsule (2 mg) by mouth 4 times a day as needed for diarrhea for up to 3 days. Jonathan Hartmann MD  Active   losartan (Cozaar) 25 mg tablet [id number]  Take 1 tablet (25 mg) by mouth once daily. Historical Provider, MD  Active   multivitamin with minerals tablet [id number]  Take 1 tablet by mouth once daily. Historical Provider, MD  Active   ondansetron (Zofran) 8 mg tablet   Take 1 tablet (8 mg) by mouth every 8 hours if needed for nausea or vomiting for up to 15 doses. Jonathan Hartmann MD  Active   pantoprazole (ProtoNix) 40 mg EC tablet [id number] Yes Take 1 tablet (40 mg) by mouth once daily in the morning. Take before meals. Do not crush, chew, or split. Gabriel Bolanos MD 2024  24 2359   potassium/magnesium (MAGNESIUM-POTASSIUM ORAL) [id number]  Take 1 capsule by mouth once daily. Historical Provider, MD  Active   rosuvastatin (Crestor) 40 mg tablet   Take 1 tablet (40 mg) by mouth once daily. Mamadou Barrett MD  Active   valACYclovir (Valtrex) 1 gram tablet [id number]  TAKE TWO TABLETS BY MOUTH TWO TIMES A DAY FOR TWO DOSES AS NEEDED FOR COLD SORE Historical Provider, MD  Active                  Allergies   Allergen Reactions    Atorvastatin GI Upset, Confusion and Other    Donepezil Other     Severe nausea, vomiting, and diarrhea per daughter Donna    Amoxicillin Unknown    Codeine Unknown     Social History     Socioeconomic  History    Marital status:      Spouse name: Not on file    Number of children: Not on file    Years of education: Not on file    Highest education level: Not on file   Occupational History    Not on file   Tobacco Use    Smoking status: Former     Current packs/day: 1.50     Types: Cigarettes     Passive exposure: Past    Smokeless tobacco: Never   Vaping Use    Vaping status: Never Used   Substance and Sexual Activity    Alcohol use: Not Currently    Drug use: Never    Sexual activity: Not on file   Other Topics Concern    Not on file   Social History Narrative    Not on file     Social Drivers of Health     Financial Resource Strain: Low Risk  (12/27/2024)    Overall Financial Resource Strain (CARDIA)     Difficulty of Paying Living Expenses: Not hard at all   Food Insecurity: No Food Insecurity (12/27/2024)    Hunger Vital Sign     Worried About Running Out of Food in the Last Year: Never true     Ran Out of Food in the Last Year: Never true   Transportation Needs: No Transportation Needs (12/27/2024)    PRAPARE - Transportation     Lack of Transportation (Medical): No     Lack of Transportation (Non-Medical): No   Physical Activity: Insufficiently Active (6/17/2024)    Received from [employer]    Exercise Vital Sign     Days of Exercise per Week: 3 days     Minutes of Exercise per Session: 20 min   Stress: Stress Concern Present (6/17/2024)    Received from [employer]    [employer] - Occupational Stress Questionnaire     Feeling of Stress : To some extent   Social Connections: Unknown (12/27/2024)    Social Connection and Isolation Panel [NHANES]     Frequency of Communication with Friends and Family: Not on file     Frequency of Social Gatherings with Friends and Family: Not on file     Attends [employer] Services: Not on file     Active Member of Clubs or Organizations: Not on file     Attends Club or Organization Meetings: Not on file      Marital Status:    Intimate Partner Violence: Not At Risk (12/26/2024)    Humiliation, Afraid, Rape, and Kick questionnaire     Fear of Current or Ex-Partner: No     Emotionally Abused: No     Physically Abused: No     Sexually Abused: No   Housing Stability: Low Risk  (12/27/2024)    Housing Stability Vital Sign     Unable to Pay for Housing in the Last Year: No     Number of Times Moved in the Last Year: 0     Homeless in the Last Year: No     Past Surgical History:   Procedure Laterality Date    CORONARY ARTERY BYPASS GRAFT  04/04/2018    CABG    OTHER SURGICAL HISTORY  04/27/2018    Surgery    OTHER SURGICAL HISTORY  12/03/2020    Hysterectomy    OTHER SURGICAL HISTORY  12/03/2020    Lumpectomy       Physical Exam:  General: Well-appearing female is alert and oriented x 3 and appears comfortable. NAD. Pleasant and cooperative.  Mood: Euthymic  Respirations: Non-labored  Gait: Slight antalgic  Assistive Device: walker. Coordination and balance intact.    right Hip  No other overlying lesion.  Wound: Incision is healing well with no signs of surrounding infection, erythema, fluctuance, dehiscence, drainage, or suture abscess. There is mild warmth and effusion about the hip, both consistent with the normal post-operative healing.   Range of motion: Surgical hip demonstrates painless free ROM through short arcs of motion.  Tenderness: None  Knee: Painless ROM of the knee.   Calf: No swelling, warmth, or tenderness. Lower extremity is well perfused.  Able to dorsi-flex and plantar-flex the ankle with appropriate strength. Able to wiggle all toes.   Neurovascular exam is at baseline.  The foot is warm and well perfused with palpable DP pulse.  Sensation is intact to light touch.     Imaging:  Radiographs of the operative hip were independently obtained and reviewed in clinic. The implant is well fixed and well aligned. No radiographic evidence of divina-implant fracture, lucency or dislocation. Compared to  immediate post-operative x-rays, no change in appearance. Leg lengths closely restored.    Assesment/Plan:  Kimberly Randle is doing well and progressing well approximately 4 weeks s/p right hip hemiarthroplasty. I am satisfied with the patient's recovery to this point.     A thorough discussion was had with the patient concerning the postoperative course and the patient is in agreement with the plan.  I discussed with the patient activity precautions and and dislocation precautions in detail which include avoidance of hip flexion >90 degrees with simultaneous internal rotation, twisting, and heavy lifting. These hip precautions will remain in place for a total of 3 months. Patient can progress activities as tolerated. At this time, you may gradually increase your activities and get back to a normal, low-impact lifestyle. Patient should also limit running, jumping, and repetitive activity. The patient verbalizes understanding of these precautions.  Prescribed a prescription of Flexeril 10 mg to take nightly before bed for the muscle tightness and stiffness. Patient understands she needs to space this out 3 hours from the tramadol.  Continued PT/OT at the facility, per protocol to improve strength.  Start Tylenol 1,000 mg TID and Tramadol 50 mg 1-2 tablets every 6 hours as needed for severe pain. We discussed strategies to wean off of opioid medications as tolerated. Transition to tylenol. Can incorporate NSAIDs if they have no prior contraindications after completion of prophylactic anticoagulation. Due to the nature of the surgery and the necessary post-operative rehabilitation, the patient will require more than 30 morphine MEQ per day for 6 days. The patient occasionally patient rates his pain a 9 or 10 on the pain scale. I have personally reviewed the OARRS report for this patient. This report is scanned into the electronic medical record. I have considered the risks of abuse, dependence, addiction and  diversion.   No swimming or tub bathing until 3 months post op.  Do not visit dentist until 3 months after surgery unless it is an emergency. Reviewed the need for prophylactic antibiotics prior to any dental or other invasive procedures. Patient understands that their dentist or myself may prescribe.    All questions were answered.    Follow-up in 9 weeks with radiographs or sooner if there are any concerns.     GUSTAVO Rae, PA-C  Orthopedic Physician Assistant  Division of Adult Reconstruction  Department of Orthopaedics  [place] [address]

## 2025-01-28 ENCOUNTER — OFFICE VISIT (OUTPATIENT)
Dept: ORTHOPEDIC SURGERY | Facility: CLINIC | Age: 85
End: 2025-01-28
Payer: MEDICARE

## 2025-01-28 ENCOUNTER — HOSPITAL ENCOUNTER (OUTPATIENT)
Dept: RADIOLOGY | Facility: CLINIC | Age: 85
Discharge: HOME | End: 2025-01-28
Payer: MEDICARE

## 2025-01-28 DIAGNOSIS — Z96.641 HISTORY OF RIGHT HIP HEMIARTHROPLASTY: Primary | ICD-10-CM

## 2025-01-28 DIAGNOSIS — Z96.641 HISTORY OF RIGHT HIP HEMIARTHROPLASTY: ICD-10-CM

## 2025-01-28 DIAGNOSIS — S72.001A CLOSED FRACTURE OF NECK OF RIGHT FEMUR, INITIAL ENCOUNTER: ICD-10-CM

## 2025-01-28 PROCEDURE — 73502 X-RAY EXAM HIP UNI 2-3 VIEWS: CPT | Mod: RIGHT SIDE | Performed by: RADIOLOGY

## 2025-01-28 PROCEDURE — 99211 OFF/OP EST MAY X REQ PHY/QHP: CPT

## 2025-01-28 PROCEDURE — 73502 X-RAY EXAM HIP UNI 2-3 VIEWS: CPT | Mod: RT

## 2025-01-28 RX ORDER — ACETAMINOPHEN 500 MG
1000 TABLET ORAL EVERY 8 HOURS
Qty: 60 TABLET | Refills: 1 | Status: SHIPPED | OUTPATIENT
Start: 2025-01-28 | End: 2025-02-17

## 2025-01-28 RX ORDER — TRAMADOL HYDROCHLORIDE 50 MG/1
50-100 TABLET ORAL EVERY 6 HOURS PRN
Qty: 40 TABLET | Refills: 0 | Status: SHIPPED | OUTPATIENT
Start: 2025-01-28 | End: 2025-02-04

## 2025-01-28 RX ORDER — CYCLOBENZAPRINE HCL 10 MG
10 TABLET ORAL NIGHTLY
Qty: 14 TABLET | Refills: 0 | Status: SHIPPED | OUTPATIENT
Start: 2025-01-28 | End: 2025-02-11

## 2025-01-30 ENCOUNTER — DOCUMENTATION (OUTPATIENT)
Dept: OBSTETRICS AND GYNECOLOGY | Facility: CLINIC | Age: 85
End: 2025-01-30
Payer: MEDICARE

## 2025-01-30 ENCOUNTER — APPOINTMENT (OUTPATIENT)
Dept: OBSTETRICS AND GYNECOLOGY | Facility: CLINIC | Age: 85
End: 2025-01-30
Payer: MEDICARE

## 2025-01-30 DIAGNOSIS — N39.0 RECURRENT UTI: Primary | ICD-10-CM

## 2025-01-30 RX ORDER — SULFAMETHOXAZOLE AND TRIMETHOPRIM 800; 160 MG/1; MG/1
1 TABLET ORAL 2 TIMES DAILY
Qty: 14 TABLET | Refills: 0 | Status: SHIPPED | OUTPATIENT
Start: 2025-01-30 | End: 2025-02-06

## 2025-01-31 ENCOUNTER — TELEPHONE (OUTPATIENT)
Dept: OBSTETRICS AND GYNECOLOGY | Facility: CLINIC | Age: 85
End: 2025-01-31
Payer: MEDICARE

## 2025-01-31 ENCOUNTER — LAB REQUISITION (OUTPATIENT)
Dept: LAB | Facility: HOSPITAL | Age: 85
End: 2025-01-31
Payer: MEDICARE

## 2025-01-31 LAB
APPEARANCE UR: ABNORMAL
BACTERIA #/AREA URNS AUTO: ABNORMAL /HPF
BILIRUB UR STRIP.AUTO-MCNC: NEGATIVE MG/DL
COLOR UR: YELLOW
GLUCOSE UR STRIP.AUTO-MCNC: NORMAL MG/DL
KETONES UR STRIP.AUTO-MCNC: NEGATIVE MG/DL
LEUKOCYTE ESTERASE UR QL STRIP.AUTO: ABNORMAL
MUCOUS THREADS #/AREA URNS AUTO: ABNORMAL /LPF
NITRITE UR QL STRIP.AUTO: ABNORMAL
PH UR STRIP.AUTO: 5.5 [PH]
PROT UR STRIP.AUTO-MCNC: ABNORMAL MG/DL
RBC # UR STRIP.AUTO: NEGATIVE /UL
RBC #/AREA URNS AUTO: ABNORMAL /HPF
SP GR UR STRIP.AUTO: 1.03
SQUAMOUS #/AREA URNS AUTO: ABNORMAL /HPF
UROBILINOGEN UR STRIP.AUTO-MCNC: NORMAL MG/DL
WBC #/AREA URNS AUTO: >50 /HPF
WBC CLUMPS #/AREA URNS AUTO: ABNORMAL /HPF

## 2025-01-31 PROCEDURE — 87086 URINE CULTURE/COLONY COUNT: CPT | Mod: OUT,AHULAB | Performed by: STUDENT IN AN ORGANIZED HEALTH CARE EDUCATION/TRAINING PROGRAM

## 2025-01-31 PROCEDURE — 81001 URINALYSIS AUTO W/SCOPE: CPT | Mod: OUT | Performed by: STUDENT IN AN ORGANIZED HEALTH CARE EDUCATION/TRAINING PROGRAM

## 2025-02-01 LAB — HOLD SPECIMEN: NORMAL

## 2025-02-02 LAB — BACTERIA UR CULT: ABNORMAL

## 2025-02-03 DIAGNOSIS — N39.0 ACUTE UTI: Primary | ICD-10-CM

## 2025-02-03 LAB — BACTERIA UR CULT: ABNORMAL

## 2025-02-03 RX ORDER — NITROFURANTOIN 25; 75 MG/1; MG/1
100 CAPSULE ORAL 2 TIMES DAILY
Qty: 10 CAPSULE | Refills: 0 | Status: ON HOLD | OUTPATIENT
Start: 2025-02-03 | End: 2025-02-08

## 2025-02-03 NOTE — TELEPHONE ENCOUNTER
Daughter (hudson)  contacted.   Discussed Virtual visit with samuel SEGUNDO to have w/o mom present, however, knows dr baker will need to perform exam on pt prior to surgery.  Daughter decided to make in person appt.  Transferred to  to schedule

## 2025-02-04 ENCOUNTER — APPOINTMENT (OUTPATIENT)
Dept: RADIOLOGY | Facility: HOSPITAL | Age: 85
End: 2025-02-04
Payer: MEDICARE

## 2025-02-04 ENCOUNTER — APPOINTMENT (OUTPATIENT)
Dept: CARDIOLOGY | Facility: HOSPITAL | Age: 85
End: 2025-02-04
Payer: MEDICARE

## 2025-02-04 ENCOUNTER — HOSPITAL ENCOUNTER (INPATIENT)
Facility: HOSPITAL | Age: 85
End: 2025-02-04
Attending: EMERGENCY MEDICINE | Admitting: STUDENT IN AN ORGANIZED HEALTH CARE EDUCATION/TRAINING PROGRAM
Payer: MEDICARE
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
## 2025-02-04 DIAGNOSIS — F03.B0 MODERATE DEMENTIA, UNSPECIFIED DEMENTIA TYPE, UNSPECIFIED WHETHER BEHAVIORAL, PSYCHOTIC, OR MOOD DISTURBANCE OR ANXIETY: ICD-10-CM

## 2025-02-04 DIAGNOSIS — K59.04 CHRONIC IDIOPATHIC CONSTIPATION: ICD-10-CM

## 2025-02-04 DIAGNOSIS — G93.40 ACUTE ENCEPHALOPATHY: ICD-10-CM
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
## 2025-02-04 DIAGNOSIS — N39.0 ACUTE LOWER UTI: ICD-10-CM

## 2025-02-04 DIAGNOSIS — N95.2 VAGINAL ATROPHY: ICD-10-CM

## 2025-02-04 LAB
ALBUMIN SERPL BCP-MCNC: 3.6 G/DL (ref 3.4–5)
ALP SERPL-CCNC: 108 U/L (ref 33–136)
ALT SERPL W P-5'-P-CCNC: 19 U/L (ref 7–45)
ANION GAP SERPL CALC-SCNC: 12 MMOL/L (ref 10–20)
APPEARANCE UR: CLEAR
AST SERPL W P-5'-P-CCNC: 25 U/L (ref 9–39)
BASOPHILS # BLD AUTO: 0.04 X10*3/UL (ref 0–0.1)
BASOPHILS NFR BLD AUTO: 0.3 %
BILIRUB DIRECT SERPL-MCNC: 0.1 MG/DL (ref 0–0.3)
BILIRUB SERPL-MCNC: 0.5 MG/DL (ref 0–1.2)
BILIRUB UR STRIP.AUTO-MCNC: NEGATIVE MG/DL
BUN SERPL-MCNC: 20 MG/DL (ref 6–23)
CALCIUM SERPL-MCNC: 8.9 MG/DL (ref 8.6–10.3)
CHLORIDE SERPL-SCNC: 102 MMOL/L (ref 98–107)
CO2 SERPL-SCNC: 23 MMOL/L (ref 21–32)
COLOR UR: YELLOW
CREAT SERPL-MCNC: 0.95 MG/DL (ref 0.5–1.05)
EGFRCR SERPLBLD CKD-EPI 2021: 58 ML/MIN/1.73M*2
EOSINOPHIL # BLD AUTO: 0.02 X10*3/UL (ref 0–0.4)
EOSINOPHIL NFR BLD AUTO: 0.2 %
ERYTHROCYTE [DISTWIDTH] IN BLOOD BY AUTOMATED COUNT: 15.1 % (ref 11.5–14.5)
FLUAV RNA RESP QL NAA+PROBE: NOT DETECTED
FLUBV RNA RESP QL NAA+PROBE: NOT DETECTED
GLUCOSE SERPL-MCNC: 97 MG/DL (ref 74–99)
GLUCOSE UR STRIP.AUTO-MCNC: NORMAL MG/DL
HCT VFR BLD AUTO: 33.7 % (ref 36–46)
HGB BLD-MCNC: 11.3 G/DL (ref 12–16)
IMM GRANULOCYTES # BLD AUTO: 0.09 X10*3/UL (ref 0–0.5)
IMM GRANULOCYTES NFR BLD AUTO: 0.7 % (ref 0–0.9)
KETONES UR STRIP.AUTO-MCNC: ABNORMAL MG/DL
LEUKOCYTE ESTERASE UR QL STRIP.AUTO: ABNORMAL
LYMPHOCYTES # BLD AUTO: 0.22 X10*3/UL (ref 0.8–3)
LYMPHOCYTES NFR BLD AUTO: 1.8 %
MCH RBC QN AUTO: 30.7 PG (ref 26–34)
MCHC RBC AUTO-ENTMCNC: 33.5 G/DL (ref 32–36)
MCV RBC AUTO: 92 FL (ref 80–100)
MONOCYTES # BLD AUTO: 0.38 X10*3/UL (ref 0.05–0.8)
MONOCYTES NFR BLD AUTO: 3.1 %
NEUTROPHILS # BLD AUTO: 11.4 X10*3/UL (ref 1.6–5.5)
NEUTROPHILS NFR BLD AUTO: 93.9 %
NITRITE UR QL STRIP.AUTO: NEGATIVE
NRBC BLD-RTO: 0 /100 WBCS (ref 0–0)
PH UR STRIP.AUTO: 7 [PH]
PLATELET # BLD AUTO: 216 X10*3/UL (ref 150–450)
POTASSIUM SERPL-SCNC: 4 MMOL/L (ref 3.5–5.3)
PROT SERPL-MCNC: 6.1 G/DL (ref 6.4–8.2)
PROT UR STRIP.AUTO-MCNC: NEGATIVE MG/DL
RBC # BLD AUTO: 3.68 X10*6/UL (ref 4–5.2)
RBC # UR STRIP.AUTO: NEGATIVE MG/DL
RBC #/AREA URNS AUTO: ABNORMAL /HPF
RSV RNA RESP QL NAA+PROBE: NOT DETECTED
SARS-COV-2 RNA RESP QL NAA+PROBE: NOT DETECTED
SODIUM SERPL-SCNC: 133 MMOL/L (ref 136–145)
SP GR UR STRIP.AUTO: 1.01
UROBILINOGEN UR STRIP.AUTO-MCNC: NORMAL MG/DL
WBC # BLD AUTO: 12.2 X10*3/UL (ref 4.4–11.3)
WBC #/AREA URNS AUTO: ABNORMAL /HPF

## 2025-02-04 PROCEDURE — 73502 X-RAY EXAM HIP UNI 2-3 VIEWS: CPT | Mod: RIGHT SIDE | Performed by: RADIOLOGY

## 2025-02-04 PROCEDURE — 87637 SARSCOV2&INF A&B&RSV AMP PRB: CPT | Performed by: EMERGENCY MEDICINE

## 2025-02-04 PROCEDURE — 80053 COMPREHEN METABOLIC PANEL: CPT | Performed by: EMERGENCY MEDICINE

## 2025-02-04 PROCEDURE — 2500000001 HC RX 250 WO HCPCS SELF ADMINISTERED DRUGS (ALT 637 FOR MEDICARE OP)

## 2025-02-04 PROCEDURE — 73552 X-RAY EXAM OF FEMUR 2/>: CPT | Mod: RT

## 2025-02-04 PROCEDURE — 87086 URINE CULTURE/COLONY COUNT: CPT | Mod: AHULAB | Performed by: EMERGENCY MEDICINE

## 2025-02-04 PROCEDURE — G0378 HOSPITAL OBSERVATION PER HR: HCPCS

## 2025-02-04 PROCEDURE — 73552 X-RAY EXAM OF FEMUR 2/>: CPT | Mod: RIGHT SIDE | Performed by: RADIOLOGY

## 2025-02-04 PROCEDURE — 73502 X-RAY EXAM HIP UNI 2-3 VIEWS: CPT | Mod: RT

## 2025-02-04 PROCEDURE — 36415 COLL VENOUS BLD VENIPUNCTURE: CPT | Performed by: EMERGENCY MEDICINE

## 2025-02-04 PROCEDURE — 70450 CT HEAD/BRAIN W/O DYE: CPT | Performed by: RADIOLOGY

## 2025-02-04 PROCEDURE — 2500000004 HC RX 250 GENERAL PHARMACY W/ HCPCS (ALT 636 FOR OP/ED)

## 2025-02-04 PROCEDURE — 96372 THER/PROPH/DIAG INJ SC/IM: CPT

## 2025-02-04 PROCEDURE — 99222 1ST HOSP IP/OBS MODERATE 55: CPT

## 2025-02-04 PROCEDURE — 93005 ELECTROCARDIOGRAM TRACING: CPT

## 2025-02-04 PROCEDURE — 85025 COMPLETE CBC W/AUTO DIFF WBC: CPT | Performed by: EMERGENCY MEDICINE

## 2025-02-04 PROCEDURE — 36415 COLL VENOUS BLD VENIPUNCTURE: CPT

## 2025-02-04 PROCEDURE — 99285 EMERGENCY DEPT VISIT HI MDM: CPT | Mod: 25 | Performed by: EMERGENCY MEDICINE

## 2025-02-04 PROCEDURE — 87040 BLOOD CULTURE FOR BACTERIA: CPT | Mod: AHULAB

## 2025-02-04 PROCEDURE — 70450 CT HEAD/BRAIN W/O DYE: CPT

## 2025-02-04 PROCEDURE — 81001 URINALYSIS AUTO W/SCOPE: CPT | Performed by: EMERGENCY MEDICINE

## 2025-02-04 PROCEDURE — 82248 BILIRUBIN DIRECT: CPT | Performed by: EMERGENCY MEDICINE

## 2025-02-04 PROCEDURE — 71045 X-RAY EXAM CHEST 1 VIEW: CPT | Performed by: RADIOLOGY

## 2025-02-04 PROCEDURE — 71045 X-RAY EXAM CHEST 1 VIEW: CPT

## 2025-02-04 RX ORDER — ONDANSETRON 4 MG/1
4 TABLET, FILM COATED ORAL EVERY 8 HOURS PRN
Status: DISCONTINUED | OUTPATIENT
Start: 2025-02-04 | End: 2025-02-13 | Stop reason: HOSPADM

## 2025-02-04 RX ORDER — ACETAMINOPHEN 650 MG/1
650 SUPPOSITORY RECTAL EVERY 4 HOURS PRN
Status: DISCONTINUED | OUTPATIENT
Start: 2025-02-04 | End: 2025-02-13 | Stop reason: HOSPADM

## 2025-02-04 RX ORDER — CYCLOBENZAPRINE HCL 10 MG
10 TABLET ORAL NIGHTLY
Status: DISCONTINUED | OUTPATIENT
Start: 2025-02-04 | End: 2025-02-07

## 2025-02-04 RX ORDER — CEFAZOLIN SODIUM 1 G/50ML
1 SOLUTION INTRAVENOUS EVERY 8 HOURS
Status: DISCONTINUED | OUTPATIENT
Start: 2025-02-05 | End: 2025-02-06

## 2025-02-04 RX ORDER — PANTOPRAZOLE SODIUM 40 MG/1
40 TABLET, DELAYED RELEASE ORAL
COMMUNITY

## 2025-02-04 RX ORDER — CEFAZOLIN SODIUM 2 G/100ML
2 INJECTION, SOLUTION INTRAVENOUS ONCE
Status: DISCONTINUED | OUTPATIENT
Start: 2025-02-04 | End: 2025-02-04

## 2025-02-04 RX ORDER — HEPARIN SODIUM 5000 [USP'U]/ML
5000 INJECTION, SOLUTION INTRAVENOUS; SUBCUTANEOUS EVERY 8 HOURS SCHEDULED
Status: DISCONTINUED | OUTPATIENT
Start: 2025-02-04 | End: 2025-02-13 | Stop reason: HOSPADM

## 2025-02-04 RX ORDER — POLYETHYLENE GLYCOL 3350 17 G/17G
17 POWDER, FOR SOLUTION ORAL DAILY
Status: DISCONTINUED | OUTPATIENT
Start: 2025-02-04 | End: 2025-02-13 | Stop reason: HOSPADM

## 2025-02-04 RX ORDER — ASPIRIN 81 MG/1
81 TABLET ORAL 2 TIMES DAILY
Status: DISCONTINUED | OUTPATIENT
Start: 2025-02-04 | End: 2025-02-06

## 2025-02-04 RX ORDER — ONDANSETRON HYDROCHLORIDE 2 MG/ML
4 INJECTION, SOLUTION INTRAVENOUS EVERY 8 HOURS PRN
Status: DISCONTINUED | OUTPATIENT
Start: 2025-02-04 | End: 2025-02-13 | Stop reason: HOSPADM

## 2025-02-04 RX ORDER — ACETAMINOPHEN 160 MG/5ML
650 SOLUTION ORAL EVERY 4 HOURS PRN
Status: DISCONTINUED | OUTPATIENT
Start: 2025-02-04 | End: 2025-02-13 | Stop reason: HOSPADM

## 2025-02-04 RX ORDER — PANTOPRAZOLE SODIUM 40 MG/1
40 TABLET, DELAYED RELEASE ORAL
Status: DISCONTINUED | OUTPATIENT
Start: 2025-02-05 | End: 2025-02-13 | Stop reason: HOSPADM

## 2025-02-04 RX ORDER — ACETAMINOPHEN 325 MG/1
650 TABLET ORAL EVERY 4 HOURS PRN
Status: DISCONTINUED | OUTPATIENT
Start: 2025-02-04 | End: 2025-02-13 | Stop reason: HOSPADM

## 2025-02-04 RX ORDER — LOSARTAN POTASSIUM 25 MG/1
25 TABLET ORAL DAILY
Status: DISCONTINUED | OUTPATIENT
Start: 2025-02-04 | End: 2025-02-13 | Stop reason: HOSPADM

## 2025-02-04 RX ADMIN — CYCLOBENZAPRINE 10 MG: 10 TABLET, FILM COATED ORAL at 21:43

## 2025-02-04 RX ADMIN — ASPIRIN 81 MG: 81 TABLET, COATED ORAL at 21:43

## 2025-02-04 RX ADMIN — HEPARIN SODIUM 5000 UNITS: 5000 INJECTION, SOLUTION INTRAVENOUS; SUBCUTANEOUS at 21:43

## 2025-02-04 ASSESSMENT — PAIN SCALES - GENERAL
PAINLEVEL_OUTOF10: 0 - NO PAIN
PAINLEVEL_OUTOF10: 10 - WORST POSSIBLE PAIN

## 2025-02-04 ASSESSMENT — PAIN DESCRIPTION - ORIENTATION: ORIENTATION: RIGHT

## 2025-02-04 ASSESSMENT — PAIN DESCRIPTION - DESCRIPTORS: DESCRIPTORS: ACHING;SHARP

## 2025-02-04 ASSESSMENT — PAIN - FUNCTIONAL ASSESSMENT
PAIN_FUNCTIONAL_ASSESSMENT: 0-10
PAIN_FUNCTIONAL_ASSESSMENT: 0-10

## 2025-02-04 ASSESSMENT — PAIN DESCRIPTION - FREQUENCY: FREQUENCY: INTERMITTENT

## 2025-02-04 ASSESSMENT — PAIN DESCRIPTION - PROGRESSION: CLINICAL_PROGRESSION: NOT CHANGED

## 2025-02-04 ASSESSMENT — PAIN DESCRIPTION - PAIN TYPE: TYPE: ACUTE PAIN

## 2025-02-04 ASSESSMENT — PAIN DESCRIPTION - ONSET: ONSET: AWAKENED FROM SLEEP

## 2025-02-04 ASSESSMENT — PAIN DESCRIPTION - LOCATION: LOCATION: HIP

## 2025-02-04 NOTE — PROGRESS NOTES
Pharmacy Medication History     Source of Information: med list from facility    Additional concerns with the patient's PTA list.   N/a  The following updates were made to the Prior to Admission medication list:     Medications ADDED:   pantoprazole  Medications CHANGED:  N/a  Medications REMOVED:   N/a  Medications NOT TAKING:   Zofran,oxycodone,docusate    Allergy reviewed : Yes    Meds 2 Beds : No    Outpatient pharmacy confirmed and updated in chart : Yes    Pharmacy name: [facility]    The list below reflectives the updated PTA list. Please review each medication in order reconciliation for additional clarification and justification.    Prior to Admission Medications   Prescriptions Last Dose Informant   acetaminophen (Tylenol) 500 mg tablet Unknown Other   Sig: Take 2 tablets (1,000 mg) by mouth every 8 hours for 20 days.   aspirin 81 mg EC tablet Unknown Other   Sig: Take 1 tablet (81 mg) by mouth 2 times a day.   cyclobenzaprine (Flexeril) 10 mg tablet Unknown Other   Sig: Take 1 tablet (10 mg) by mouth once daily at bedtime for 14 days. Must not take within 3 hours of the Tramadol.   estradiol (Estrace) 0.01 % (0.1 mg/gram) vaginal cream Unknown Other   Sig: Use a 1/2 gram in the vagina twice weekly at bedtime   Patient taking differently: if needed (prior to planed procedure). Use a 1/2 gram in the vagina twice weekly at bedtime   losartan (Cozaar) 25 mg tablet Unknown Other   Sig: ONE tablet by mouth once daily FOR ESSENTIAL HYPERTENSION   multivitamin with minerals tablet  Other   Sig: Take 1 tablet by mouth once daily.   nitrofurantoin, macrocrystal-monohydrate, (Macrobid) 100 mg capsule Unknown Other   Sig: Take 1 capsule (100 mg) by mouth 2 times a day for 5 days.   pantoprazole (ProtoNix) 40 mg EC tablet Unknown Other   Sig: Take 1 tablet (40 mg) by mouth once daily in the morning. Take before meals. Do not crush, chew, or split.   rosuvastatin (Crestor) 40 mg tablet Unknown Other   Sig:  Take 1 tablet (40 mg) by mouth once daily.   sulfamethoxazole-trimethoprim (Bactrim DS) 800-160 mg tablet Unknown Other   Sig: Take 1 tablet by mouth 2 times a day for 7 days.   traMADol (Ultram) 50 mg tablet Unknown Other   Sig: Take 1-2 tablets ( mg) by mouth every 6 hours if needed for severe pain (7 - 10) for up to 7 days.   valACYclovir (Valtrex) 1 gram tablet Unknown Other   Sig: TAKE TWO TABLETS BY MOUTH TWO TIMES A DAY FOR TWO DOSES AS NEEDED FOR COLD SORE      Facility-Administered Medications: None       The list below reflectives the updated allergy list. Please review each documented allergy for additional clarification and justification.    Allergies   Allergen Reactions    Atorvastatin GI Upset, Confusion and Other    Donepezil Other     Severe nausea, vomiting, and diarrhea per daughter Donna    Amoxicillin Unknown    Codeine Unknown          02/04/25 at 4:25 PM - Cristhian Carrera

## 2025-02-04 NOTE — H&P
History of present illness:  Kimberly Randle is a pleasant 84 year old female with a PMH of HTN, HLD, vagina atrophy and collapse, anxiety, closed fracture of neck of R femur s/p R hemiarthroplasty who presents with reported fall at home as well as altered mental status.      At bedside patient is alert and oriented to the fact that she is in a medical facility, but unaware of why she is there.  Disoriented to date and year, but easily redirectable, knows president of [place].  Denies that she fell initially, but then states that she remembers.  Poor historian, no family member at bedside.  Per ED note, she reports falling and landing on R side of body, denies hitting head.  Denies LOC, dizziness, syncope.    In ED pt denies any CP, SOB, dysuria, diarrhea/constipation, or other complaints.    XR of R hip and R femur  no acute osseous abnormality of R hip arthroplasty, intact R hip  hemiarthroplasty, no acute fracuttre or dislocation.  CT head no acute intracranial pathology, CXR without acute concern.    She is HDS and afebrile, CBC with leukocytosis 12.2 , Na 133  UC from 1/31 with MDR e.coli  COVID and Flu pending  Repeat UC obtained as well as BC, starting her on cefazolin.        Past Medical History:  No date: Personal history of other diseases of the circulatory system      Comment:  History of coronary artery disease  No date: Personal history of other diseases of the circulatory system      Comment:  History of cardiac disorder  12/06/2018: Personal history of other diseases of the circulatory   system      Comment:  History of coronary artery disease  No date: Personal history of other endocrine, nutritional and   metabolic disease      Comment:  History of hyperlipidemia  No date: Personal history of other specified conditions      Comment:  History of palpitations  No date: Personal history of transient ischemic attack (TIA), and   cerebral infarction without residual deficits      Comment:  History of  transient cerebral ischemia     Review of Systems   Constitutional:  Negative for activity change, fatigue and fever.   Respiratory:  Negative for apnea, cough, chest tightness, shortness of breath, wheezing and stridor.    Cardiovascular:  Positive for leg swelling. Negative for chest pain.   Gastrointestinal:  Negative for abdominal distention and abdominal pain.   Genitourinary:  Negative for difficulty urinating, dysuria, flank pain and frequency.   Neurological:  Negative for dizziness, tremors, syncope, weakness and light-headedness.        Surgical History:  She has a past surgical history that includes Coronary artery bypass graft (04/04/2018); Other surgical history (04/27/2018); Other surgical history (12/03/2020); and Other surgical history (12/03/2020).    Social History     Socioeconomic History    Marital status:    Tobacco Use    Smoking status: Former     Current packs/day: 1.50     Types: Cigarettes     Passive exposure: Past    Smokeless tobacco: Never   Vaping Use    Vaping status: Never Used   Substance and Sexual Activity    Alcohol use: Not Currently    Drug use: Never     Social Drivers of Health     Financial Resource Strain: Low Risk  (12/27/2024)    Overall Financial Resource Strain (CARDIA)     Difficulty of Paying Living Expenses: Not hard at all   Food Insecurity: No Food Insecurity (12/27/2024)    Hunger Vital Sign     Worried About Running Out of Food in the Last Year: Never true     Ran Out of Food in the Last Year: Never true   Transportation Needs: No Transportation Needs (12/27/2024)    PRAPARE - Transportation     Lack of Transportation (Medical): No     Lack of Transportation (Non-Medical): No   Physical Activity: Insufficiently Active (6/17/2024)    [facility] [employer] [employer]    Exercise Vital Sign     Days of Exercise per Week: 3 days     Minutes of Exercise per Session: 20 min   Stress: Stress Concern Present (6/17/2024)    Received from [facility]  [facility]    [facility] - Occupational Stress Questionnaire     Feeling of Stress : To some extent   Social Connections: Unknown (12/27/2024)    Social Connection and Isolation Panel [NHANES]     Marital Status:    Intimate Partner Violence: Not At Risk (12/26/2024)    Humiliation, Afraid, Rape, and Kick questionnaire     Fear of Current or Ex-Partner: No     Emotionally Abused: No     Physically Abused: No     Sexually Abused: No   Housing Stability: Low Risk  (12/27/2024)    Housing Stability Vital Sign     Unable to Pay for Housing in the Last Year: No     Number of Times Moved in the Last Year: 0     Homeless in the Last Year: No       Family History   Problem Relation Name Age of Onset    Heart failure Mother      Cancer Father          Home meds:  Current Outpatient Medications   Medication Instructions    acetaminophen (TYLENOL) 1,000 mg, oral, Every 8 hours    aspirin 81 mg, oral, 2 times daily    cyclobenzaprine (FLEXERIL) 10 mg, oral, Nightly, Must not take within 3 hours of the Tramadol.    estradiol (Estrace) 0.01 % (0.1 mg/gram) vaginal cream Use a 1/2 gram in the vagina twice weekly at bedtime    losartan (Cozaar) 25 mg tablet ONE tablet by mouth once daily FOR ESSENTIAL HYPERTENSION    multivitamin with minerals tablet 1 tablet, oral, Daily    nitrofurantoin, macrocrystal-monohydrate, (Macrobid) 100 mg capsule 100 mg, oral, 2 times daily    ondansetron (ZOFRAN) 8 mg, oral, Every 8 hours PRN    oxyCODONE (ROXICODONE) 5 mg, oral, Every 6 hours PRN    pantoprazole (PROTONIX) 40 mg, oral, Daily before breakfast, Do not crush, chew, or split.    rosuvastatin (CRESTOR) 40 mg, oral, Daily    sulfamethoxazole-trimethoprim (Bactrim DS) 800-160 mg tablet 1 tablet, oral, 2 times daily    traMADol (ULTRAM)  mg, oral, Every 6 hours PRN    valACYclovir (Valtrex) 1 gram tablet TAKE TWO TABLETS BY MOUTH TWO TIMES A DAY FOR TWO DOSES AS NEEDED FOR COLD SORE       Vitals  "(Last 24 Hours):  Heart Rate:  [75-87]   Temperature:  [37.1 °C (98.7 °F)]   Respirations:  [14-18]   BP: (128-162)/(71-87)   Height:  [162.6 cm (5' 4\")]   Weight:  [52.2 kg (115 lb)]   Pulse Ox:  [98 %]       Physical Exam  Constitutional:       General: She is awake. She is not in acute distress.     Appearance: Normal appearance.   Cardiovascular:      Rate and Rhythm: Normal rate and regular rhythm.      Pulses: Normal pulses.           Radial pulses are 2+ on the right side and 2+ on the left side.        Dorsalis pedis pulses are 2+ on the right side and 2+ on the left side.      Heart sounds: Normal heart sounds, S1 normal and S2 normal.   Pulmonary:      Effort: Pulmonary effort is normal. No respiratory distress.      Breath sounds: Normal breath sounds.   Abdominal:      General: Abdomen is flat. Bowel sounds are normal. There is no distension.      Palpations: Abdomen is soft.      Tenderness: There is no abdominal tenderness.   Skin:     General: Skin is warm and dry.      Capillary Refill: Capillary refill takes less than 2 seconds.   Neurological:      General: No focal deficit present.      Mental Status: She is alert. She is disoriented and confused.      Comments: Oriented to self, to fact that she is in medical facility.  Was disoriented to situation and time, did know president of [place].  At first denied fall, easily redirected and says she remembers.    Psychiatric:         Attention and Perception: Attention and perception normal.         Mood and Affect: Mood and affect normal.         Speech: Speech normal.         Behavior: Behavior normal. Behavior is cooperative.         Thought Content: Thought content normal.         Cognition and Memory: Cognition is impaired. Memory is impaired.         Judgment: Judgment normal.     Results for orders placed or performed during the hospital encounter of 02/04/25 (from the past 24 hours)   CBC and Auto Differential   Result Value Ref Range    WBC 12.2 (H) " 4.4 - 11.3 x10*3/uL    nRBC 0.0 0.0 - 0.0 /100 WBCs    RBC 3.68 (L) 4.00 - 5.20 x10*6/uL    Hemoglobin 11.3 (L) 12.0 - 16.0 g/dL    Hematocrit 33.7 (L) 36.0 - 46.0 %    MCV 92 80 - 100 fL    MCH 30.7 26.0 - 34.0 pg    MCHC 33.5 32.0 - 36.0 g/dL    RDW 15.1 (H) 11.5 - 14.5 %    Platelets 216 150 - 450 x10*3/uL    Neutrophils % 93.9 40.0 - 80.0 %    Immature Granulocytes %, Automated 0.7 0.0 - 0.9 %    Lymphocytes % 1.8 13.0 - 44.0 %    Monocytes % 3.1 2.0 - 10.0 %    Eosinophils % 0.2 0.0 - 6.0 %    Basophils % 0.3 0.0 - 2.0 %    Neutrophils Absolute 11.40 (H) 1.60 - 5.50 x10*3/uL    Immature Granulocytes Absolute, Automated 0.09 0.00 - 0.50 x10*3/uL    Lymphocytes Absolute 0.22 (L) 0.80 - 3.00 x10*3/uL    Monocytes Absolute 0.38 0.05 - 0.80 x10*3/uL    Eosinophils Absolute 0.02 0.00 - 0.40 x10*3/uL    Basophils Absolute 0.04 0.00 - 0.10 x10*3/uL   Basic metabolic panel   Result Value Ref Range    Glucose 97 74 - 99 mg/dL    Sodium 133 (L) 136 - 145 mmol/L    Potassium 4.0 3.5 - 5.3 mmol/L    Chloride 102 98 - 107 mmol/L    Bicarbonate 23 21 - 32 mmol/L    Anion Gap 12 10 - 20 mmol/L    Urea Nitrogen 20 6 - 23 mg/dL    Creatinine 0.95 0.50 - 1.05 mg/dL    eGFR 58 (L) >60 mL/min/1.73m*2    Calcium 8.9 8.6 - 10.3 mg/dL   Hepatic function panel   Result Value Ref Range    Albumin 3.6 3.4 - 5.0 g/dL    Bilirubin, Total 0.5 0.0 - 1.2 mg/dL    Bilirubin, Direct 0.1 0.0 - 0.3 mg/dL    Alkaline Phosphatase 108 33 - 136 U/L    ALT 19 7 - 45 U/L    AST 25 9 - 39 U/L    Total Protein 6.1 (L) 6.4 - 8.2 g/dL       MEDS:  aspirin, 81 mg, oral, BID  ceFAZolin, 2 g, intravenous, Once  cyclobenzaprine, 10 mg, oral, Nightly  heparin (porcine), 5,000 Units, subcutaneous, q8h NILAY  [Held by provider] losartan, 25 mg, oral, Daily  [START ON 2/5/2025] pantoprazole, 40 mg, oral, Daily before breakfast  polyethylene glycol, 17 g, oral, Daily            PRN medications: acetaminophen **OR** acetaminophen **OR** acetaminophen, ondansetron  **OR** ondansetron      I have reviewed all imaging reports and labs pertinent to this visit    ASSESSMENT/PLAN:    Altered Mental Status  Suspected UTI  Assessment:  -Per chart review, she has had incontinence and lower back pain x 10 days, was getting OP UTI work up  -UC from 1/31 with MDR e.coli  -Leukocytosis and reported increased confusion on admission, with fall at home earlier in the day  -She is asymptomatic, denies any complaints or concerns   Plan:  -Repeat UC obtained  -BC obtained   -Started on cefazolin     Fall  S/P R hip hemiarthroplasty   -No syncope, LOC, weakness reported, although patient is poor historian  -Reportedly fell on R side, has some tenderness to palpation  -Imaging without fracture or acute process  Plan  -Analgesics as needed  -PT/OT consult    Hyponatremia  -Mild  -CTM    DVT Prophylaxis  -SubQ heparin    DC Disposition  -PT/OT consult    Other comorbidities as above  -continue medications as ordered and adjust based on clinical course     Discussed with Dr. Catalina Huerta and the interdisciplinary team     Dylan King, BLANQUITA-CNP

## 2025-02-04 NOTE — ED TRIAGE NOTES
Pt here by ems from home for fall; pt reports that she felt as if her legs gave out on her, denies hitting head  Hx of right hip replacement, pt right foot internally rotated; pt able to bend right leg at knee CO of some discomfort; pulses/sensation intact to right lower extremity

## 2025-02-04 NOTE — ED PROVIDER NOTES
HPI   Chief Complaint   Patient presents with   • Fall       HPI: []  84-year-old white female with a history of hypertension, dementia, CAD, CABG, recent hip fracture status post ORIF comes in with confusion.  Patient was placed on tramadol.  Patient sometimes states that she is confused more than baseline.  Headache possible UTI.  Placed on Macrobid.  When she first was yesterday.  Patient states that she fell.  She complains of right hip pain.  She denies any chest pain pressure heaviness fever chills nausea diarrhea cough congestion incontinence seizures.  Family concerned about her generalized weakness and confusion.    Past history: Hypertension, CAD, CABG, recent hip fracture, dementia  Social: Patient denies current tobacco alcohol drug use.  REVIEW OF SYSTEMS:    GENERAL.: No weight loss, fatigue, anorexia, insomnia, fever.    EYES: No vision loss, double vision, drainage, eye pain.    ENT: No pharyngitis, dry mouth.    CARDIOPULMONARY: No chest pain, palpitations, syncope, near syncope. No shortness of breath, cough, hemoptysis.    GI: No abdominal pain, change in bowel habits, melena, hematemesis, hematochezia, nausea, vomiting, diarrhea.    : No discharge, dysuria, frequency, urgency, hematuria.    MS: No limb pain, joint pain, joint swelling.  Neuro: Positive confusion  SKIN: No rashes.    PSYCH: No depression, anxiety, suicidality, homicidality.    Review of systems is otherwise negative unless stated above or in history of present illness.  Social history, family history, allergies reviewed.  PHYSICAL EXAM:    GENERAL: Vitals noted, no distress. Alert and oriented  x 1. Non-toxic.      EENT: TMs clear. Posterior oropharynx unremarkable. No meningismus. No LAD.     NECK: Supple. Nontender. No midline tenderness.     CARDIAC: Regular, rate, rhythm. No murmurs rubs or gallops. No JVD    PULMONARY: Lungs clear bilaterally with good aeration. No wheezes rales or rhonchi. No respiratory distress.      ABDOMEN: Soft, nonsurgical. Nontender. No peritoneal signs. Normoactive bowel sounds. No pulsatile masses.     EXTREMITIES: No peripheral edema. Negative Homans bilaterally, no cords.  2+ bounding pulses well-perfused.  Musculoskeletal: Patient no midline C, T, L-spine tenderness pelvis stable good range of motion of both knees and ankles.  SKIN: No rash. Intact.     NEURO: No focal neurologic deficits, NIH score of 0. Cranial nerves normal as tested from II through XII.     MEDICAL DECISION MAKING:  EKG on my interpretation shows normal sinus rhythm normal axis rate mid 70s with no acute ischemic change.    CBC with shows my leukocytosis, chemistry is unremarkable CT head negative x-ray of the hip and pelvis and femur are negative.  UA pending.  I reviewed the urinalysis from January 31st which did show MDR E. coli.    Treatment in the ED: Established culture drawn and sent she was given IV cefazolin    ED course: Patient remained stable hemodynamic.  Impression: #1 generalized weakness, #2 mechanical fall, #3 urine tract infection, #4 acute encephalopathy  Plan set MDM: 84-year-old white female recent hip surgery on tramadol comes in with generalized weakness fatigue mechanical fall and confusion etiology most likely is a UTI coupled with infection tramadol.  Low concern for stroke TIA sepsis septic shock STEMI or NSTEMI, patient will be hospitalized for further care.              Patient History   Past Medical History:   Diagnosis Date   • Personal history of other diseases of the circulatory system     History of coronary artery disease   • Personal history of other diseases of the circulatory system     History of cardiac disorder   • Personal history of other diseases of the circulatory system 12/06/2018    History of coronary artery disease   • Personal history of other endocrine, nutritional and metabolic disease     History of hyperlipidemia   • Personal history of other specified conditions     History  of palpitations   • Personal history of transient ischemic attack (TIA), and cerebral infarction without residual deficits     History of transient cerebral ischemia     Past Surgical History:   Procedure Laterality Date   • CORONARY ARTERY BYPASS GRAFT  04/04/2018    CABG   • OTHER SURGICAL HISTORY  04/27/2018    Surgery   • OTHER SURGICAL HISTORY  12/03/2020    Hysterectomy   • OTHER SURGICAL HISTORY  12/03/2020    Lumpectomy     Family History   Problem Relation Name Age of Onset   • Heart failure Mother     • Cancer Father       Social History     Tobacco Use   • Smoking status: Former     Current packs/day: 1.50     Types: Cigarettes     Passive exposure: Past   • Smokeless tobacco: Never   Vaping Use   • Vaping status: Never Used   Substance Use Topics   • Alcohol use: Not Currently   • Drug use: Never       Physical Exam   ED Triage Vitals [02/04/25 1423]   Temperature Heart Rate Respirations BP   37.1 °C (98.7 °F) 87 18 162/74      Pulse Ox Temp Source Heart Rate Source Patient Position   98 % Temporal Monitor Lying      BP Location FiO2 (%)     Right arm --       Physical Exam      ED Course & MDM   Diagnoses as of 02/04/25 1823   Fall, initial encounter   Acute lower UTI   Acute encephalopathy                 No data recorded                                 Medical Decision Making      Procedure  Procedures     Iam Hendrix MD  02/04/25 1824
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
## 2025-02-05 LAB
ANION GAP SERPL CALC-SCNC: 12 MMOL/L (ref 10–20)
ATRIAL RATE: 88 BPM
BUN SERPL-MCNC: 16 MG/DL (ref 6–23)
CALCIUM SERPL-MCNC: 9.3 MG/DL (ref 8.6–10.3)
CHLORIDE SERPL-SCNC: 100 MMOL/L (ref 98–107)
CO2 SERPL-SCNC: 27 MMOL/L (ref 21–32)
CREAT SERPL-MCNC: 0.95 MG/DL (ref 0.5–1.05)
EGFRCR SERPLBLD CKD-EPI 2021: 58 ML/MIN/1.73M*2
ERYTHROCYTE [DISTWIDTH] IN BLOOD BY AUTOMATED COUNT: 15.2 % (ref 11.5–14.5)
GLUCOSE SERPL-MCNC: 81 MG/DL (ref 74–99)
HCT VFR BLD AUTO: 36.8 % (ref 36–46)
HGB BLD-MCNC: 12.1 G/DL (ref 12–16)
MCH RBC QN AUTO: 30.6 PG (ref 26–34)
MCHC RBC AUTO-ENTMCNC: 32.9 G/DL (ref 32–36)
MCV RBC AUTO: 93 FL (ref 80–100)
NRBC BLD-RTO: 0 /100 WBCS (ref 0–0)
P AXIS: 47 DEGREES
P OFFSET: 208 MS
P ONSET: 163 MS
PLATELET # BLD AUTO: 218 X10*3/UL (ref 150–450)
POTASSIUM SERPL-SCNC: 3.7 MMOL/L (ref 3.5–5.3)
PR INTERVAL: 134 MS
Q ONSET: 230 MS
QRS COUNT: 15 BEATS
QRS DURATION: 80 MS
QT INTERVAL: 352 MS
QTC CALCULATION(BAZETT): 425 MS
QTC FREDERICIA: 400 MS
R AXIS: -44 DEGREES
RBC # BLD AUTO: 3.95 X10*6/UL (ref 4–5.2)
SODIUM SERPL-SCNC: 135 MMOL/L (ref 136–145)
T AXIS: 63 DEGREES
T OFFSET: 406 MS
VENTRICULAR RATE: 88 BPM
WBC # BLD AUTO: 7.9 X10*3/UL (ref 4.4–11.3)

## 2025-02-05 PROCEDURE — 2500000004 HC RX 250 GENERAL PHARMACY W/ HCPCS (ALT 636 FOR OP/ED)

## 2025-02-05 PROCEDURE — 96372 THER/PROPH/DIAG INJ SC/IM: CPT | Performed by: INTERNAL MEDICINE

## 2025-02-05 PROCEDURE — 99232 SBSQ HOSP IP/OBS MODERATE 35: CPT

## 2025-02-05 PROCEDURE — 2500000002 HC RX 250 W HCPCS SELF ADMINISTERED DRUGS (ALT 637 FOR MEDICARE OP, ALT 636 FOR OP/ED): Performed by: STUDENT IN AN ORGANIZED HEALTH CARE EDUCATION/TRAINING PROGRAM

## 2025-02-05 PROCEDURE — 2500000004 HC RX 250 GENERAL PHARMACY W/ HCPCS (ALT 636 FOR OP/ED): Performed by: INTERNAL MEDICINE

## 2025-02-05 PROCEDURE — 80048 BASIC METABOLIC PNL TOTAL CA: CPT

## 2025-02-05 PROCEDURE — 97161 PT EVAL LOW COMPLEX 20 MIN: CPT | Mod: GP

## 2025-02-05 PROCEDURE — 36415 COLL VENOUS BLD VENIPUNCTURE: CPT

## 2025-02-05 PROCEDURE — 85027 COMPLETE CBC AUTOMATED: CPT

## 2025-02-05 PROCEDURE — 97165 OT EVAL LOW COMPLEX 30 MIN: CPT | Mod: GO

## 2025-02-05 PROCEDURE — 96365 THER/PROPH/DIAG IV INF INIT: CPT | Mod: 59

## 2025-02-05 PROCEDURE — 2500000001 HC RX 250 WO HCPCS SELF ADMINISTERED DRUGS (ALT 637 FOR MEDICARE OP)

## 2025-02-05 PROCEDURE — 1200000002 HC GENERAL ROOM WITH TELEMETRY DAILY

## 2025-02-05 PROCEDURE — 96372 THER/PROPH/DIAG INJ SC/IM: CPT

## 2025-02-05 RX ORDER — CEFTRIAXONE 1 G/50ML
1 INJECTION, SOLUTION INTRAVENOUS ONCE
Status: DISCONTINUED | OUTPATIENT
Start: 2025-02-05 | End: 2025-02-05

## 2025-02-05 RX ORDER — QUETIAPINE FUMARATE 25 MG/1
12.5 TABLET, FILM COATED ORAL 2 TIMES DAILY
Status: DISCONTINUED | OUTPATIENT
Start: 2025-02-05 | End: 2025-02-06

## 2025-02-05 RX ORDER — OLANZAPINE 10 MG/2ML
5 INJECTION, POWDER, FOR SOLUTION INTRAMUSCULAR ONCE
Status: COMPLETED | OUTPATIENT
Start: 2025-02-05 | End: 2025-02-05

## 2025-02-05 RX ADMIN — CEFAZOLIN SODIUM 1 G: 1 INJECTION, SOLUTION INTRAVENOUS at 01:55

## 2025-02-05 RX ADMIN — CYCLOBENZAPRINE 10 MG: 10 TABLET, FILM COATED ORAL at 21:45

## 2025-02-05 RX ADMIN — QUETIAPINE FUMARATE 12.5 MG: 25 TABLET ORAL at 21:45

## 2025-02-05 RX ADMIN — HEPARIN SODIUM 5000 UNITS: 5000 INJECTION, SOLUTION INTRAVENOUS; SUBCUTANEOUS at 15:00

## 2025-02-05 RX ADMIN — ASPIRIN 81 MG: 81 TABLET, COATED ORAL at 08:18

## 2025-02-05 RX ADMIN — QUETIAPINE FUMARATE 12.5 MG: 25 TABLET ORAL at 08:17

## 2025-02-05 RX ADMIN — CEFAZOLIN SODIUM 1 G: 1 INJECTION, SOLUTION INTRAVENOUS at 17:44

## 2025-02-05 RX ADMIN — ASPIRIN 81 MG: 81 TABLET, COATED ORAL at 21:45

## 2025-02-05 RX ADMIN — ACETAMINOPHEN 650 MG: 325 TABLET, FILM COATED ORAL at 10:36

## 2025-02-05 RX ADMIN — CEFAZOLIN SODIUM 1 G: 1 INJECTION, SOLUTION INTRAVENOUS at 10:33

## 2025-02-05 RX ADMIN — POLYETHYLENE GLYCOL 3350 17 G: 17 POWDER, FOR SOLUTION ORAL at 08:18

## 2025-02-05 RX ADMIN — LOSARTAN POTASSIUM 25 MG: 25 TABLET, FILM COATED ORAL at 08:17

## 2025-02-05 RX ADMIN — OLANZAPINE 5 MG: 10 INJECTION, POWDER, FOR SOLUTION INTRAMUSCULAR at 02:53

## 2025-02-05 RX ADMIN — HEPARIN SODIUM 5000 UNITS: 5000 INJECTION, SOLUTION INTRAVENOUS; SUBCUTANEOUS at 21:48

## 2025-02-05 RX ADMIN — HEPARIN SODIUM 5000 UNITS: 5000 INJECTION, SOLUTION INTRAVENOUS; SUBCUTANEOUS at 06:03

## 2025-02-05 SDOH — SOCIAL STABILITY: SOCIAL INSECURITY: DOES ANYONE TRY TO KEEP YOU FROM HAVING/CONTACTING OTHER FRIENDS OR DOING THINGS OUTSIDE YOUR HOME?: NO

## 2025-02-05 SDOH — HEALTH STABILITY: MENTAL HEALTH: HOW MANY DRINKS CONTAINING ALCOHOL DO YOU HAVE ON A TYPICAL DAY WHEN YOU ARE DRINKING?: PATIENT UNABLE TO ANSWER

## 2025-02-05 SDOH — SOCIAL STABILITY: SOCIAL INSECURITY: HAS ANYONE EVER THREATENED TO HURT YOUR FAMILY OR YOUR PETS?: NO

## 2025-02-05 SDOH — ECONOMIC STABILITY: FOOD INSECURITY
HOW HARD IS IT FOR YOU TO PAY FOR THE VERY BASICS LIKE FOOD, HOUSING, MEDICAL CARE, AND HEATING?: PATIENT UNABLE TO ANSWER

## 2025-02-05 SDOH — ECONOMIC STABILITY: INCOME INSECURITY
IN THE PAST 12 MONTHS HAS THE ELECTRIC, GAS, OIL, OR WATER COMPANY THREATENED TO SHUT OFF SERVICES IN YOUR HOME?: PATIENT UNABLE TO ANSWER

## 2025-02-05 SDOH — HEALTH STABILITY: MENTAL HEALTH: HOW OFTEN DO YOU HAVE SIX OR MORE DRINKS ON ONE OCCASION?: PATIENT UNABLE TO ANSWER

## 2025-02-05 SDOH — HEALTH STABILITY: MENTAL HEALTH: HOW OFTEN DO YOU HAVE A DRINK CONTAINING ALCOHOL?: PATIENT UNABLE TO ANSWER

## 2025-02-05 SDOH — SOCIAL STABILITY: SOCIAL INSECURITY: WITHIN THE LAST YEAR, HAVE YOU BEEN HUMILIATED OR EMOTIONALLY ABUSED IN OTHER WAYS BY YOUR PARTNER OR EX-PARTNER?: NO

## 2025-02-05 SDOH — ECONOMIC STABILITY: FOOD INSECURITY
WITHIN THE PAST 12 MONTHS, THE FOOD YOU BOUGHT JUST DIDN'T LAST AND YOU DIDN'T HAVE MONEY TO GET MORE.: PATIENT UNABLE TO ANSWER

## 2025-02-05 SDOH — ECONOMIC STABILITY: HOUSING INSECURITY
IN THE LAST 12 MONTHS, WAS THERE A TIME WHEN YOU WERE NOT ABLE TO PAY THE MORTGAGE OR RENT ON TIME?: PATIENT UNABLE TO ANSWER

## 2025-02-05 SDOH — SOCIAL STABILITY: SOCIAL INSECURITY: DO YOU FEEL UNSAFE GOING BACK TO THE PLACE WHERE YOU ARE LIVING?: NO

## 2025-02-05 SDOH — SOCIAL STABILITY: SOCIAL INSECURITY: DO YOU FEEL ANYONE HAS EXPLOITED OR TAKEN ADVANTAGE OF YOU FINANCIALLY OR OF YOUR PERSONAL PROPERTY?: NO

## 2025-02-05 SDOH — SOCIAL STABILITY: SOCIAL INSECURITY: WITHIN THE LAST YEAR, HAVE YOU BEEN AFRAID OF YOUR PARTNER OR EX-PARTNER?: NO

## 2025-02-05 SDOH — ECONOMIC STABILITY: HOUSING INSECURITY: AT ANY TIME IN THE PAST 12 MONTHS, WERE YOU HOMELESS OR LIVING IN A SHELTER (INCLUDING NOW)?: PATIENT UNABLE TO ANSWER

## 2025-02-05 SDOH — ECONOMIC STABILITY: FOOD INSECURITY
WITHIN THE PAST 12 MONTHS, YOU WORRIED THAT YOUR FOOD WOULD RUN OUT BEFORE YOU GOT THE MONEY TO BUY MORE.: PATIENT UNABLE TO ANSWER

## 2025-02-05 SDOH — SOCIAL STABILITY: SOCIAL INSECURITY: ARE YOU OR HAVE YOU BEEN THREATENED OR ABUSED PHYSICALLY, EMOTIONALLY, OR SEXUALLY BY ANYONE?: NO

## 2025-02-05 SDOH — SOCIAL STABILITY: SOCIAL INSECURITY: HAVE YOU HAD THOUGHTS OF HARMING ANYONE ELSE?: NO

## 2025-02-05 SDOH — SOCIAL STABILITY: SOCIAL INSECURITY: ABUSE: ADULT

## 2025-02-05 SDOH — SOCIAL STABILITY: SOCIAL INSECURITY: WERE YOU ABLE TO COMPLETE ALL THE BEHAVIORAL HEALTH SCREENINGS?: YES

## 2025-02-05 SDOH — SOCIAL STABILITY: SOCIAL INSECURITY: ARE THERE ANY APPARENT SIGNS OF INJURIES/BEHAVIORS THAT COULD BE RELATED TO ABUSE/NEGLECT?: NO

## 2025-02-05 SDOH — ECONOMIC STABILITY: TRANSPORTATION INSECURITY
IN THE PAST 12 MONTHS, HAS LACK OF TRANSPORTATION KEPT YOU FROM MEDICAL APPOINTMENTS OR FROM GETTING MEDICATIONS?: PATIENT UNABLE TO ANSWER

## 2025-02-05 SDOH — ECONOMIC STABILITY: HOUSING INSECURITY: IN THE PAST 12 MONTHS, HOW MANY TIMES HAVE YOU MOVED WHERE YOU WERE LIVING?: 0

## 2025-02-05 ASSESSMENT — COGNITIVE AND FUNCTIONAL STATUS - GENERAL
DAILY ACTIVITIY SCORE: 15
DRESSING REGULAR LOWER BODY CLOTHING: A LITTLE
DRESSING REGULAR UPPER BODY CLOTHING: A LOT
HELP NEEDED FOR BATHING: A LOT
CLIMB 3 TO 5 STEPS WITH RAILING: TOTAL
TOILETING: A LOT
HELP NEEDED FOR BATHING: A LOT
PATIENT BASELINE BEDBOUND: NO
CLIMB 3 TO 5 STEPS WITH RAILING: TOTAL
WALKING IN HOSPITAL ROOM: A LOT
DRESSING REGULAR LOWER BODY CLOTHING: A LITTLE
WALKING IN HOSPITAL ROOM: A LOT
DAILY ACTIVITIY SCORE: 16
HELP NEEDED FOR BATHING: A LOT
MOVING FROM LYING ON BACK TO SITTING ON SIDE OF FLAT BED WITH BEDRAILS: A LITTLE
DRESSING REGULAR UPPER BODY CLOTHING: A LITTLE
MOVING TO AND FROM BED TO CHAIR: A LOT
MOBILITY SCORE: 15
MOVING TO AND FROM BED TO CHAIR: A LOT
DRESSING REGULAR LOWER BODY CLOTHING: A LOT
MOBILITY SCORE: 14
DAILY ACTIVITIY SCORE: 16
TOILETING: A LOT
MOBILITY SCORE: 15
DRESSING REGULAR UPPER BODY CLOTHING: A LITTLE
TURNING FROM BACK TO SIDE WHILE IN FLAT BAD: A LITTLE
PERSONAL GROOMING: A LITTLE
STANDING UP FROM CHAIR USING ARMS: A LOT
PERSONAL GROOMING: A LOT
TOILETING: A LOT
STANDING UP FROM CHAIR USING ARMS: A LOT
MOVING TO AND FROM BED TO CHAIR: A LOT
STANDING UP FROM CHAIR USING ARMS: A LITTLE
WALKING IN HOSPITAL ROOM: A LOT
CLIMB 3 TO 5 STEPS WITH RAILING: TOTAL
PERSONAL GROOMING: A LOT

## 2025-02-05 ASSESSMENT — PAIN SCALES - PAIN ASSESSMENT IN ADVANCED DEMENTIA (PAINAD)
TOTALSCORE: 2
BREATHING: NORMAL
CONSOLABILITY: DISTRACTED OR REASSURED BY VOICE/TOUCH
BODYLANGUAGE: TENSE, DISTRESSED PACING, FIDGETING
FACIALEXPRESSION: SMILING OR INEXPRESSIVE

## 2025-02-05 ASSESSMENT — PAIN SCALES - GENERAL
PAINLEVEL_OUTOF10: 0 - NO PAIN
PAINLEVEL_OUTOF10: 3
PAINLEVEL_OUTOF10: 0 - NO PAIN
PAINLEVEL_OUTOF10: 0 - NO PAIN
PAINLEVEL_OUTOF10: 5 - MODERATE PAIN

## 2025-02-05 ASSESSMENT — PAIN - FUNCTIONAL ASSESSMENT
PAIN_FUNCTIONAL_ASSESSMENT: 0-10
PAIN_FUNCTIONAL_ASSESSMENT: PAINAD (PAIN ASSESSMENT IN ADVANCED DEMENTIA SCALE)
PAIN_FUNCTIONAL_ASSESSMENT: 0-10
PAIN_FUNCTIONAL_ASSESSMENT: 0-10

## 2025-02-05 ASSESSMENT — PATIENT HEALTH QUESTIONNAIRE - PHQ9
1. LITTLE INTEREST OR PLEASURE IN DOING THINGS: NOT AT ALL
SUM OF ALL RESPONSES TO PHQ9 QUESTIONS 1 & 2: 0
2. FEELING DOWN, DEPRESSED OR HOPELESS: NOT AT ALL

## 2025-02-05 ASSESSMENT — ACTIVITIES OF DAILY LIVING (ADL)
HEARING - LEFT EAR: FUNCTIONAL
JUDGMENT_ADEQUATE_SAFELY_COMPLETE_DAILY_ACTIVITIES: NO
BATHING_ASSISTANCE: MAXIMAL
ASSISTIVE_DEVICE: WALKER
ADEQUATE_TO_COMPLETE_ADL: NO
WALKS IN HOME: NEEDS ASSISTANCE
ASSISTIVE_DEVICE: WALKER
ADL_ASSISTANCE: INDEPENDENT
WALKS IN HOME: NEEDS ASSISTANCE
ADEQUATE_TO_COMPLETE_ADL: NO
HEARING - RIGHT EAR: FUNCTIONAL
LACK_OF_TRANSPORTATION: PATIENT UNABLE TO ANSWER
DRESSING YOURSELF: NEEDS ASSISTANCE
DRESSING YOURSELF: NEEDS ASSISTANCE
PATIENT'S MEMORY ADEQUATE TO SAFELY COMPLETE DAILY ACTIVITIES?: NO
PATIENT'S MEMORY ADEQUATE TO SAFELY COMPLETE DAILY ACTIVITIES?: NO
GROOMING: NEEDS ASSISTANCE
ADL_ASSISTANCE: INDEPENDENT
FEEDING YOURSELF: INDEPENDENT
GROOMING: NEEDS ASSISTANCE
JUDGMENT_ADEQUATE_SAFELY_COMPLETE_DAILY_ACTIVITIES: NO
TOILETING: NEEDS ASSISTANCE
BATHING: NEEDS ASSISTANCE
TOILETING: NEEDS ASSISTANCE
HEARING - LEFT EAR: FUNCTIONAL
FEEDING YOURSELF: NEEDS ASSISTANCE
BATHING: NEEDS ASSISTANCE

## 2025-02-05 ASSESSMENT — PAIN DESCRIPTION - ORIENTATION: ORIENTATION: RIGHT

## 2025-02-05 ASSESSMENT — LIFESTYLE VARIABLES
SKIP TO QUESTIONS 9-10: 0
AUDIT-C TOTAL SCORE: -1

## 2025-02-05 ASSESSMENT — PAIN DESCRIPTION - LOCATION: LOCATION: HIP

## 2025-02-05 NOTE — PROGRESS NOTES
"Physical Therapy    Physical Therapy Evaluation    Patient Name: Kimberly Randle \"Sunitha\"  MRN: [id number]  Department: [facility] [id number]  Room: [address] [place] [address]  Today's Date: 2/5/2025   Time Calculation  Start Time: 1515  Stop Time: 1534  Time Calculation (min): 19 min    Assessment/Plan   PT Assessment  PT Assessment Results: Decreased strength, Decreased endurance, Impaired balance, Decreased mobility, Decreased cognition, Impaired judgement, Decreased safety awareness, Orthopedic restrictions  Rehab Prognosis: Fair  Barriers to Discharge Home: Cognition needs, Physical needs  Cognition Needs: 24hr supervision for safety awareness needed, Recollection or understanding of precautions/restrictions limited, Insight of patient limited regarding functional ability/needs, Cognition-related high falls risk  Physical Needs: Ambulating household distances limited by function/safety, 24hr mobility assistance needed, High falls risk due to function or environment  Evaluation/Treatment Tolerance: Patient tolerated treatment well  Medical Staff Made Aware: Yes  Strengths: Support of Caregivers  Barriers to Participation: Ability to acquire knowledge, Comorbidities, Insight into problems  End of Session Communication: Bedside nurse  Assessment Comment: Pt presents today with decreased BLE strength, balance,  safety awareness, and Hx of fall. Currently, pt is below the documented PLOF requiring min to mod assist for transfers and short distance ambulation. Continued PT would benefit the pt to address the above deficits to reduce pt's risk of falls.  End of Session Patient Position: Bed, 3 rail up, Alarm on  IP OR SWING BED PT PLAN  Inpatient or Swing Bed: Inpatient  PT Plan  Treatment/Interventions: Bed mobility, Transfer training, Gait training, Balance training, Strengthening, Endurance training, Therapeutic exercise, Therapeutic activity, Home exercise program, Postural re-education  PT Plan: Ongoing PT  PT Frequency: 3 times per week  PT " · Suspect secondary to viral URI  · Pulmonology following - Discussed with pulm  · Per pulm recs, the patient's IV steroids were decreased to q12hr 8/14  Continue Xopenex/Atrovent nebs   Add Symbicort 160 two puffs BID  · switch to PO prednisone 8/16, and d/c with 12 day taper  · Will need PFTs as outpatient  · Follow up with pulm  · D/c w/ [facility] as insurance does not cover Symbicort Discharge Recommendations: Moderate intensity level of continued care  Equipment Recommended upon Discharge: Wheeled walker  PT Recommended Transfer Status: Assist x1, Assistive device  PT - OK to Discharge: Yes (PT POC initiated today)    Subjective   General Visit Information:  General  Reason for Referral: 83 y/o F presenting s/p fall at home and with AMS.  Referred By: JANEE King (APRN-CNP)  Past Medical History Relevant to Rehab: HTN, HLD, anxiety, closed fx of neck of R femur s/p R hemiarthroplast 12/27/24, dementia  Family/Caregiver Present: No  Prior to Session Communication: Bedside nurse  Patient Position Received: Bed, 3 rail up, Alarm on  Preferred Learning Style: auditory, kinesthetic  General Comment: Pt supine in bed in B soft wrist restraints. Cleared to participate with RN, confused but agreeable to PT evaluation.  Home Living:  Home Living  Type of Home: Assisted living  Lives With: Alone  Home Adaptive Equipment: Walker rolling or standard  Home Access: Elevator, No concerns  Bathroom Shower/Tub: Walk-in shower  Bathroom Toilet: Handicapped height  Bathroom Equipment: Grab bars in shower, Grab bars around toilet (Shower seat)  Prior Level of Function:  Prior Function Per Pt/Caregiver Report  Level of [place]: Independent with ADLs and functional transfers  Receives Help From:  ([facility] staff)  ADL Assistance: Independent (Per chart/past therapy note IND at baseline)  Homemaking Assistance: Needs assistance  Meal Prep:  (Facility provides meals)  Laundry:  (Facility completes laundry)  Homemaking Assistance Comments: Information obtained from chart, past therapy notes as pt a questionable/poor historian.  Ambulatory Assistance: Independent (Per chart, pt IND with ambulation at baseline. Pt reports use of wheeled walker)  Hand Dominance: Right  Precautions:  Precautions  Hearing/Visual Limitations: B hearing aides  LE Weight Bearing Status: Weight Bearing as Tolerated (RLE)  Medical Precautions:  Fall precautions  Post-Surgical Precautions: Right hip precautions (PMHx of R hip hemiarthroplasty 12/27/24.)    Objective   Pain:  Pain Assessment  Pain Assessment: 0-10  0-10 (Numeric) Pain Score: 0 - No pain  Cognition:  Cognition  Overall Cognitive Status: Impaired  Orientation Level: Disoriented to place, Disoriented to time, Disoriented to situation (Pt able to state correct month but reports current year as 2024. Pt reports being at her assisted.)  Cognition Comments: Pt requiring significant redirection to task throughout session.  Sustained Attention: Impaired  Insight: Severe  Impulsive: Severely    General Assessments:  Activity Tolerance  Endurance: Tolerates 10 - 20 min exercise with multiple rests    Sensation  Sensation Comment: Appears intact       Coordination  Coordination Comment: Appears intact    Postural Control  Postural Control: Impaired  Head Control: Forward head posture  Trunk Control: Trunk flexion in sitting and standing  Posture Comment: Rounded shoulders    Static Sitting Balance  Static Sitting-Balance Support: Bilateral upper extremity supported, Feet supported  Static Sitting-Level of Assistance: Contact guard, Minimum assistance  Static Sitting-Comment/Number of Minutes: Pt demonstrates forward leaning    Static Standing Balance  Static Standing-Balance Support: Bilateral upper extremity supported  Static Standing-Level of Assistance: Minimum assistance  Static Standing-Comment/Number of Minutes: +Gait belt with FWW support  Dynamic Standing Balance  Dynamic Standing-Balance Support: Bilateral upper extremity supported  Dynamic Standing-Level of Assistance: Moderate assistance  Dynamic Standing-Comments: +Gait belt with FWW support  Functional Assessments:  Bed Mobility  Bed Mobility: Yes  Bed Mobility 1  Bed Mobility 1: Supine to sitting  Level of Assistance 1: Contact guard  Bed Mobility Comments 1: HOB flat. Pt able to progress BLE off the EOB. CGA to trunk for stability during raise  into upright sitting. Pt uses bed rail to assist trunk.  Bed Mobility 2  Bed Mobility  2: Sitting to supine  Level of Assistance 2: Moderate assistance  Bed Mobility Comments 2: Assist provided with BLE lift into bed. Pt demonstrates decreased trunk control on descent to the bed requiring assist. HOB flat.  Bed Mobility 3  Bed Mobility 3: Rolling left  Level of Assistance 3: Minimum assistance  Bed Mobility Comments 3: Assist provided at shoulders and hip to initiate roll to the left. Assist provided with hand placement on bed rail to maintain left sidelying.  Bed Mobility 4  Bed Mobility 4:  (Bridging)  Level of Assistance 4: Close supervision  Bed Mobility Comments 4: Pt performs 2 bridges about 5-10 seconds each with fair hip clearance from bed.    Transfers  Transfer: Yes  Transfer 1  Transfer From 1: Bed to  Transfer to 1: Stand  Technique 1: Sit to stand  Transfer Device 1: Walker, Gait belt  Transfer Level of Assistance 1: Minimum assistance, Maximum verbal cues, Maximum tactile cues  Trials/Comments 1: VC/TC for hand placement on the bed for BUE push to stand. Pt opts to rest B hands on FWW to stand. Pt significantly flexed at trunk and hips in standing requiring assist for balance.  Transfers 2  Transfer From 2: Stand to  Transfer to 2: Bed  Technique 2: Stand to sit  Transfer Device 2: Walker, Gait belt  Transfer Level of Assistance 2: Moderate assistance, Moderate verbal cues, Moderate tactile cues  Trials/Comments 2: VC for BLE positioning against the bed before attempting to sit. Pt unable to release FWW when sitting with decreased control requiring assist for safe approximation with bed.    Ambulation/Gait Training  Ambulation/Gait Training Performed: Yes  Ambulation/Gait Training 1  Surface 1: Level tile  Device 1: Rolling walker  Gait Support Devices: Gait belt  Assistance 1: Moderate assistance  Quality of Gait 1: Forward flexed posture, Decreased step length  Comments/Distance (ft) 1: Pt takes 4  sidesteps left. Decreased step length observed. Significantly decreased safety awareness noted with pt repeatedly lifting FWW off the ground and keeping device at an increased distance from body. Assist provided with FWW management as well as balance.    Stairs  Stairs: No  Extremity/Trunk Assessments:  RLE   RLE : Exceptions to WFL  Strength RLE  R Knee Extension:  (At least 3-/5)  R Ankle Dorsiflexion:  (At least 2/5)  R Ankle Plantar Flexion:  (At least 2/5)  LLE   LLE : Exceptions to WFL  Strength LLE  L Hip Flexion:  (At least 3/5)  L Knee Extension:  (At least 3-/5)  L Ankle Dorsiflexion:  (At least 2/5)  L Ankle Plantar Flexion:  (At least 2/5)  Outcome Measures:  [facility] Basic Mobility  Turning from your back to your side while in a flat bed without using bedrails: A little  Moving from lying on your back to sitting on the side of a flat bed without using bedrails: A little  Moving to and from bed to chair (including a wheelchair): A lot  Standing up from a chair using your arms (e.g. wheelchair or bedside chair): A little  To walk in hospital room: A lot  Climbing 3-5 steps with railing: Total  Basic Mobility - Total Score: 14    Encounter Problems       Encounter Problems (Active)       Balance       Goal 1       Start:  02/05/25    Expected End:  02/19/25       Pt performs all sitting balance with supervision and standing balance with CGA using FWW            Mobility       STG - Patient will ambulate       Start:  02/05/25    Expected End:  02/19/25       20 ft with min assist x 1 using FWW            PT Transfers       STG - Patient to transfer to and from sit to supine       Start:  02/05/25    Expected End:  02/19/25       With CGA to/from a flat bed         STG - Patient will transfer sit to and from stand       Start:  02/05/25    Expected End:  02/19/25       Safely, with CGA using FWW and proper body mechanics              Education Documentation  Handouts, taught by Goldy Burleson, PT at 2/5/2025   4:26 PM.  Learner: Patient  Readiness: Acceptance  Method: Explanation, Demonstration  Response: Needs Reinforcement    Precautions, taught by Goldy Burleson, PT at 2/5/2025  4:26 PM.  Learner: Patient  Readiness: Acceptance  Method: Explanation, Demonstration  Response: Needs Reinforcement    Body Mechanics, taught by Goldy Burleson, PT at 2/5/2025  4:26 PM.  Learner: Patient  Readiness: Acceptance  Method: Explanation, Demonstration  Response: Needs Reinforcement    Mobility Training, taught by Goldy Burleson PT at 2/5/2025  4:26 PM.  Learner: Patient  Readiness: Acceptance  Method: Explanation, Demonstration  Response: Needs Reinforcement

## 2025-02-05 NOTE — NURSING NOTE
Patient's daughter (Donna Cunningham) called and updated on patient requiring restraints. She agreed with the directive and requested to be informed at least 2 hours in advance before the mother is discharged as she lives in [place] and would like to come help discharge the mother back to the facility. She confirmed that patient has 2 walkers and a hip kit at home and should not get those on discharge.

## 2025-02-05 NOTE — PROGRESS NOTES
02/05/25 1514   Discharge Planning   Living Arrangements Alone   Support Systems Children   Type of Residence Assisted living   Care Facility Name [facility]   Home or Post Acute Services Post acute facilities (Rehab/SNF/etc)   Type of Post Acute Facility Services Assisted living   Expected Discharge Disposition Home     Patient required restraints today.  Patient pulled her IV out earlier.  Patient receiving IV antibiotics.  Pending final urine culture results.

## 2025-02-05 NOTE — CARE PLAN
The patient's goals for the shift include      The clinical goals for the shift include patient remains free of injury, less confused    Over the shift, the patient did not make progress toward the following goals.       Problem: Pain - Adult  Goal: Verbalizes/displays adequate comfort level or baseline comfort level  Outcome: Progressing     Problem: Discharge Planning  Goal: Discharge to home or other facility with appropriate resources  Outcome: Progressing

## 2025-02-05 NOTE — PROGRESS NOTES
Medicine PA-C follow up note    Subjective:  Seen earlier this AM - pt is pleasantly confused. Per nursing she was up all night and did pull out her IV and try to get out of bed multiple times. Pt is redirectable when someone is with her but was trying to get out of bed repeatedly during visit     Additional information:      Vitals (Last 24 Hours):  Heart Rate:  [71-86]   Temp:  [36.4 °C (97.6 °F)-36.6 °C (97.9 °F)]   Resp:  [14-27]   BP: (119-147)/(61-87)   SpO2:  [96 %-98 %]       I have reviewed all imaging reports and labs pertinent to this visit / presenting problem    PHYSICAL EXAM:  Constitutional: NAD, not oriented to place or situation, appears to answer yes/no questions appropriately   Eyes: no icterus  ENMT: mucous membranes moist, no lesions  Head/Neck: supple  Respiratory/Thorax: CTA bilaterally, non-labored breathing, no cough, on RA  Cardiovascular: RRR, no murmurs heard  Gastrointestinal: ND/S/NT  : no Sweeney, no SP/flank discomfort  Musculoskeletal: no joint swelling, ROM intact  Extremities: no edema  Neurological: alert to self, not oriented to place or situation   Skin: warm and dry  Psych: calm, stable mood     MEDS:  Scheduled meds  aspirin, 81 mg, oral, BID  ceFAZolin, 1 g, intravenous, q8h  cyclobenzaprine, 10 mg, oral, Nightly  heparin (porcine), 5,000 Units, subcutaneous, q8h NILAY  losartan, 25 mg, oral, Daily  pantoprazole, 40 mg, oral, Daily before breakfast  polyethylene glycol, 17 g, oral, Daily  QUEtiapine, 12.5 mg, oral, BID        Continuous meds       PRN meds  PRN medications: acetaminophen **OR** acetaminophen **OR** acetaminophen, ondansetron **OR** ondansetron      ASSESSMENT/PLAN:  Mrs. Randle is an 84 year old female with PMH of Alzheimer's disease HTN, HLD, atrophic vaginitis, and collapse, anxiety, closed fracture of neck of R femur s/p R hemiarthroplasty who [place] [facility] [place].     Acute encephalopathy   Acute agitation  Hx of Alzheimer's disease   Suspected  UTI  Atrophic vaginitis   - Suspect acute encephalopathy is multifactorial with UTI, recent narcotic pain medicine use, and disruption of routine contributing   - UC from 1/31 with MDR e.coli - pt was treated with Macrobid   - UA borderline in ED, culture pending, will treat with Cefazolin per prior susceptibility until culture results   - BC obtained   - Pt repeatedly trying to get out of bed, pulled IV - soft restraints ordered, see report below  - Per daughter pt is scheduled to have pessary with OBGYN replaced later this month      Fall  S/P R hip hemiarthroplasty   - No syncope, LOC, weakness reported, although patient is poor historian  - Reportedly fell on R side, has some tenderness to palpation  - Imaging without fracture or acute process  - Analgesics as needed  - PT/OT consulted      Hyponatremia, improving   -Mild  -CTM    Other comorbidities as above  - Continue medications as ordered and adjust based on clinical course     VTE / GI prophylaxis   - Heparin inj, PPI, bowel regimen in place     Discharge planning  - Pending PT/OT evals     Discussed with Dr. Huerta and the interdisciplinary team     Geeta Reza PA-C     Behavioral Restraint / Seclusion Face to Face Assessment    Patient Name:         Kimberly Randle  YOB: 1940  Medical Record #:   [id number]      Time Restraints were placed:      Date Assessment was completed: 2/5/2025    Time patient was assessed:  1000     Description of behavior causing restraint/seclusion:  Interfering with medical care (removed IV), constantly trying to get out of bed (fall risk with recent hip fx)     Type of intervention:  Soft restraints to bilateral wrists     Patient's immediate situation: no signs of psychological distress    Alternatives Attempted: Alternatives attempted and have been ineffective.    Contraindications for Restraints: Reviewed contraindications for continued restraint use and agree to on-going need.    The medication  administered is appropriate for the patient's condition based on imminent danger failing alternatives attempted: N/A    Patent's reaction to intervention: appears to be tolerating restraint/seclusion without distress or adverse response    Patient's medical condition: vital signs stable    Patient's behavioral condition: Other Continues to try to get up out of bed despite wrist restraints     Plan: Continue restraints    Geeta Reza PA-C

## 2025-02-05 NOTE — PROGRESS NOTES
"Occupational Therapy    Evaluation    Patient Name: Kimberly Randle \"Sunitha\"  MRN: [id number]  Department: [place] [address]  Room: [address] [place] [address]  Today's Date: 2/5/2025  Time Calculation  Start Time: 1127  Stop Time: 1137  Time Calculation (min): 10 min    Assessment  IP OT Assessment  OT Assessment:  (OT Eval complete, patient is weak and deconditioned. Patient with decreased standing balance, decreased transfers, decreased ADL independence. Moderate intensity therapy recommended to return to prior level of function.)  Prognosis: Good  Barriers to Discharge Home: Cognition needs, Physical needs  Cognition Needs: Recollection or understanding of precautions/restrictions limited, Insight of patient limited regarding functional ability/needs  Physical Needs: Intermittent ADL assistance needed, Intermittent mobility assistance needed, Ambulating household distances limited by function/safety  Evaluation/Treatment Tolerance: Patient limited by fatigue  Medical Staff Made Aware: Yes  End of Session Communication: Bedside nurse  End of Session Patient Position: Bed, 3 rail up, Alarm on  Plan:  Treatment Interventions: ADL retraining, Functional transfer training, Endurance training, Patient/family training, Neuromuscular reeducation  OT Frequency: 3 times per week  OT Discharge Recommendations: Moderate intensity level of continued care  Equipment Recommended upon Discharge: Wheeled walker (Hip kit)  OT Recommended Transfer Status: Moderate assist, Assist of 1  OT - OK to Discharge: Yes (Per POC)    Subjective   Current Problem:  1. Fall, initial encounter        2. Acute lower UTI        3. Acute encephalopathy          General:  General  Reason for Referral:  (83 y/o female admitted with Fall, Legs gave out.  Recent R Hip Hemiarthroplasty 12/27)  Referred By:  (Bradley CASON)  Past Medical History Relevant to Rehab:   Past Medical History:   Diagnosis Date    Personal history of other diseases of the circulatory system     History of " coronary artery disease    Personal history of other diseases of the circulatory system     History of cardiac disorder    Personal history of other diseases of the circulatory system 12/06/2018    History of coronary artery disease    Personal history of other endocrine, nutritional and metabolic disease     History of hyperlipidemia    Personal history of other specified conditions     History of palpitations    Personal history of transient ischemic attack (TIA), and cerebral infarction without residual deficits     History of transient cerebral ischemia     Family/Caregiver Present: No  Prior to Session Communication: Bedside nurse  Patient Position Received: Bed, 3 rail up, Alarm on  General Comment:  (Patient supine in B wrist restraints, questionable historian and weak and deconditioned.)  Precautions:  LE Weight Bearing Status: Weight Bearing as Tolerated (R LE)  Medical Precautions: Fall precautions  Post-Surgical Precautions: Right hip precautions    Pain:  Pain Assessment  Pain Assessment: 0-10  0-10 (Numeric) Pain Score: 5 - Moderate pain  Pain Type: Acute pain  Pain Location: Wrist  Pain Orientation: Right    Objective   Cognition:  Overall Cognitive Status:  (Confused, questionable historian)  Orientation Level:  (Disoriented to month)  Safety/Judgement:  (Decreased insight)  Insight: Severe  Impulsive: Severely     Home Living:  Type of Home:  (Assisted Living Facility with elevator, bed/bath on same level, walk in shower, Handicapped ht toilet.)  Lives With: Alone  Home Adaptive Equipment:  (Wheeled walker, raised toilet seat.)   Prior Function:  Level of [place]: Independent with ADLs and functional transfers  Receives Help From:  (Staff)  ADL Assistance: Independent  Homemaking Assistance:  (Meals provided)  Ambulatory Assistance: Independent (with wheeled walker.)  Hand Dominance: Right  IADL History:  Homemaking Responsibilities:  (Meals provided)  ADL:  Eating Assistance:  Independent  Grooming Assistance: Stand by  Bathing Assistance: Maximal (due to hip precautions, anticipate assist below knees)  UE Dressing Assistance: Maximal  LE Dressing Assistance: Maximal (Due to hip precautions, assist with initiating over feet.)  Toileting Assistance with Device: Maximal  Functional Assistance: Maximal  Activity Tolerance:  Endurance: Tolerates 10 - 20 min exercise with multiple rests  Activity Tolerance Comments:  (Fair activity tolerance.)  Bed Mobility/Transfers: Bed Mobility  Bed Mobility: Yes  Bed Mobility 1  Bed Mobility 1: Supine to sitting  Level of Assistance 1: Moderate assistance  Bed Mobility Comments 1:  (Assist with upper trunk and LEs)  Bed Mobility 2  Bed Mobility  2: Sitting to supine  Level of Assistance 2: Moderate assistance  Bed Mobility Comments 2:  (Assist with LEs on bed.)    Transfers  Transfer: Yes  Transfer 1  Transfer From 1: Sit to  Transfer to 1: Stand  Technique 1: Sit to stand  Transfer Device 1: Walker  Transfer Level of Assistance 1: Moderate assistance  Trials/Comments 1:  (Cues for hand placement.)    Sitting Balance:  Static Sitting Balance  Static Sitting-Balance Support: Feet supported  Static Sitting-Level of Assistance: Contact guard  Dynamic Sitting Balance  Dynamic Sitting-Balance Support: Feet supported  Dynamic Sitting-Level of Assistance: Contact guard  Dynamic Sitting-Balance: Reaching for objects  Standing Balance:  Static Standing Balance  Static Standing-Balance Support: Bilateral upper extremity supported  Static Standing-Level of Assistance: Moderate assistance  Dynamic Standing Balance  Dynamic Standing-Balance Support: Bilateral upper extremity supported  Dynamic Standing-Level of Assistance: Moderate assistance  Dynamic Standing-Balance: Reaching for objects    IADL's:   Homemaking Responsibilities:  (Meals provided)    Sensation:  Light Touch: No apparent deficits  Strength:  Strength Comments:  (B UEs- 4/5  throughout)    Coordination:  Movements are Fluid and Coordinated: Yes   Hand Function:  Hand Function  Gross Grasp: Functional  Coordination: Functional    Outcome Measures: [facility] Daily Activity  Putting on and taking off regular lower body clothing: A lot  Bathing (including washing, rinsing, drying): A lot  Putting on and taking off regular upper body clothing: A lot  Toileting, which includes using toilet, bedpan or urinal: A lot  Taking care of personal grooming such as brushing teeth: A little  Eating Meals: None  Daily Activity - Total Score: 15    Goals:   Encounter Problems       Encounter Problems (Active)       ADLs       Patient will perform UB and LB bathing  with stand by assist level of assistance.        Start:  02/05/25    Expected End:  02/19/25            Patient with complete upper body dressing with stand by assist level of assistance donning and doffing all UE clothes with no adaptive equipment while edge of bed        Start:  02/05/25    Expected End:  02/19/25            Patient with complete lower body dressing with minimal assist  level of assistance donning and doffing all LE clothes  with reacher, sock-aid, and dressing stick  while edge of bed        Start:  02/05/25    Expected End:  02/19/25            Patient will complete daily grooming tasks brushing teeth and washing face/hair with supervision level of assistance and PRN adaptive equipment while standing.       Start:  02/05/25    Expected End:  02/19/25               BALANCE       Patientt will maintain static standing balance during ADL task with supervision level of assistance drop down in order to demonstrate decreased risk of falling and improved postural control.       Start:  02/05/25    Expected End:  02/19/25               COGNITION/SAFETY       Patient to adhere to hip precautions with minimal verbal cues and demonstrate good safety awareness during ADLs and transfers.        Start:  02/05/25    Expected End:  02/19/25                TRANSFERS       Patient will perform bed mobility supervision level of assistance and bed rails in order to improve safety and independence with mobility       Start:  02/05/25    Expected End:  02/19/25            Patient will complete functional transfer to all surfaces with front wheeled walker with stand by assist level of assistance.       Start:  02/05/25    Expected End:  02/19/25

## 2025-02-05 NOTE — CARE PLAN
The patient's goals for the shift include be free from confusion     The clinical goals for the shift include Remain free from falls and injuries    Over the shift, the patient did not make progress toward the following goals. Barriers to progression include  Recommendations to address these barriers include      Problem: Pain - Adult  Goal: Verbalizes/displays adequate comfort level or baseline comfort level  Outcome: Progressing     Problem: Safety - Adult  Goal: Free from fall injury  Outcome: Progressing     Problem: Discharge Planning  Goal: Discharge to home or other facility with appropriate resources  Outcome: Progressing     Problem: Chronic Conditions and Co-morbidities  Goal: Patient's chronic conditions and co-morbidity symptoms are monitored and maintained or improved  Outcome: Progressing     Problem: Skin  Goal: Prevent/minimize sheer/friction injuries  Outcome: Progressing     Problem: Skin  Goal: Promote skin healing  Outcome: Progressing

## 2025-02-06 LAB
ANION GAP SERPL CALC-SCNC: 14 MMOL/L (ref 10–20)
BACTERIA UR CULT: NO GROWTH
BUN SERPL-MCNC: 20 MG/DL (ref 6–23)
CALCIUM SERPL-MCNC: 9.6 MG/DL (ref 8.6–10.3)
CHLORIDE SERPL-SCNC: 106 MMOL/L (ref 98–107)
CO2 SERPL-SCNC: 24 MMOL/L (ref 21–32)
CREAT SERPL-MCNC: 0.84 MG/DL (ref 0.5–1.05)
EGFRCR SERPLBLD CKD-EPI 2021: 68 ML/MIN/1.73M*2
ERYTHROCYTE [DISTWIDTH] IN BLOOD BY AUTOMATED COUNT: 14.9 % (ref 11.5–14.5)
GLUCOSE SERPL-MCNC: 112 MG/DL (ref 74–99)
HCT VFR BLD AUTO: 40 % (ref 36–46)
HGB BLD-MCNC: 12.8 G/DL (ref 12–16)
MCH RBC QN AUTO: 29.7 PG (ref 26–34)
MCHC RBC AUTO-ENTMCNC: 32 G/DL (ref 32–36)
MCV RBC AUTO: 93 FL (ref 80–100)
NRBC BLD-RTO: 0 /100 WBCS (ref 0–0)
PLATELET # BLD AUTO: 256 X10*3/UL (ref 150–450)
POTASSIUM SERPL-SCNC: 4.1 MMOL/L (ref 3.5–5.3)
RBC # BLD AUTO: 4.31 X10*6/UL (ref 4–5.2)
SODIUM SERPL-SCNC: 140 MMOL/L (ref 136–145)
WBC # BLD AUTO: 6.6 X10*3/UL (ref 4.4–11.3)

## 2025-02-06 PROCEDURE — 1200000002 HC GENERAL ROOM WITH TELEMETRY DAILY

## 2025-02-06 PROCEDURE — 85027 COMPLETE CBC AUTOMATED: CPT

## 2025-02-06 PROCEDURE — 2500000002 HC RX 250 W HCPCS SELF ADMINISTERED DRUGS (ALT 637 FOR MEDICARE OP, ALT 636 FOR OP/ED)

## 2025-02-06 PROCEDURE — 2500000004 HC RX 250 GENERAL PHARMACY W/ HCPCS (ALT 636 FOR OP/ED)

## 2025-02-06 PROCEDURE — 2500000002 HC RX 250 W HCPCS SELF ADMINISTERED DRUGS (ALT 637 FOR MEDICARE OP, ALT 636 FOR OP/ED): Performed by: STUDENT IN AN ORGANIZED HEALTH CARE EDUCATION/TRAINING PROGRAM

## 2025-02-06 PROCEDURE — 2500000001 HC RX 250 WO HCPCS SELF ADMINISTERED DRUGS (ALT 637 FOR MEDICARE OP)

## 2025-02-06 PROCEDURE — 99232 SBSQ HOSP IP/OBS MODERATE 35: CPT

## 2025-02-06 PROCEDURE — 36415 COLL VENOUS BLD VENIPUNCTURE: CPT

## 2025-02-06 PROCEDURE — 80048 BASIC METABOLIC PNL TOTAL CA: CPT

## 2025-02-06 RX ORDER — ASPIRIN 81 MG/1
81 TABLET ORAL DAILY
Status: DISCONTINUED | OUTPATIENT
Start: 2025-02-07 | End: 2025-02-13 | Stop reason: HOSPADM

## 2025-02-06 RX ORDER — QUETIAPINE FUMARATE 25 MG/1
12.5 TABLET, FILM COATED ORAL ONCE
Status: COMPLETED | OUTPATIENT
Start: 2025-02-06 | End: 2025-02-07

## 2025-02-06 RX ORDER — QUETIAPINE FUMARATE 25 MG/1
25 TABLET, FILM COATED ORAL 2 TIMES DAILY
Status: DISCONTINUED | OUTPATIENT
Start: 2025-02-06 | End: 2025-02-13 | Stop reason: HOSPADM

## 2025-02-06 RX ADMIN — HEPARIN SODIUM 5000 UNITS: 5000 INJECTION, SOLUTION INTRAVENOUS; SUBCUTANEOUS at 06:44

## 2025-02-06 RX ADMIN — LOSARTAN POTASSIUM 25 MG: 25 TABLET, FILM COATED ORAL at 09:02

## 2025-02-06 RX ADMIN — HEPARIN SODIUM 5000 UNITS: 5000 INJECTION, SOLUTION INTRAVENOUS; SUBCUTANEOUS at 14:35

## 2025-02-06 RX ADMIN — PANTOPRAZOLE SODIUM 40 MG: 40 TABLET, DELAYED RELEASE ORAL at 06:44

## 2025-02-06 RX ADMIN — ASPIRIN 81 MG: 81 TABLET, COATED ORAL at 09:10

## 2025-02-06 RX ADMIN — QUETIAPINE FUMARATE 12.5 MG: 25 TABLET ORAL at 09:15

## 2025-02-06 RX ADMIN — POLYETHYLENE GLYCOL 3350 17 G: 17 POWDER, FOR SOLUTION ORAL at 09:02

## 2025-02-06 RX ADMIN — QUETIAPINE FUMARATE 25 MG: 25 TABLET ORAL at 21:36

## 2025-02-06 RX ADMIN — CEFAZOLIN SODIUM 1 G: 1 INJECTION, SOLUTION INTRAVENOUS at 02:55

## 2025-02-06 RX ADMIN — ACETAMINOPHEN 650 MG: 325 TABLET, FILM COATED ORAL at 23:45

## 2025-02-06 RX ADMIN — QUETIAPINE FUMARATE 12.5 MG: 25 TABLET ORAL at 09:10

## 2025-02-06 RX ADMIN — HEPARIN SODIUM 5000 UNITS: 5000 INJECTION, SOLUTION INTRAVENOUS; SUBCUTANEOUS at 21:36

## 2025-02-06 RX ADMIN — ACETAMINOPHEN 650 MG: 325 TABLET, FILM COATED ORAL at 14:33

## 2025-02-06 RX ADMIN — CEFAZOLIN SODIUM 1 G: 1 INJECTION, SOLUTION INTRAVENOUS at 09:02

## 2025-02-06 RX ADMIN — CYCLOBENZAPRINE 10 MG: 10 TABLET, FILM COATED ORAL at 21:36

## 2025-02-06 ASSESSMENT — COGNITIVE AND FUNCTIONAL STATUS - GENERAL
DRESSING REGULAR LOWER BODY CLOTHING: A LOT
MOVING FROM LYING ON BACK TO SITTING ON SIDE OF FLAT BED WITH BEDRAILS: A LITTLE
MOVING FROM LYING ON BACK TO SITTING ON SIDE OF FLAT BED WITH BEDRAILS: A LITTLE
EATING MEALS: A LOT
CLIMB 3 TO 5 STEPS WITH RAILING: A LOT
DRESSING REGULAR UPPER BODY CLOTHING: A LOT
STANDING UP FROM CHAIR USING ARMS: A LOT
DRESSING REGULAR LOWER BODY CLOTHING: A LOT
MOVING TO AND FROM BED TO CHAIR: A LOT
PERSONAL GROOMING: A LOT
HELP NEEDED FOR BATHING: A LOT
HELP NEEDED FOR BATHING: A LOT
CLIMB 3 TO 5 STEPS WITH RAILING: A LOT
PERSONAL GROOMING: A LOT
EATING MEALS: A LOT
TURNING FROM BACK TO SIDE WHILE IN FLAT BAD: A LITTLE
WALKING IN HOSPITAL ROOM: A LOT
CLIMB 3 TO 5 STEPS WITH RAILING: A LOT
MOBILITY SCORE: 14
TURNING FROM BACK TO SIDE WHILE IN FLAT BAD: A LITTLE
TURNING FROM BACK TO SIDE WHILE IN FLAT BAD: A LITTLE
STANDING UP FROM CHAIR USING ARMS: A LOT
EATING MEALS: A LOT
DRESSING REGULAR UPPER BODY CLOTHING: A LOT
STANDING UP FROM CHAIR USING ARMS: A LOT
DRESSING REGULAR LOWER BODY CLOTHING: A LOT
STANDING UP FROM CHAIR USING ARMS: A LOT
MOVING TO AND FROM BED TO CHAIR: A LOT
DRESSING REGULAR LOWER BODY CLOTHING: A LOT
MOVING TO AND FROM BED TO CHAIR: A LOT
PERSONAL GROOMING: A LOT
WALKING IN HOSPITAL ROOM: A LOT
MOVING FROM LYING ON BACK TO SITTING ON SIDE OF FLAT BED WITH BEDRAILS: A LITTLE
MOVING FROM LYING ON BACK TO SITTING ON SIDE OF FLAT BED WITH BEDRAILS: A LITTLE
TURNING FROM BACK TO SIDE WHILE IN FLAT BAD: A LITTLE
DRESSING REGULAR UPPER BODY CLOTHING: A LOT
PERSONAL GROOMING: A LOT
CLIMB 3 TO 5 STEPS WITH RAILING: A LOT
STANDING UP FROM CHAIR USING ARMS: A LOT
MOVING TO AND FROM BED TO CHAIR: A LOT
MOVING TO AND FROM BED TO CHAIR: A LOT
EATING MEALS: A LOT
PERSONAL GROOMING: A LOT
DRESSING REGULAR UPPER BODY CLOTHING: A LOT
HELP NEEDED FOR BATHING: A LOT
EATING MEALS: A LOT
DRESSING REGULAR LOWER BODY CLOTHING: A LOT
CLIMB 3 TO 5 STEPS WITH RAILING: A LOT
DRESSING REGULAR UPPER BODY CLOTHING: A LOT
TURNING FROM BACK TO SIDE WHILE IN FLAT BAD: A LITTLE
MOVING FROM LYING ON BACK TO SITTING ON SIDE OF FLAT BED WITH BEDRAILS: A LITTLE
WALKING IN HOSPITAL ROOM: A LOT
DAILY ACTIVITIY SCORE: 14
HELP NEEDED FOR BATHING: A LOT
HELP NEEDED FOR BATHING: A LOT
WALKING IN HOSPITAL ROOM: A LOT
WALKING IN HOSPITAL ROOM: A LOT

## 2025-02-06 ASSESSMENT — PAIN DESCRIPTION - LOCATION: LOCATION: HIP

## 2025-02-06 ASSESSMENT — PAIN SCALES - GENERAL
PAINLEVEL_OUTOF10: 0 - NO PAIN

## 2025-02-06 ASSESSMENT — PAIN - FUNCTIONAL ASSESSMENT
PAIN_FUNCTIONAL_ASSESSMENT: 0-10

## 2025-02-06 NOTE — CARE PLAN
The patient's goals for the shift include      The clinical goals for the shift include Maintain safety throughout shift    Over the shift, the patient became more directable. Remains confused, but calm. Video consult completed with Dr. Sanchez. Pt. Resting in bed with bed alarm fully engaged.

## 2025-02-06 NOTE — CONSULTS
Reason For Consult  ***    History Of Present Illness  Sunitha Randle is a 84 y.o. female admitted 2/4/25 from     Climbing out of bed, no sleep last night  Follows directions by staff for repositioning without difficulty  Does not appear to be in pain except for momentary twinge that resolves immediately after repositioning.   Thinks she has to go to [employer] today, staff corrects.     Perseverating about getting clean, just had shampoo but keeps asking about needing a shower    Was hospital  at [facility] (that's where she went to school), so some confabulation/confusion mixed with some factual info.        Past Medical History  She has a past medical history of Personal history of other diseases of the circulatory system, Personal history of other diseases of the circulatory system, Personal history of other diseases of the circulatory system (12/06/2018), Personal history of other endocrine, nutritional and metabolic disease, Personal history of other specified conditions, and Personal history of transient ischemic attack (TIA), and cerebral infarction without residual deficits.    Surgical History  She has a past surgical history that includes Coronary artery bypass graft (04/04/2018); Other surgical history (04/27/2018); Other surgical history (12/03/2020); and Other surgical history (12/03/2020).     Social History  She reports that she has quit smoking. Her smoking use included cigarettes. She has been exposed to tobacco smoke. She has never used smokeless tobacco. She reports that she does not currently use alcohol. She reports that she does not use drugs.    Family History  Family History   Problem Relation Name Age of Onset    Heart failure Mother      Cancer Father          Allergies  Atorvastatin, Donepezil, Amoxicillin, and Codeine    Physical Exam  Physical Exam     Last Recorded Vitals  Blood pressure 119/65, pulse 90, temperature 35.9 °C (96.6 °F), temperature source Temporal, resp. rate 17,  "height 1.626 m (5' 4.02\"), weight 46.7 kg (102 lb 15.3 oz), SpO2 96%.    Relevant Results  Scheduled medications  [START ON 2/7/2025] aspirin, 81 mg, oral, Daily  cyclobenzaprine, 10 mg, oral, Nightly  heparin (porcine), 5,000 Units, subcutaneous, q8h NILAY  losartan, 25 mg, oral, Daily  pantoprazole, 40 mg, oral, Daily before breakfast  polyethylene glycol, 17 g, oral, Daily  QUEtiapine, 12.5 mg, oral, Once  QUEtiapine, 25 mg, oral, BID      Results for orders placed or performed during the hospital encounter of 02/04/25 (from the past 96 hours)   CBC and Auto Differential   Result Value Ref Range    WBC 12.2 (H) 4.4 - 11.3 x10*3/uL    nRBC 0.0 0.0 - 0.0 /100 WBCs    RBC 3.68 (L) 4.00 - 5.20 x10*6/uL    Hemoglobin 11.3 (L) 12.0 - 16.0 g/dL    Hematocrit 33.7 (L) 36.0 - 46.0 %    MCV 92 80 - 100 fL    MCH 30.7 26.0 - 34.0 pg    MCHC 33.5 32.0 - 36.0 g/dL    RDW 15.1 (H) 11.5 - 14.5 %    Platelets 216 150 - 450 x10*3/uL    Neutrophils % 93.9 40.0 - 80.0 %    Immature Granulocytes %, Automated 0.7 0.0 - 0.9 %    Lymphocytes % 1.8 13.0 - 44.0 %    Monocytes % 3.1 2.0 - 10.0 %    Eosinophils % 0.2 0.0 - 6.0 %    Basophils % 0.3 0.0 - 2.0 %    Neutrophils Absolute 11.40 (H) 1.60 - 5.50 x10*3/uL    Immature Granulocytes Absolute, Automated 0.09 0.00 - 0.50 x10*3/uL    Lymphocytes Absolute 0.22 (L) 0.80 - 3.00 x10*3/uL    Monocytes Absolute 0.38 0.05 - 0.80 x10*3/uL    Eosinophils Absolute 0.02 0.00 - 0.40 x10*3/uL    Basophils Absolute 0.04 0.00 - 0.10 x10*3/uL   Basic metabolic panel   Result Value Ref Range    Glucose 97 74 - 99 mg/dL    Sodium 133 (L) 136 - 145 mmol/L    Potassium 4.0 3.5 - 5.3 mmol/L    Chloride 102 98 - 107 mmol/L    Bicarbonate 23 21 - 32 mmol/L    Anion Gap 12 10 - 20 mmol/L    Urea Nitrogen 20 6 - 23 mg/dL    Creatinine 0.95 0.50 - 1.05 mg/dL    eGFR 58 (L) >60 mL/min/1.73m*2    Calcium 8.9 8.6 - 10.3 mg/dL   Hepatic function panel   Result Value Ref Range    Albumin 3.6 3.4 - 5.0 g/dL    Bilirubin, " Total 0.5 0.0 - 1.2 mg/dL    Bilirubin, Direct 0.1 0.0 - 0.3 mg/dL    Alkaline Phosphatase 108 33 - 136 U/L    ALT 19 7 - 45 U/L    AST 25 9 - 39 U/L    Total Protein 6.1 (L) 6.4 - 8.2 g/dL   Sars-CoV-2 PCR   Result Value Ref Range    Coronavirus 2019, PCR Not Detected Not Detected   RSV PCR   Result Value Ref Range    RSV PCR Not Detected Not Detected   Influenza A, and B PCR   Result Value Ref Range    Flu A Result Not Detected Not Detected    Flu B Result Not Detected Not Detected   Urinalysis with Reflex Culture and Microscopic   Result Value Ref Range    Color, Urine Yellow Light-Yellow, Yellow, Dark-Yellow    Appearance, Urine Clear Clear    Specific Gravity, Urine 1.015 1.005 - 1.035    pH, Urine 7.0 5.0, 5.5, 6.0, 6.5, 7.0, 7.5, 8.0    Protein, Urine NEGATIVE NEGATIVE, 10 (TRACE), 20 (TRACE) mg/dL    Glucose, Urine Normal Normal mg/dL    Blood, Urine NEGATIVE NEGATIVE mg/dL    Ketones, Urine 10 (1+) (A) NEGATIVE mg/dL    Bilirubin, Urine NEGATIVE NEGATIVE mg/dL    Urobilinogen, Urine Normal Normal mg/dL    Nitrite, Urine NEGATIVE NEGATIVE    Leukocyte Esterase, Urine 75 Milo/uL (A) NEGATIVE   Blood Culture    Specimen: Peripheral Arterial Puncture; Blood culture   Result Value Ref Range    Blood Culture No growth at 1 day    Blood Culture    Specimen: Peripheral Arterial Puncture; Blood culture   Result Value Ref Range    Blood Culture No growth at 1 day    Microscopic Only, Urine   Result Value Ref Range    WBC, Urine 21-50 (A) 1-5, NONE /HPF    RBC, Urine 1-2 NONE, 1-2, 3-5 /HPF   Urine Culture    Specimen: Clean Catch/Voided; Urine   Result Value Ref Range    Urine Culture No growth    ECG 12 lead   Result Value Ref Range    Ventricular Rate 88 BPM    Atrial Rate 88 BPM    TX Interval 134 ms    QRS Duration 80 ms    QT Interval 352 ms    QTC Calculation(Bazett) 425 ms    P Axis 47 degrees    R Axis -44 degrees    T Axis 63 degrees    QRS Count 15 beats    Q Onset 230 ms    P Onset 163 ms    P Offset 208 ms     T Offset 406 ms    QTC Fredericia 400 ms   CBC   Result Value Ref Range    WBC 7.9 4.4 - 11.3 x10*3/uL    nRBC 0.0 0.0 - 0.0 /100 WBCs    RBC 3.95 (L) 4.00 - 5.20 x10*6/uL    Hemoglobin 12.1 12.0 - 16.0 g/dL    Hematocrit 36.8 36.0 - 46.0 %    MCV 93 80 - 100 fL    MCH 30.6 26.0 - 34.0 pg    MCHC 32.9 32.0 - 36.0 g/dL    RDW 15.2 (H) 11.5 - 14.5 %    Platelets 218 150 - 450 x10*3/uL   Basic metabolic panel   Result Value Ref Range    Glucose 81 74 - 99 mg/dL    Sodium 135 (L) 136 - 145 mmol/L    Potassium 3.7 3.5 - 5.3 mmol/L    Chloride 100 98 - 107 mmol/L    Bicarbonate 27 21 - 32 mmol/L    Anion Gap 12 10 - 20 mmol/L    Urea Nitrogen 16 6 - 23 mg/dL    Creatinine 0.95 0.50 - 1.05 mg/dL    eGFR 58 (L) >60 mL/min/1.73m*2    Calcium 9.3 8.6 - 10.3 mg/dL   CBC   Result Value Ref Range    WBC 6.6 4.4 - 11.3 x10*3/uL    nRBC 0.0 0.0 - 0.0 /100 WBCs    RBC 4.31 4.00 - 5.20 x10*6/uL    Hemoglobin 12.8 12.0 - 16.0 g/dL    Hematocrit 40.0 36.0 - 46.0 %    MCV 93 80 - 100 fL    MCH 29.7 26.0 - 34.0 pg    MCHC 32.0 32.0 - 36.0 g/dL    RDW 14.9 (H) 11.5 - 14.5 %    Platelets 256 150 - 450 x10*3/uL   Basic metabolic panel   Result Value Ref Range    Glucose 112 (H) 74 - 99 mg/dL    Sodium 140 136 - 145 mmol/L    Potassium 4.1 3.5 - 5.3 mmol/L    Chloride 106 98 - 107 mmol/L    Bicarbonate 24 21 - 32 mmol/L    Anion Gap 14 10 - 20 mmol/L    Urea Nitrogen 20 6 - 23 mg/dL    Creatinine 0.84 0.50 - 1.05 mg/dL    eGFR 68 >60 mL/min/1.73m*2    Calcium 9.6 8.6 - 10.3 mg/dL        Assessment/Plan     ***    I spent *** minutes in the professional and overall care of this patient.      Naomy Sanchez MD    Virtual or Telephone Consent    An interactive audio and video telecommunication system which permits real time communications between the patient (at the originating site) and provider (at the distant site) was utilized to provide this telehealth service.   Verbal consent was requested and obtained from Kimberly Randle on this  date, 02/06/25 for a telehealth visit.

## 2025-02-06 NOTE — PROGRESS NOTES
2/6/2025 9:49 AM Message left for Director of Nursing at the [facility] [phone]. Daxa BARRIENTOS

## 2025-02-06 NOTE — PROGRESS NOTES
02/06/25 1352   Discharge Planning   Home or Post Acute Services Post acute facilities (Rehab/SNF/etc)   Type of Post Acute Facility Services Assisted living   Expected Discharge Disposition Home   Does the patient need discharge transport arranged? Yes   RoundTrip coordination needed? Yes     Spoke to patient's daughterDonna (POA and primary contact) about discharge plans.  Granddaughter, Mamie was listed as person to call on admission--I changed it to Donna in the chart.  Donna shared that her mother is at the [facility]-they help her will all medications, laundry and meals.  She does wear a life alert.  Patient was receiving pt/ot at the facility prior to this admission.    Patient had hip surgery and then went to [facility].  12 days ago she returned to [facility].  Patient had a reaction to oxycodone at [facility] and they switched her to flexeril.  At there 4 week post surgery follow up patient was put on tramadol.    Daughter does not want her to go to [facility].  She is ok to return to the regular AL.

## 2025-02-06 NOTE — PROGRESS NOTES
"Medicine PA-GAVI follow up note    Subjective:  Ongoing confusion, per nursing pt was up all night. Upped dose of Seroquel to 25mg BID. Urine culture resulted with no growth, abx stopped     Additional information:      Vitals (Last 24 Hours):  Heart Rate:  []   Temp:  [35.8 °C (96.4 °F)-36.7 °C (98.1 °F)]   Resp:  [17-18]   BP: (143-169)/(72-90)   Height:  [162.6 cm (5' 4.02\")]   Weight:  [46.7 kg (102 lb 15.3 oz)]   SpO2:  [90 %-98 %]       I have reviewed all imaging reports and labs pertinent to this visit / presenting problem    PHYSICAL EXAM:  Constitutional: Not oriented to place or situation, acutely agitated, pulling on soft restraints   Eyes: no icterus  ENMT: mucous membranes moist, no lesions  Head/Neck: supple  Respiratory/Thorax: CTA bilaterally, non-labored breathing, no cough, on RA  Cardiovascular: RRR, no murmurs heard  Gastrointestinal: ND/S/NT  : no Sweeney, no SP/flank discomfort  Musculoskeletal: no joint swelling, ROM intact  Extremities: no edema, mild erythema noted to bilateral wrists from pulling on restraints   Neurological: alert to self, not oriented to place or situation   Skin: warm and dry  Psych: Agitated, not redirectable     MEDS:  Scheduled meds  aspirin, 81 mg, oral, BID  cyclobenzaprine, 10 mg, oral, Nightly  heparin (porcine), 5,000 Units, subcutaneous, q8h NILAY  losartan, 25 mg, oral, Daily  pantoprazole, 40 mg, oral, Daily before breakfast  polyethylene glycol, 17 g, oral, Daily  QUEtiapine, 12.5 mg, oral, Once  QUEtiapine, 25 mg, oral, BID        Continuous meds       PRN meds  PRN medications: acetaminophen **OR** acetaminophen **OR** acetaminophen, ondansetron **OR** ondansetron      ASSESSMENT/PLAN:  Mrs. Randle is an 84 year old female with PMH of Alzheimer's disease HTN, HLD, atrophic vaginitis with collapse, anxiety, closed fracture of neck of R femur s/p R hemiarthroplasty who presented to [facility] for a fall/AMS     Acute encephalopathy   Acute agitation  Hx of " Alzheimer's disease   - Suspect acute encephalopathy is multifactorial with recent UTI, recent narcotic pain medicine use, and disruption of routine contributing   - BC obtained   - Pt repeatedly trying to get out of bed, pulled IV - was in soft restraints yesterday, will trial upping Seroquel dose and removing wrist restraints today   - Psych consulted    Recent UTI  - UC from 1/31 with MDR e.coli - pt was treated with Macrobid   - UA borderline in ED - UC with no growth, abx discontinued     Atrophic vaginitis   - Per daughter pt is scheduled to have pessary with OBGYN replaced later this month      Fall  S/P R hip hemiarthroplasty   - No syncope, LOC, weakness reported, although patient is poor historian  - Reportedly fell on R side  - Imaging without fracture or acute process  - Analgesics as needed  - PT/OT recommending mod      Hyponatremia, resolved  - Monitor labs     Other comorbidities as above  - Continue medications as ordered and adjust based on clinical course     VTE / GI prophylaxis   - Subcutaneous Heparin, PPI, bowel regimen in place     Discharge planning  - Return to AL vs SNF     Discussed with Dr. Huerta and the interdisciplinary team     Addendum: Pt upgraded to inpatient. Dr. Crisostomo notified and accepted     Geeta Reza PA-C

## 2025-02-06 NOTE — CARE PLAN
The patient's goals for the shift include      The clinical goals for the shift include Maintain safety throughout shift

## 2025-02-06 NOTE — CONSULTS
Nutrition Consult Note  Nutrition Assessment      Reason for Assessment: Admission nursing screening    Kimberly Randle is a 84 y.o. year old female patient with Acute lower UTI [N39.0]  Acute encephalopathy [G93.40]  Fall, initial encounter [W19.XXXA]  Fall at home, initial encounter [W19.XXXA, Y92.009]    referred for MST-2 unsure wt loss   .   Chart reviewed and pt visited.  Past Medical History:   Diagnosis Date    Personal history of other diseases of the circulatory system     History of coronary artery disease    Personal history of other diseases of the circulatory system     History of cardiac disorder    Personal history of other diseases of the circulatory system 12/06/2018    History of coronary artery disease    Personal history of other endocrine, nutritional and metabolic disease     History of hyperlipidemia    Personal history of other specified conditions     History of palpitations    Personal history of transient ischemic attack (TIA), and cerebral infarction without residual deficits     History of transient cerebral ischemia     Per chart review:  -Poor historian  -Admitted after fall  -Restrained; pulled IV out    Visited with pt; pt unable to participate in full nutrition assessment at this time.    Scheduled medications  aspirin, 81 mg, oral, BID  cyclobenzaprine, 10 mg, oral, Nightly  heparin (porcine), 5,000 Units, subcutaneous, q8h NILAY  losartan, 25 mg, oral, Daily  pantoprazole, 40 mg, oral, Daily before breakfast  polyethylene glycol, 17 g, oral, Daily  QUEtiapine, 12.5 mg, oral, Once  QUEtiapine, 25 mg, oral, BID      Continuous medications     PRN medications  PRN medications: acetaminophen **OR** acetaminophen **OR** acetaminophen, ondansetron **OR** ondansetron    Nutrition Significant Labs:  BMP Trend:   Results from last 7 days   Lab Units 02/06/25  0427 02/05/25  0344 02/04/25  1425   GLUCOSE mg/dL 112* 81 97   CALCIUM mg/dL 9.6 9.3 8.9   SODIUM mmol/L 140 135* 133*  "  POTASSIUM mmol/L 4.1 3.7 4.0   CO2 mmol/L 24 27 23   CHLORIDE mmol/L 106 100 102   BUN mg/dL 20 16 20   CREATININE mg/dL 0.84 0.95 0.95    , Liver Function Trend:   Results from last 7 days   Lab Units 02/04/25  1425   ALK PHOS U/L 108   AST U/L 25   ALT U/L 19   BILIRUBIN TOTAL mg/dL 0.5    , Renal Lab Trend:   Results from last 7 days   Lab Units 02/06/25  0427 02/05/25  0344 02/04/25  1425   POTASSIUM mmol/L 4.1 3.7 4.0   SODIUM mmol/L 140 135* 133*   EGFR mL/min/1.73m*2 68 58* 58*   BUN mg/dL 20 16 20   CREATININE mg/dL 0.84 0.95 0.95    , Lipid Panel:   Lab Results   Component Value Date    CHOL 156 11/23/2022    HDL 61.4 11/23/2022    CHHDL 2.5 11/23/2022    LDLF 70 11/23/2022    VLDL 25 11/23/2022    TRIG 125 11/23/2022    , Vit D: No results found for: \"VITD25\" , Vit B12:   Lab Results   Component Value Date    [id number] 778 11/06/2023        Dietary Orders (From admission, onward)       Start     Ordered    02/05/25 0534  May Participate in Room Service  ( ROOM SERVICE MAY PARTICIPATE)  Once        Question:  .  Answer:  Yes    02/05/25 0533    02/04/25 1809  Adult diet Regular  Diet effective now        Question:  Diet type  Answer:  Regular    02/04/25 1808                  History:  Food and Nutrient History: Uncertain of po intake- pt not appropriate for full nutrition assessment    Anthropometrics:  Height: 162.6 cm (5' 4.02\")  Weight: 46.7 kg (102 lb 15.3 oz)  BMI (Calculated): 17.66    Weight Change: -10.47    Wt Readings from Last 12 Encounters:   02/06/25 46.7 kg (102 lb 15.3 oz)   12/26/24 49.9 kg (110 lb)   12/25/24 49.9 kg (110 lb)   11/06/24 51.3 kg (113 lb)   10/24/24 49.9 kg (110 lb)   09/30/24 50.3 kg (110 lb 12.8 oz)   07/23/24 48.3 kg (106 lb 6.4 oz)   05/13/24 47.6 kg (105 lb)   05/07/24 46.7 kg (103 lb)   04/25/24 47.1 kg (103 lb 12.8 oz)   03/14/24 46.6 kg (102 lb 12.8 oz)   01/23/24 (!) 42.6 kg (94 lb)     Significant Weight Loss: Yes  Interpretation of Weight Loss: >7.5% in 3 " "months       IBW/kg (Dietitian Calculated): 54.5 kg  Percent of IBW: 86 %       Energy Needs:  Height: 162.6 cm (5' 4.02\")  Temp: 35.8 °C (96.4 °F)    Total Energy Estimated Needs in 24 hours (kCal): 1400 kCal  Energy Estimated Needs per kg Body Weight in 24 hours (kCal/kg): 1650 kCal/kg  Method for Estimating Needs: 30-35kcal/kg    Total Protein Estimated Needs in 24 Hours (g): 55 g  Protein Estimated Needs per kg Body Weight in 24 Hours (g/kg): 70 g/kg  Method for Estimating 24 Hour Protein Needs: 1.2-1.5g/kg    Method for Estimating 24 Hour Fluid Needs: 1mL/kcal or MD recommendations       Nutrition Focused Physical Findings:  Orbital Fat Pads: Severe (dark circles, hollowing and loose skin) (visual assessment)  Buccal Fat Pads: Severe (hollow, sunken and narrow face)    Temporalis: Severe (hollowed scooping depression)  Pectoralis (Clavicular Region): Severe (protruding prominent clavicle)  Deltoid/Trapezius: Severe (squared shoulders, acromion process prominent)    Edema Location: non-pitting RLE/LLE       Respiratory : Negative       Nutrition Diagnosis   Malnutrition Diagnosis  Patient has Malnutrition Diagnosis: Yes  Diagnosis Status: New  Malnutrition Diagnosis: Severe malnutrition related to chronic disease or condition  As Evidenced by: Greater than 7/5% weight loss in 3 months, severe fat loss and severe muscle loss.  Additional Assessment Information: Suspected prolonged poor oral intake    Patient has Nutrition Diagnosis: Yes  Nutrition Diagnosis 1: Self-feeding difficulty  Diagnosis Status (1): New  Related to (1): AMS  As Evidenced by (1): confused; restrained       Nutrition Interventions/Recommendations      Food and/or Nutrient Delivery Interventions  Meals and Snacks: General healthful diet     Goal: Ensure nutritional supplements TID    Meal set up management; Feeding position management; Other (Comment)    Goal Pt in restraints- requiring help with feeding at this time.       Nutrition " Monitoring and Evaluation   Food and Nutrient Related History  Estimated Energy Intake: Energy intake greater or equal to 75% of estimated energy needs    Fluid Intake: Estimated fluid intake    Intake / Amount of food: Meets > 75% estimated energy needs, Consumes at least 75% or more of meals/snacks/supplements       Anthropometrics: Body Composition/Growth/Weight History  Body Weight: Body weight - Promote weight restoration    Body Weight Change: Body weight gain - Gradual weight gain    Biochemical Data, Medical Tests and Procedures       Gastrointestinal Profile: Other (Comment)  Criteria: As clinically indicated    Glucose/Endocrine Profile: Glucose within normal limits ( mg/dL)  Criteria: As clinically indicated    Nutrition Focused Physical Findings  Adipose Finding: Loss of subcutaneous fat       Digestive System Finding: Nausea, Vomiting, Constipation, Diarrhea    Muscle Finding: Muscle atrophy    Time Spent (min): 60 minutes  Last Date of Nutrition Visit: 02/06/25  Nutrition Follow-Up Needed?: Dietitian to reassess per policy  Follow up Comment: ONOFRE Fregoso

## 2025-02-06 NOTE — DOCUMENTATION CLARIFICATION NOTE
PATIENT:               JANA CERVANTES  ACCT #:                  [id number]  MRN:                       [id number]  :                       1940  ADMIT DATE:       2025 2:00 PM  DISCH DATE:  RESPONDING PROVIDER #:        [id number]          PROVIDER RESPONSE TEXT:    Metabolic Encephalopathy 2/2 UTI    CDI QUERY TEXT:    Clarification    Instruction:    Based on your assessment of the patient and the clinical information, please provide the requested documentation by clicking on the appropriate radio button and enter any additional information if prompted.    Question: Please further clarify the type of Encephalopathy as    When answering this query, please exercise your independent professional judgment. The fact that a question is being asked, does not imply that any particular answer is desired or expected.    The patient's clinical indicators include:  Clinical Information: 83 y/o female presented with AMS, fall. Recent ORIF for hip fracture, recent UTI with MDR e. coli.    Clinical Indicators: ED note: ?Encephalopathy? ?Acute lower UTI?    H&P: ?Altered mental status?     Progress note: ?pt is pleasantly confused? ?Per nursing she was up all night and did pull out her IV and try to get out of bed multiple times. Pt is redirectable when someone is with her but was trying to get out of bed repeatedly during visit? ?not oriented to place or situation, appears to answer yes/no questions appropriately?    Nursing notes: ?Disoriented to time, place, situation?    Treatment: attempts to reorient    Risk Factors: dementia, Alzheimer's disease, UTI  Options provided:  -- Metabolic Encephalopathy 2/2 UTI  -- Other - I will add my own diagnosis  -- Refer to Clinical Documentation Reviewer    Query created by: Elvira Gee on 2025 8:16 AM      Electronically signed by:  JAYRO CLEANING MD 2025 9:12 AM

## 2025-02-06 NOTE — CARE PLAN
The patient's goals for the shift include  pain management.     The clinical goals for the shift include Maintain safety throughout shift    Over the shift, the patient did not make progress toward obeying commands. Resting in bed.

## 2025-02-07 PROCEDURE — 2500000001 HC RX 250 WO HCPCS SELF ADMINISTERED DRUGS (ALT 637 FOR MEDICARE OP)

## 2025-02-07 PROCEDURE — 97530 THERAPEUTIC ACTIVITIES: CPT | Mod: GP

## 2025-02-07 PROCEDURE — 2500000002 HC RX 250 W HCPCS SELF ADMINISTERED DRUGS (ALT 637 FOR MEDICARE OP, ALT 636 FOR OP/ED)

## 2025-02-07 PROCEDURE — 97535 SELF CARE MNGMENT TRAINING: CPT | Mod: GO

## 2025-02-07 PROCEDURE — 2500000004 HC RX 250 GENERAL PHARMACY W/ HCPCS (ALT 636 FOR OP/ED)

## 2025-02-07 PROCEDURE — 2500000005 HC RX 250 GENERAL PHARMACY W/O HCPCS: Performed by: FAMILY MEDICINE

## 2025-02-07 PROCEDURE — 1200000002 HC GENERAL ROOM WITH TELEMETRY DAILY

## 2025-02-07 RX ORDER — TALC
3 POWDER (GRAM) TOPICAL NIGHTLY
Status: DISCONTINUED | OUTPATIENT
Start: 2025-02-07 | End: 2025-02-13 | Stop reason: HOSPADM

## 2025-02-07 RX ADMIN — HEPARIN SODIUM 5000 UNITS: 5000 INJECTION, SOLUTION INTRAVENOUS; SUBCUTANEOUS at 09:08

## 2025-02-07 RX ADMIN — LOSARTAN POTASSIUM 25 MG: 25 TABLET, FILM COATED ORAL at 09:09

## 2025-02-07 RX ADMIN — PANTOPRAZOLE SODIUM 40 MG: 40 TABLET, DELAYED RELEASE ORAL at 09:09

## 2025-02-07 RX ADMIN — QUETIAPINE FUMARATE 25 MG: 25 TABLET ORAL at 09:09

## 2025-02-07 RX ADMIN — HEPARIN SODIUM 5000 UNITS: 5000 INJECTION, SOLUTION INTRAVENOUS; SUBCUTANEOUS at 23:41

## 2025-02-07 RX ADMIN — QUETIAPINE FUMARATE 12.5 MG: 25 TABLET ORAL at 09:08

## 2025-02-07 RX ADMIN — ASPIRIN 81 MG: 81 TABLET, COATED ORAL at 09:09

## 2025-02-07 RX ADMIN — QUETIAPINE FUMARATE 25 MG: 25 TABLET ORAL at 21:10

## 2025-02-07 RX ADMIN — Medication 3 MG: at 21:10

## 2025-02-07 RX ADMIN — HEPARIN SODIUM 5000 UNITS: 5000 INJECTION, SOLUTION INTRAVENOUS; SUBCUTANEOUS at 15:01

## 2025-02-07 ASSESSMENT — COGNITIVE AND FUNCTIONAL STATUS - GENERAL
DRESSING REGULAR LOWER BODY CLOTHING: A LOT
DRESSING REGULAR UPPER BODY CLOTHING: A LITTLE
HELP NEEDED FOR BATHING: A LOT
MOVING TO AND FROM BED TO CHAIR: A LOT
DRESSING REGULAR UPPER BODY CLOTHING: A LOT
PERSONAL GROOMING: A LITTLE
DAILY ACTIVITIY SCORE: 16
MOVING TO AND FROM BED TO CHAIR: A LITTLE
MOVING FROM LYING ON BACK TO SITTING ON SIDE OF FLAT BED WITH BEDRAILS: A LITTLE
MOBILITY SCORE: 14
DRESSING REGULAR UPPER BODY CLOTHING: A LOT
STANDING UP FROM CHAIR USING ARMS: A LITTLE
DAILY ACTIVITIY SCORE: 19
CLIMB 3 TO 5 STEPS WITH RAILING: TOTAL
STANDING UP FROM CHAIR USING ARMS: A LOT
CLIMB 3 TO 5 STEPS WITH RAILING: TOTAL
WALKING IN HOSPITAL ROOM: A LOT
TURNING FROM BACK TO SIDE WHILE IN FLAT BAD: A LITTLE
MOVING TO AND FROM BED TO CHAIR: A LOT
HELP NEEDED FOR BATHING: A LOT
TURNING FROM BACK TO SIDE WHILE IN FLAT BAD: A LITTLE
PERSONAL GROOMING: A LOT
WALKING IN HOSPITAL ROOM: A LOT
TOILETING: A LOT
DRESSING REGULAR LOWER BODY CLOTHING: A LOT
HELP NEEDED FOR BATHING: A LITTLE
DRESSING REGULAR LOWER BODY CLOTHING: A LOT
CLIMB 3 TO 5 STEPS WITH RAILING: A LOT
MOBILITY SCORE: 15
TURNING FROM BACK TO SIDE WHILE IN FLAT BAD: A LITTLE
DAILY ACTIVITIY SCORE: 15
MOVING FROM LYING ON BACK TO SITTING ON SIDE OF FLAT BED WITH BEDRAILS: A LITTLE
MOBILITY SCORE: 14
STANDING UP FROM CHAIR USING ARMS: A LITTLE
MOVING FROM LYING ON BACK TO SITTING ON SIDE OF FLAT BED WITH BEDRAILS: A LITTLE
WALKING IN HOSPITAL ROOM: A LOT
PERSONAL GROOMING: A LITTLE

## 2025-02-07 ASSESSMENT — PAIN SCALES - GENERAL
PAINLEVEL_OUTOF10: 0 - NO PAIN

## 2025-02-07 ASSESSMENT — PAIN - FUNCTIONAL ASSESSMENT
PAIN_FUNCTIONAL_ASSESSMENT: 0-10

## 2025-02-07 ASSESSMENT — ACTIVITIES OF DAILY LIVING (ADL): HOME_MANAGEMENT_TIME_ENTRY: 12

## 2025-02-07 NOTE — CARE PLAN
The patient's goals for the shift include      The clinical goals for the shift include patient will remain safe this shift    Problem: Skin  Goal: Prevent/minimize sheer/friction injuries  Outcome: Progressing  Flowsheets (Taken 2/7/2025 1108)  Prevent/minimize sheer/friction injuries:   Complete micro-shifts as needed if patient unable. Adjust patient position to relieve pressure points, not a full turn   HOB 30 degrees or less     Problem: Nutrition  Goal: Nutrient intake appropriate for maintaining nutritional needs  Outcome: Progressing

## 2025-02-07 NOTE — PROGRESS NOTES
"Physical Therapy    Physical Therapy Treatment    Patient Name: Kimberly Randle \"Sunitha\"  MRN: [id number]  Department: [facility] [id number]  Room: [address] [place] [address]  Today's Date: 2/7/2025  Time Calculation  Start Time: 1533  Stop Time: 1549  Time Calculation (min): 16 min    Assessment/Plan   PT Assessment  PT Assessment Results: Decreased strength, Decreased endurance, Impaired balance, Decreased mobility, Decreased cognition, Impaired judgement, Decreased safety awareness, Orthopedic restrictions  Rehab Prognosis: Good  Barriers to Discharge Home: Cognition needs, Physical needs  Cognition Needs: 24hr supervision for safety awareness needed, Recollection or understanding of precautions/restrictions limited, Insight of patient limited regarding functional ability/needs, Cognition-related high falls risk  Physical Needs: Ambulating household distances limited by function/safety, 24hr mobility assistance needed, High falls risk due to function or environment  Evaluation/Treatment Tolerance: Patient tolerated treatment well  Medical Staff Made Aware: Yes  Strengths: Ability to acquire knowledge  Barriers to Participation: Insight into problems  End of Session Communication: Bedside nurse  Assessment Comment: Pt presents today being able to tolerate slightly further ambulation and BLE ther ex. Pt continues to require increased assist and cueing to safely complete OOB mobility. Continued PT would benefit the pt to progress OOB mobility and safety. At D/C, pt is anticipated to benefit from moderate intensity therapy.  End of Session Patient Position: Bed, 3 rail up, Alarm on  PT Plan  Inpatient/Swing Bed or Outpatient: Inpatient  PT Plan  Treatment/Interventions: Bed mobility, Transfer training, Gait training, Balance training, Strengthening, Therapeutic exercise, Therapeutic activity, Home exercise program, Postural re-education  PT Plan: Ongoing PT  PT Frequency: 3 times per week  PT Discharge Recommendations: Moderate intensity level of " continued care  Equipment Recommended upon Discharge: Wheeled walker  PT Recommended Transfer Status: Assist x1, Assistive device  PT - OK to Discharge: Yes (Per PT POC)    General Visit Information:   PT  Visit  PT Received On: 02/07/25  Response to Previous Treatment: Patient with no complaints from previous session.  General  Reason for Referral: 85 y/o F presenting s/p fall at home and with AMS.  Referred By: JANEE King (APRN-CNP)  Past Medical History Relevant to Rehab: HTN, HLD, anxiety, closed fx of neck of R femur s/p R hemiarthroplast 12/27/24, dementia  Family/Caregiver Present: No  Prior to Session Communication: Bedside nurse  Patient Position Received: Bed, 3 rail up, Alarm on  Preferred Learning Style: auditory, kinesthetic, visual  General Comment: Pt supine in bed upon PT arrival. Cleared to participate with RN, and agreeable to PT treatment.    Subjective   Precautions:  Precautions  Hearing/Visual Limitations: B hearing aides  LE Weight Bearing Status: Weight Bearing as Tolerated (RLE)  Medical Precautions: Fall precautions, Oxygen therapy device and L/min    Objective   Pain:  Pain Assessment  Pain Assessment: 0-10  0-10 (Numeric) Pain Score: 0 - No pain  Cognition:  Cognition  Insight: Severe  Impulsive: Severely  Coordination:  Coordination Comment: Appears intact  Postural Control:  Postural Control  Postural Control: Impaired  Posture Comment: Rounded shoulders  Static Sitting Balance  Static Sitting-Balance Support: Bilateral upper extremity supported, Feet supported  Static Sitting-Level of Assistance: Close supervision  Static Sitting-Comment/Number of Minutes: At the EOB  Static Standing Balance  Static Standing-Balance Support: Bilateral upper extremity supported  Static Standing-Level of Assistance: Minimum assistance  Static Standing-Comment/Number of Minutes: +Gait belt with FWW support  Dynamic Standing Balance  Dynamic Standing-Balance Support: Bilateral upper extremity  supported  Dynamic Standing-Level of Assistance: Moderate assistance  Dynamic Standing-Comments: +Gait belt with FWW support    Activity Tolerance:  Activity Tolerance  Endurance: Tolerates 10 - 20 min exercise with multiple rests  Treatments:  Therapeutic Exercise  Therapeutic Exercise Performed: Yes  Therapeutic Exercise Activity 1: Pt completes 1x10 LAQ, AP, and seated marches. Pt requiring increased VC/TC and visual demonstration to complete. Exercises completed bilaterally    Bed Mobility  Bed Mobility: Yes  Bed Mobility 1  Bed Mobility 1: Supine to sitting  Level of Assistance 1: Contact guard  Bed Mobility Comments 1: HOB elevated. Pt able to progress BLE off the EOB. CGA provided to trunk during raise into sitting.  Bed Mobility 2  Bed Mobility  2: Sitting to supine  Level of Assistance 2: Contact guard  Bed Mobility Comments 2: HOB lowered. Pt able to lift BLE into bed with decreased trunk control on descent to the bed.  Bed Mobility 3  Bed Mobility 3: Rolling right, Rolling left  Level of Assistance 3: Contact guard, Minimal verbal cues, Minimal tactile cues  Bed Mobility Comments 3: Rolling R/L x 1 each direction. CGA at trunk and hips to initiate rolling. VC/TC for reach/grab on bed rail to maintain sidelying position.  Bed Mobility 4  Bed Mobility 4: Scooting  Level of Assistance 4: Maximum assistance  Bed Mobility Comments 4: Assist provided via chux pad for scooting toward the HOB for better positioning in supine.    Ambulation/Gait Training  Ambulation/Gait Training Performed: Yes  Ambulation/Gait Training 1  Surface 1: Level tile  Device 1: Rolling walker  Gait Support Devices: Gait belt  Assistance 1: Moderate assistance, Moderate verbal cues, Moderate tactile cues  Quality of Gait 1: Narrow base of support, Diminished heel strike, Decreased step length, Forward flexed posture  Comments/Distance (ft) 1: 4 steps forward/backward x 2 trials. Increased VC?TC required to initiate LE movement and  sequence with FWW progression. VC i=for increased SAGE for improved balance. Pt significantly flexed at trunk in standing. Assist provided for FWW management.  Transfers  Transfer: Yes  Transfer 1  Transfer From 1: Bed to  Transfer to 1: Stand  Technique 1: Sit to stand  Transfer Device 1: Walker, Gait belt  Transfer Level of Assistance 1: Minimum assistance, Minimal verbal cues, Minimal tactile cues  Trials/Comments 1: VC/TC for hand placement on bed for push to stand instead of resting hands on FWW.  Transfers 2  Transfer From 2: Stand to  Transfer to 2: Bed  Technique 2: Stand to sit  Transfer Device 2: Walker, Gait belt  Transfer Level of Assistance 2: Minimum assistance, Minimal verbal cues, Minimal tactile cues  Trials/Comments 2: VC for BLE positioning against the bed before sitting. VC/TC for BUE reach for the bed for controlled descent.    Stairs  Stairs: No    Outcome Measures:  [facility] Basic Mobility  Turning from your back to your side while in a flat bed without using bedrails: A little  Moving from lying on your back to sitting on the side of a flat bed without using bedrails: A little  Moving to and from bed to chair (including a wheelchair): A lot  Standing up from a chair using your arms (e.g. wheelchair or bedside chair): A little  To walk in hospital room: A lot  Climbing 3-5 steps with railing: Total  Basic Mobility - Total Score: 14    Education Documentation  Precautions, taught by Goldy Burleson PT at 2/7/2025  4:33 PM.  Learner: Patient  Readiness: Acceptance  Method: Explanation, Demonstration  Response: Demonstrated Understanding    Body Mechanics, taught by Goldy Burleson PT at 2/7/2025  4:33 PM.  Learner: Patient  Readiness: Acceptance  Method: Explanation, Demonstration  Response: Demonstrated Understanding    Mobility Training, taught by Godly Burleson PT at 2/7/2025  4:33 PM.  Learner: Patient  Readiness: Acceptance  Method: Explanation, Demonstration  Response: Demonstrated  Understanding    Encounter Problems       Encounter Problems (Active)       Balance       Goal 1       Start:  02/05/25    Expected End:  02/19/25       Pt performs all sitting balance with supervision and standing balance with CGA using FWW            Mobility       STG - Patient will ambulate (Progressing)       Start:  02/05/25    Expected End:  02/19/25       20 ft with min assist x 1 using FWW            PT Transfers       STG - Patient to transfer to and from sit to supine (Progressing)       Start:  02/05/25    Expected End:  02/19/25       With CGA to/from a flat bed         STG - Patient will transfer sit to and from stand (Progressing)       Start:  02/05/25    Expected End:  02/19/25       Safely, with CGA using FWW and proper body mechanics

## 2025-02-07 NOTE — PROGRESS NOTES
"INPATIENT PROGRESS NOTES    PRIMARY SERVICE: Sandoavl Crisostomo MD   2/7/2025  8:24 AM    INTERVAL HPI: Pt feels OK, she is resting easily arousable.  She states that she does not have any pain or difficulty breathing discussed with the nursing according to the nursing patient did not sleep overnight          MEDICATIONS:  Scheduled medications  aspirin, 81 mg, oral, Daily  cyclobenzaprine, 10 mg, oral, Nightly  heparin (porcine), 5,000 Units, subcutaneous, q8h NILAY  losartan, 25 mg, oral, Daily  pantoprazole, 40 mg, oral, Daily before breakfast  polyethylene glycol, 17 g, oral, Daily  QUEtiapine, 12.5 mg, oral, Once  QUEtiapine, 25 mg, oral, BID      Continuous medications     PRN medications  PRN medications: acetaminophen **OR** acetaminophen **OR** acetaminophen, ondansetron **OR** ondansetron      PHYSICAL EXAM:       2/6/2025     3:32 AM 2/6/2025     7:48 AM 2/6/2025    11:54 AM 2/6/2025    12:37 PM 2/6/2025     4:02 PM 2/6/2025     8:02 PM 2/7/2025     1:00 AM   Vitals   Systolic 143 155 153  119 133    Diastolic 90 74 78  65 65    BP Location Right arm Right arm Right arm  Right arm Right arm    Heart Rate 93 93 90  90 83    Temp 36.3 °C (97.3 °F) 36.3 °C (97.3 °F) 35.8 °C (96.4 °F)  35.9 °C (96.6 °F) 35.6 °C (96.1 °F) --   Resp 18 17 17  17 18    Height    1.626 m (5' 4.02\")      Weight (lb)    102.96      BMI    17.66 kg/m2      BSA (m2)    1.45 m2             PHYSICAL EXAMINATION:    General appearance: well appearing  Skin: skin color, texture, turgor normal,   HEENT: Anicteric sclera.  Oropharynx mucosa moist  Neck: Supple, no adenopathy;   Back: no pain to palpation over spine or costovertebral angles,   Lungs: clear to auscultation, no wheezing or rhonchi  Heart: RRR without murmur, gallop, or rubs.   Abdomen: Abdomen soft, non-tender. Bowel sounds normal. No masses, organomegaly  Extremities: Extremities normal. No  edema, or skin discoloration.   Musculoskeletal: Spine range of motion normal. Muscular " strength intact  Neuro: Oriented X  0    DATA: CBC, Coags, BMP, Mg, Phos   Scheduled medications  aspirin, 81 mg, oral, Daily  cyclobenzaprine, 10 mg, oral, Nightly  heparin (porcine), 5,000 Units, subcutaneous, q8h NILAY  losartan, 25 mg, oral, Daily  pantoprazole, 40 mg, oral, Daily before breakfast  polyethylene glycol, 17 g, oral, Daily  QUEtiapine, 12.5 mg, oral, Once  QUEtiapine, 25 mg, oral, BID      Continuous medications     PRN medications  PRN medications: acetaminophen **OR** acetaminophen **OR** acetaminophen, ondansetron **OR** ondansetron  Results for orders placed or performed during the hospital encounter of 02/04/25 (from the past 96 hours)   CBC and Auto Differential   Result Value Ref Range    WBC 12.2 (H) 4.4 - 11.3 x10*3/uL    nRBC 0.0 0.0 - 0.0 /100 WBCs    RBC 3.68 (L) 4.00 - 5.20 x10*6/uL    Hemoglobin 11.3 (L) 12.0 - 16.0 g/dL    Hematocrit 33.7 (L) 36.0 - 46.0 %    MCV 92 80 - 100 fL    MCH 30.7 26.0 - 34.0 pg    MCHC 33.5 32.0 - 36.0 g/dL    RDW 15.1 (H) 11.5 - 14.5 %    Platelets 216 150 - 450 x10*3/uL    Neutrophils % 93.9 40.0 - 80.0 %    Immature Granulocytes %, Automated 0.7 0.0 - 0.9 %    Lymphocytes % 1.8 13.0 - 44.0 %    Monocytes % 3.1 2.0 - 10.0 %    Eosinophils % 0.2 0.0 - 6.0 %    Basophils % 0.3 0.0 - 2.0 %    Neutrophils Absolute 11.40 (H) 1.60 - 5.50 x10*3/uL    Immature Granulocytes Absolute, Automated 0.09 0.00 - 0.50 x10*3/uL    Lymphocytes Absolute 0.22 (L) 0.80 - 3.00 x10*3/uL    Monocytes Absolute 0.38 0.05 - 0.80 x10*3/uL    Eosinophils Absolute 0.02 0.00 - 0.40 x10*3/uL    Basophils Absolute 0.04 0.00 - 0.10 x10*3/uL   Basic metabolic panel   Result Value Ref Range    Glucose 97 74 - 99 mg/dL    Sodium 133 (L) 136 - 145 mmol/L    Potassium 4.0 3.5 - 5.3 mmol/L    Chloride 102 98 - 107 mmol/L    Bicarbonate 23 21 - 32 mmol/L    Anion Gap 12 10 - 20 mmol/L    Urea Nitrogen 20 6 - 23 mg/dL    Creatinine 0.95 0.50 - 1.05 mg/dL    eGFR 58 (L) >60 mL/min/1.73m*2    Calcium  8.9 8.6 - 10.3 mg/dL   Hepatic function panel   Result Value Ref Range    Albumin 3.6 3.4 - 5.0 g/dL    Bilirubin, Total 0.5 0.0 - 1.2 mg/dL    Bilirubin, Direct 0.1 0.0 - 0.3 mg/dL    Alkaline Phosphatase 108 33 - 136 U/L    ALT 19 7 - 45 U/L    AST 25 9 - 39 U/L    Total Protein 6.1 (L) 6.4 - 8.2 g/dL   Sars-CoV-2 PCR   Result Value Ref Range    Coronavirus 2019, PCR Not Detected Not Detected   RSV PCR   Result Value Ref Range    RSV PCR Not Detected Not Detected   Influenza A, and B PCR   Result Value Ref Range    Flu A Result Not Detected Not Detected    Flu B Result Not Detected Not Detected   Urinalysis with Reflex Culture and Microscopic   Result Value Ref Range    Color, Urine Yellow Light-Yellow, Yellow, Dark-Yellow    Appearance, Urine Clear Clear    Specific Gravity, Urine 1.015 1.005 - 1.035    pH, Urine 7.0 5.0, 5.5, 6.0, 6.5, 7.0, 7.5, 8.0    Protein, Urine NEGATIVE NEGATIVE, 10 (TRACE), 20 (TRACE) mg/dL    Glucose, Urine Normal Normal mg/dL    Blood, Urine NEGATIVE NEGATIVE mg/dL    Ketones, Urine 10 (1+) (A) NEGATIVE mg/dL    Bilirubin, Urine NEGATIVE NEGATIVE mg/dL    Urobilinogen, Urine Normal Normal mg/dL    Nitrite, Urine NEGATIVE NEGATIVE    Leukocyte Esterase, Urine 75 Milo/uL (A) NEGATIVE   Blood Culture    Specimen: Peripheral Arterial Puncture; Blood culture   Result Value Ref Range    Blood Culture No growth at 1 day    Blood Culture    Specimen: Peripheral Arterial Puncture; Blood culture   Result Value Ref Range    Blood Culture No growth at 1 day    Microscopic Only, Urine   Result Value Ref Range    WBC, Urine 21-50 (A) 1-5, NONE /HPF    RBC, Urine 1-2 NONE, 1-2, 3-5 /HPF   Urine Culture    Specimen: Clean Catch/Voided; Urine   Result Value Ref Range    Urine Culture No growth    ECG 12 lead   Result Value Ref Range    Ventricular Rate 88 BPM    Atrial Rate 88 BPM    IN Interval 134 ms    QRS Duration 80 ms    QT Interval 352 ms    QTC Calculation(Bazett) 425 ms    P Axis 47 degrees    R  Axis -44 degrees    T Axis 63 degrees    QRS Count 15 beats    Q Onset 230 ms    P Onset 163 ms    P Offset 208 ms    T Offset 406 ms    QTC Fredericia 400 ms   CBC   Result Value Ref Range    WBC 7.9 4.4 - 11.3 x10*3/uL    nRBC 0.0 0.0 - 0.0 /100 WBCs    RBC 3.95 (L) 4.00 - 5.20 x10*6/uL    Hemoglobin 12.1 12.0 - 16.0 g/dL    Hematocrit 36.8 36.0 - 46.0 %    MCV 93 80 - 100 fL    MCH 30.6 26.0 - 34.0 pg    MCHC 32.9 32.0 - 36.0 g/dL    RDW 15.2 (H) 11.5 - 14.5 %    Platelets 218 150 - 450 x10*3/uL   Basic metabolic panel   Result Value Ref Range    Glucose 81 74 - 99 mg/dL    Sodium 135 (L) 136 - 145 mmol/L    Potassium 3.7 3.5 - 5.3 mmol/L    Chloride 100 98 - 107 mmol/L    Bicarbonate 27 21 - 32 mmol/L    Anion Gap 12 10 - 20 mmol/L    Urea Nitrogen 16 6 - 23 mg/dL    Creatinine 0.95 0.50 - 1.05 mg/dL    eGFR 58 (L) >60 mL/min/1.73m*2    Calcium 9.3 8.6 - 10.3 mg/dL   CBC   Result Value Ref Range    WBC 6.6 4.4 - 11.3 x10*3/uL    nRBC 0.0 0.0 - 0.0 /100 WBCs    RBC 4.31 4.00 - 5.20 x10*6/uL    Hemoglobin 12.8 12.0 - 16.0 g/dL    Hematocrit 40.0 36.0 - 46.0 %    MCV 93 80 - 100 fL    MCH 29.7 26.0 - 34.0 pg    MCHC 32.0 32.0 - 36.0 g/dL    RDW 14.9 (H) 11.5 - 14.5 %    Platelets 256 150 - 450 x10*3/uL   Basic metabolic panel   Result Value Ref Range    Glucose 112 (H) 74 - 99 mg/dL    Sodium 140 136 - 145 mmol/L    Potassium 4.1 3.5 - 5.3 mmol/L    Chloride 106 98 - 107 mmol/L    Bicarbonate 24 21 - 32 mmol/L    Anion Gap 14 10 - 20 mmol/L    Urea Nitrogen 20 6 - 23 mg/dL    Creatinine 0.84 0.50 - 1.05 mg/dL    eGFR 68 >60 mL/min/1.73m*2    Calcium 9.6 8.6 - 10.3 mg/dL           ASSESSMENT AND PLAN:     Assessment & Plan  Fall at home, initial encounter    Fall, initial encounter    Recent fall and a right hemiarthroplasty of the hip    Altered mental status her blood culture and urine cultures so far negative.  Noted that she had did have a positive urine culture on January 31, 2025.  With E. coli.  CT brain on  admission without any acute etiology  Chest x-ray without infiltrate  Also the COVID and flu is negative.    Psychiatry has been consulted noted input.  Will continue with the Seroquel.  Add melatonin  Hold off on the Flexeril      Plan of care discussed with: Provider, RN, Patient.    Sandoval Crisostomo MD     Office: [phone]

## 2025-02-07 NOTE — NURSING NOTE
1600 -- Patient's fingernail beds were cyanotic. Pulse ox check was 84% on RA.  Fingers cold to touch, tried rubbing them to warm them up. Pulse ox 90%. Patient placed on 2L NC. Dr. Crisostomo notified. Order for O2 placed. Pulse ox with 2L 94%, fingernail beds are now pink.     1620 -- Called pt's daughter Donna to make her aware of the situation.     Donna would like to updated regularly by the nurses.

## 2025-02-07 NOTE — PROGRESS NOTES
"Occupational Therapy    Occupational Therapy Treatment    Name: Kimberly Randle \"Sunitha\"  MRN: [id number]  Department: [facility] [id number]  Room: [address] [place] [address]  Date: 02/07/25  Time Calculation  Start Time: 1523  Stop Time: 1535  Time Calculation (min): 12 min    Assessment:  OT Assessment:  (Patient seen for follow up treatment, patient is weak and deconditioned. Patient with decreased cognition, decreased ADLs, decreased transfers, decreased standing balance. Moderate intensity therapy to maximize functional independence.)  Prognosis: Good  Barriers to Discharge Home: Cognition needs, Physical needs  Cognition Needs: Recollection or understanding of precautions/restrictions limited  Physical Needs: 24hr mobility assistance needed, 24hr ADL assistance needed, Ambulating household distances limited by function/safety  Evaluation/Treatment Tolerance: Patient limited by fatigue  Medical Staff Made Aware: Yes  End of Session Communication: Bedside nurse  End of Session Patient Position: Bed, 3 rail up, Alarm on  Plan:  Treatment Interventions: ADL retraining, Functional transfer training, Endurance training, Patient/family training, Neuromuscular reeducation  OT Frequency: 3 times per week  OT Discharge Recommendations: Moderate intensity level of continued care  Equipment Recommended upon Discharge: Wheeled walker  OT Recommended Transfer Status: Moderate assist, Assist of 1  OT - OK to Discharge: Yes (Per POC)    Subjective   Previous Visit Info:  OT Last Visit  OT Received On: 02/07/25  General:  General  Reason for Referral: 85 y/o F presenting s/p fall at home and with AMS.  Referred By: JANEE King (APRN-CNP)  Past Medical History Relevant to Rehab: HTN, HLD, anxiety, closed fx of neck of R femur s/p R hemiarthroplast 12/27/24, dementia  Family/Caregiver Present: No  Prior to Session Communication: Bedside nurse  Patient Position Received: Bed, 3 rail up  General Comment:  (Patient is pleasant and confused. Patient with decreased " insight. Max cues for safety and processing skills.)  Precautions:  Hearing/Visual Limitations:  (B Hearing Aides)  LE Weight Bearing Status: Weight Bearing as Tolerated (R LE)  Medical Precautions: Fall precautions  Post-Surgical Precautions: Right hip precautions    Pain Assessment:  Pain Assessment  Pain Assessment: 0-10  0-10 (Numeric) Pain Score: 0 - No pain     Objective   Cognition:  Overall Cognitive Status: Impaired  Orientation Level: Disoriented to place, Disoriented to time, Disoriented to situation  Cognition Comments:  (Max redirection to task and cues for sequencing.)  Sustained Attention: Impaired (Frequent redirection to task)  Safety/Judgement:  (Decreased insight)  Insight: Severe  Impulsive: Severely    Activities of Daily Living:    Grooming  Grooming Level of Assistance: Minimum assistance  Grooming Where Assessed: Edge of bed  Grooming Comments:  (To wash face)    UE Dressing  UE Dressing Level of Assistance: Moderate assistance  UE Dressing Where Assessed: Edge of bed  UE Dressing Comments:  (Assist with hospital gown around shoulders.)    LE Dressing  LE Dressing: Yes  LE Dressing Adaptive Equipment: Sock aide  Sock Level of Assistance: Moderate assistance  LE Dressing Where Assessed: Edge of bed  LE Dressing Comments:  (Patient educated regarding use of LE adaptive equipment in order to don socks. Socks donned with sock aide, max hand over hand assist required.)    Bed Mobility/Transfers: Bed Mobility  Bed Mobility: Yes  Bed Mobility 1  Bed Mobility 1: Supine to sitting  Level of Assistance 1: Minimum assistance  Bed Mobility Comments 1:  (Assist with LEs off of bed.)  Bed Mobility 2  Bed Mobility  2: Sitting to supine  Level of Assistance 2: Moderate assistance  Bed Mobility Comments 2:  (Assist with upper trunk and LEs on bed.)    Transfers  Transfer: Yes  Transfer 1  Transfer From 1: Sit to  Transfer to 1: Stand  Technique 1: Sit to stand  Transfer Device 1: Walker  Transfer Level of  Assistance 1: Moderate assistance  Trials/Comments 1:  (Cues for hand placement, Max hand over hand assist. Patient able to take 2 sidesteps towards head of bed.)    Sitting Balance:  Static Sitting Balance  Static Sitting-Balance Support: Feet supported  Static Sitting-Level of Assistance: Contact guard  Dynamic Sitting Balance  Dynamic Sitting-Balance Support: Feet supported  Dynamic Sitting-Level of Assistance: Minimum assistance  Dynamic Sitting-Balance: Reaching for objects  Standing Balance:  Static Standing Balance  Static Standing-Balance Support: Bilateral upper extremity supported  Static Standing-Level of Assistance: Moderate assistance  Static Standing-Comment/Number of Minutes:  (Patient is unsteady on feet.)  Dynamic Standing Balance  Dynamic Standing-Balance Support: Bilateral upper extremity supported  Dynamic Standing-Level of Assistance: Moderate assistance  Dynamic Standing-Balance: Reaching for objects    Outcome Measures:  [facility] Daily Activity  Putting on and taking off regular lower body clothing: A lot  Bathing (including washing, rinsing, drying): A lot  Putting on and taking off regular upper body clothing: A lot  Toileting, which includes using toilet, bedpan or urinal: A lot  Taking care of personal grooming such as brushing teeth: A little  Eating Meals: None  Daily Activity - Total Score: 15    Goals:  Encounter Problems       Encounter Problems (Active)       ADLs       Patient will perform UB and LB bathing  with stand by assist level of assistance.  (Progressing)       Start:  02/05/25    Expected End:  02/19/25            Patient with complete upper body dressing with stand by assist level of assistance donning and doffing all UE clothes with no adaptive equipment while edge of bed  (Progressing)       Start:  02/05/25    Expected End:  02/19/25            Patient with complete lower body dressing with minimal assist  level of assistance donning and doffing all LE clothes  with  reacher, sock-aid, and dressing stick  while edge of bed  (Progressing)       Start:  02/05/25    Expected End:  02/19/25            Patient will complete daily grooming tasks brushing teeth and washing face/hair with supervision level of assistance and PRN adaptive equipment while standing. (Progressing)       Start:  02/05/25    Expected End:  02/19/25               BALANCE       Patientt will maintain static standing balance during ADL task with supervision level of assistance drop down in order to demonstrate decreased risk of falling and improved postural control. (Progressing)       Start:  02/05/25    Expected End:  02/19/25               COGNITION/SAFETY       Patient to adhere to hip precautions with minimal verbal cues and demonstrate good safety awareness during ADLs and transfers.  (Progressing)       Start:  02/05/25    Expected End:  02/19/25               TRANSFERS       Patient will perform bed mobility supervision level of assistance and bed rails in order to improve safety and independence with mobility (Progressing)       Start:  02/05/25    Expected End:  02/19/25            Patient will complete functional transfer to all surfaces with front wheeled walker with stand by assist level of assistance. (Progressing)       Start:  02/05/25    Expected End:  02/19/25

## 2025-02-08 LAB
ANION GAP SERPL CALC-SCNC: 11 MMOL/L (ref 10–20)
APPEARANCE UR: CLEAR
BILIRUB UR STRIP.AUTO-MCNC: NEGATIVE MG/DL
BUN SERPL-MCNC: 37 MG/DL (ref 6–23)
CALCIUM SERPL-MCNC: 9 MG/DL (ref 8.6–10.3)
CHLORIDE SERPL-SCNC: 109 MMOL/L (ref 98–107)
CO2 SERPL-SCNC: 23 MMOL/L (ref 21–32)
COLOR UR: NORMAL
CREAT SERPL-MCNC: 0.85 MG/DL (ref 0.5–1.05)
EGFRCR SERPLBLD CKD-EPI 2021: 67 ML/MIN/1.73M*2
ERYTHROCYTE [DISTWIDTH] IN BLOOD BY AUTOMATED COUNT: 14.9 % (ref 11.5–14.5)
GLUCOSE SERPL-MCNC: 107 MG/DL (ref 74–99)
GLUCOSE UR STRIP.AUTO-MCNC: NORMAL MG/DL
HCT VFR BLD AUTO: 40.9 % (ref 36–46)
HGB BLD-MCNC: 13.2 G/DL (ref 12–16)
KETONES UR STRIP.AUTO-MCNC: NEGATIVE MG/DL
LEUKOCYTE ESTERASE UR QL STRIP.AUTO: NEGATIVE
MCH RBC QN AUTO: 30.6 PG (ref 26–34)
MCHC RBC AUTO-ENTMCNC: 32.3 G/DL (ref 32–36)
MCV RBC AUTO: 95 FL (ref 80–100)
NITRITE UR QL STRIP.AUTO: NEGATIVE
NRBC BLD-RTO: 0 /100 WBCS (ref 0–0)
PH UR STRIP.AUTO: 6 [PH]
PLATELET # BLD AUTO: 254 X10*3/UL (ref 150–450)
POTASSIUM SERPL-SCNC: 4.1 MMOL/L (ref 3.5–5.3)
PROT UR STRIP.AUTO-MCNC: NEGATIVE MG/DL
RBC # BLD AUTO: 4.32 X10*6/UL (ref 4–5.2)
RBC # UR STRIP.AUTO: NEGATIVE MG/DL
SODIUM SERPL-SCNC: 139 MMOL/L (ref 136–145)
SP GR UR STRIP.AUTO: 1.02
UROBILINOGEN UR STRIP.AUTO-MCNC: NORMAL MG/DL
WBC # BLD AUTO: 5.7 X10*3/UL (ref 4.4–11.3)

## 2025-02-08 PROCEDURE — 81003 URINALYSIS AUTO W/O SCOPE: CPT | Performed by: INTERNAL MEDICINE

## 2025-02-08 PROCEDURE — 2500000005 HC RX 250 GENERAL PHARMACY W/O HCPCS: Performed by: FAMILY MEDICINE

## 2025-02-08 PROCEDURE — 99232 SBSQ HOSP IP/OBS MODERATE 35: CPT | Performed by: INTERNAL MEDICINE

## 2025-02-08 PROCEDURE — 2500000001 HC RX 250 WO HCPCS SELF ADMINISTERED DRUGS (ALT 637 FOR MEDICARE OP)

## 2025-02-08 PROCEDURE — 36415 COLL VENOUS BLD VENIPUNCTURE: CPT | Performed by: FAMILY MEDICINE

## 2025-02-08 PROCEDURE — 85027 COMPLETE CBC AUTOMATED: CPT | Performed by: FAMILY MEDICINE

## 2025-02-08 PROCEDURE — 2500000004 HC RX 250 GENERAL PHARMACY W/ HCPCS (ALT 636 FOR OP/ED)

## 2025-02-08 PROCEDURE — 2500000002 HC RX 250 W HCPCS SELF ADMINISTERED DRUGS (ALT 637 FOR MEDICARE OP, ALT 636 FOR OP/ED): Performed by: INTERNAL MEDICINE

## 2025-02-08 PROCEDURE — 1200000002 HC GENERAL ROOM WITH TELEMETRY DAILY

## 2025-02-08 PROCEDURE — 2500000002 HC RX 250 W HCPCS SELF ADMINISTERED DRUGS (ALT 637 FOR MEDICARE OP, ALT 636 FOR OP/ED)

## 2025-02-08 PROCEDURE — 82374 ASSAY BLOOD CARBON DIOXIDE: CPT | Performed by: FAMILY MEDICINE

## 2025-02-08 PROCEDURE — 51701 INSERT BLADDER CATHETER: CPT

## 2025-02-08 RX ORDER — TAMSULOSIN HYDROCHLORIDE 0.4 MG/1
0.4 CAPSULE ORAL DAILY
Status: DISCONTINUED | OUTPATIENT
Start: 2025-02-08 | End: 2025-02-13 | Stop reason: HOSPADM

## 2025-02-08 RX ADMIN — ACETAMINOPHEN 650 MG: 325 TABLET, FILM COATED ORAL at 21:12

## 2025-02-08 RX ADMIN — HEPARIN SODIUM 5000 UNITS: 5000 INJECTION, SOLUTION INTRAVENOUS; SUBCUTANEOUS at 10:18

## 2025-02-08 RX ADMIN — PANTOPRAZOLE SODIUM 40 MG: 40 TABLET, DELAYED RELEASE ORAL at 10:18

## 2025-02-08 RX ADMIN — ASPIRIN 81 MG: 81 TABLET, COATED ORAL at 10:18

## 2025-02-08 RX ADMIN — LOSARTAN POTASSIUM 25 MG: 25 TABLET, FILM COATED ORAL at 10:18

## 2025-02-08 RX ADMIN — HEPARIN SODIUM 5000 UNITS: 5000 INJECTION, SOLUTION INTRAVENOUS; SUBCUTANEOUS at 15:22

## 2025-02-08 RX ADMIN — Medication 3 MG: at 21:08

## 2025-02-08 RX ADMIN — HEPARIN SODIUM 5000 UNITS: 5000 INJECTION, SOLUTION INTRAVENOUS; SUBCUTANEOUS at 21:08

## 2025-02-08 RX ADMIN — TAMSULOSIN HYDROCHLORIDE 0.4 MG: 0.4 CAPSULE ORAL at 15:22

## 2025-02-08 RX ADMIN — QUETIAPINE FUMARATE 25 MG: 25 TABLET ORAL at 10:18

## 2025-02-08 RX ADMIN — QUETIAPINE FUMARATE 25 MG: 25 TABLET ORAL at 21:08

## 2025-02-08 ASSESSMENT — COGNITIVE AND FUNCTIONAL STATUS - GENERAL
MOVING TO AND FROM BED TO CHAIR: A LOT
DAILY ACTIVITIY SCORE: 16
MOVING FROM LYING ON BACK TO SITTING ON SIDE OF FLAT BED WITH BEDRAILS: A LITTLE
MOVING FROM LYING ON BACK TO SITTING ON SIDE OF FLAT BED WITH BEDRAILS: A LITTLE
TURNING FROM BACK TO SIDE WHILE IN FLAT BAD: A LITTLE
HELP NEEDED FOR BATHING: A LITTLE
MOBILITY SCORE: 13
STANDING UP FROM CHAIR USING ARMS: A LOT
DRESSING REGULAR LOWER BODY CLOTHING: A LOT
MOBILITY SCORE: 15
DAILY ACTIVITIY SCORE: 19
MOVING TO AND FROM BED TO CHAIR: A LITTLE
TURNING FROM BACK TO SIDE WHILE IN FLAT BAD: A LITTLE
PERSONAL GROOMING: A LITTLE
WALKING IN HOSPITAL ROOM: A LOT
PERSONAL GROOMING: A LITTLE
CLIMB 3 TO 5 STEPS WITH RAILING: TOTAL
WALKING IN HOSPITAL ROOM: A LOT
DRESSING REGULAR UPPER BODY CLOTHING: A LITTLE
DRESSING REGULAR UPPER BODY CLOTHING: A LOT
HELP NEEDED FOR BATHING: A LOT
CLIMB 3 TO 5 STEPS WITH RAILING: TOTAL
DRESSING REGULAR LOWER BODY CLOTHING: A LOT
TOILETING: A LITTLE
STANDING UP FROM CHAIR USING ARMS: A LITTLE

## 2025-02-08 ASSESSMENT — PAIN SCALES - GENERAL
PAINLEVEL_OUTOF10: 0 - NO PAIN
PAINLEVEL_OUTOF10: 4
PAINLEVEL_OUTOF10: 0 - NO PAIN

## 2025-02-08 ASSESSMENT — PAIN - FUNCTIONAL ASSESSMENT
PAIN_FUNCTIONAL_ASSESSMENT: 0-10

## 2025-02-08 NOTE — PROGRESS NOTES
02/08/25 1032   Discharge Planning   Type of Post Acute Facility Services Assisted living         Patient was recmod by PT/OT. Called PETROS Dukes ( [phone]) and left her message that patient was rec mod and if any barrier to return. Waiting for call back before calling family. Will continue to follow for discharge needs.

## 2025-02-08 NOTE — CARE PLAN
Problem: Discharge Planning  Goal: Discharge to home or other facility with appropriate resources  Outcome: Progressing     Problem: Chronic Conditions and Co-morbidities  Goal: Patient's chronic conditions and co-morbidity symptoms are monitored and maintained or improved  Outcome: Progressing     Problem: Nutrition  Goal: Nutrient intake appropriate for maintaining nutritional needs  Outcome: Progressing     Problem: Skin  Goal: Participates in plan/prevention/treatment measures  Outcome: Progressing  Flowsheets (Taken 2/7/2025 2220)  Participates in plan/prevention/treatment measures: Elevate heels  Goal: Prevent/manage excess moisture  Outcome: Progressing  Flowsheets (Taken 2/7/2025 2220)  Prevent/manage excess moisture: Moisturize dry skin  Goal: Prevent/minimize sheer/friction injuries  Outcome: Progressing  Flowsheets (Taken 2/7/2025 2220)  Prevent/minimize sheer/friction injuries: Use pull sheet  Goal: Promote/optimize nutrition  Outcome: Progressing  Flowsheets (Taken 2/7/2025 2220)  Promote/optimize nutrition: Offer water/supplements/favorite foods  Goal: Promote skin healing  Outcome: Progressing  Flowsheets (Taken 2/7/2025 2220)  Promote skin healing: Protective dressings over bony prominences

## 2025-02-08 NOTE — CARE PLAN
The patient's goals for the shift include      The clinical goals for the shift include Pt. will maintain safethy throughout shift.      Problem: Discharge Planning  Goal: Discharge to home or other facility with appropriate resources  Outcome: Progressing     Problem: Chronic Conditions and Co-morbidities  Goal: Patient's chronic conditions and co-morbidity symptoms are monitored and maintained or improved  Outcome: Progressing     Problem: Nutrition  Goal: Nutrient intake appropriate for maintaining nutritional needs  Outcome: Progressing     Problem: Skin  Goal: Participates in plan/prevention/treatment measures  Outcome: Progressing  Goal: Prevent/manage excess moisture  Outcome: Progressing  Goal: Prevent/minimize sheer/friction injuries  Outcome: Progressing  Flowsheets (Taken 2/8/2025 8572)  Prevent/minimize sheer/friction injuries: Complete micro-shifts as needed if patient unable. Adjust patient position to relieve pressure points, not a full turn  Goal: Promote/optimize nutrition  Outcome: Progressing  Goal: Promote skin healing  Outcome: Progressing     Problem: Safety - Medical Restraint  Goal: Free from restraint(s) (Restraint for Interference with Medical Device)  Outcome: Progressing

## 2025-02-08 NOTE — CARE PLAN
Problem: Safety - Medical Restraint  Goal: Free from restraint(s) (Restraint for Interference with Medical Device)  2/7/2025 2322 by Miguel Angel Mcgarry RN  Outcome: Progressing  2/7/2025 2322 by Miguel Angel Mcgarry RN  Reactivated

## 2025-02-08 NOTE — PROGRESS NOTES
Kimberly Randle is a 84 y.o. female     Weekend coverage    Patient was very agitated this morning  Appeared to settle down after she had urinated  I obtain a bladder scan which showed significant retention  Will ask the nurse to straight cath and start Flomax and also get a urinalysis    Review of Systems         Vitals:    02/08/25 1209   BP: 147/68   Pulse: 93   Resp: 17   Temp: 36 °C (96.8 °F)   SpO2: 97%        Scheduled medications  aspirin, 81 mg, oral, Daily  heparin (porcine), 5,000 Units, subcutaneous, q8h NILAY  losartan, 25 mg, oral, Daily  melatonin, 3 mg, oral, Nightly  pantoprazole, 40 mg, oral, Daily before breakfast  polyethylene glycol, 17 g, oral, Daily  QUEtiapine, 25 mg, oral, BID  tamsulosin, 0.4 mg, oral, Daily      Continuous medications     PRN medications  PRN medications: acetaminophen **OR** acetaminophen **OR** acetaminophen, ondansetron **OR** ondansetron    Lab Review   Results from last 7 days   Lab Units 02/08/25 0459 02/06/25 0427 02/05/25  0344   WBC AUTO x10*3/uL 5.7 6.6 7.9   HEMOGLOBIN g/dL 13.2 12.8 12.1   HEMATOCRIT % 40.9 40.0 36.8   PLATELETS AUTO x10*3/uL 254 256 218     Results from last 7 days   Lab Units 02/08/25  0459 02/06/25  0427 02/05/25  0344 02/04/25  1425   SODIUM mmol/L 139 140 135* 133*   POTASSIUM mmol/L 4.1 4.1 3.7 4.0   CHLORIDE mmol/L 109* 106 100 102   CO2 mmol/L 23 24 27 23   BUN mg/dL 37* 20 16 20   CREATININE mg/dL 0.85 0.84 0.95 0.95   CALCIUM mg/dL 9.0 9.6 9.3 8.9   PROTEIN TOTAL g/dL  --   --   --  6.1*   BILIRUBIN TOTAL mg/dL  --   --   --  0.5   ALK PHOS U/L  --   --   --  108   ALT U/L  --   --   --  19   AST U/L  --   --   --  25   GLUCOSE mg/dL 107* 112* 81 97            XR chest 1 view   Final Result   No acute cardiopulmonary disease.        MACRO:   none        Signed by: Nova Otero 2/4/2025 4:33 PM   Dictation workstation:   [id number]      CT head wo IV contrast   Final Result   No CT evidence for acute intracranial pathology.         No significant interval change since the prior study.             Signed by: Richi Salgado 2/4/2025 3:32 PM   Dictation workstation:   [id number]      XR hip right with pelvis when performed 2 or 3 views   Final Result   1. No acute osseous abnormality about the right total hip   arthroplasty.        Signed by: Rodolfo Austin 2/4/2025 3:00 PM   Dictation workstation:   [id number]      XR femur right 2+ views   Final Result   Intact right hip hemiarthroplasty. No acute fracture or dislocation.        MACRO:   None        Signed by: Dylan Bianchi 2/4/2025 3:06 PM   Dictation workstation:   [id number]            Physical Exam    Constitutional   General appearance: Awake  Pulmonary   Respiratory assessment: No respiratory distress, normal respiratory rhythm and effort.    Auscultation of Lungs: Clear bilateral breath sounds.   Cardiovascular   Auscultation of heart: Apical pulse normal, heart rate and rhythm normal, normal S1 and S2, no murmurs and no pericardial rub.    Exam for edema: No peripheral edema.   Abdomen   Abdominal Exam: No bruits, normal bowel sounds, soft, non-tender, no abdominal mass palpated.    Liver and Spleen exam: No hepato-splenomegaly.   Musculoskeletal     Inspection/palpation of joints, bones and muscles: No joint swelling. Normal movement of all extremities.   Neurologic   Cranial nerves: Nerves 2-12 were intact,         Assessment/Plan      #Acute encephalopathy  #Urine retention  With straight cath  Starting Flomax and repeat urinalysis  Continue Seroquel    #Fall with status post right hip hemiarthroplasty  Fall risk

## 2025-02-09 VITALS
TEMPERATURE: 96.8 F | HEART RATE: 86 BPM | DIASTOLIC BLOOD PRESSURE: 66 MMHG | WEIGHT: 102.95 LBS | BODY MASS INDEX: 17.58 KG/M2 | HEIGHT: 64 IN | RESPIRATION RATE: 16 BRPM | SYSTOLIC BLOOD PRESSURE: 123 MMHG | OXYGEN SATURATION: 97 %

## 2025-02-09 LAB
ANION GAP SERPL CALC-SCNC: 12 MMOL/L (ref 10–20)
BACTERIA BLD CULT: NORMAL
BACTERIA BLD CULT: NORMAL
BUN SERPL-MCNC: 36 MG/DL (ref 6–23)
CALCIUM SERPL-MCNC: 9 MG/DL (ref 8.6–10.3)
CHLORIDE SERPL-SCNC: 108 MMOL/L (ref 98–107)
CO2 SERPL-SCNC: 23 MMOL/L (ref 21–32)
CREAT SERPL-MCNC: 0.76 MG/DL (ref 0.5–1.05)
EGFRCR SERPLBLD CKD-EPI 2021: 76 ML/MIN/1.73M*2
ERYTHROCYTE [DISTWIDTH] IN BLOOD BY AUTOMATED COUNT: 14.8 % (ref 11.5–14.5)
GLUCOSE SERPL-MCNC: 106 MG/DL (ref 74–99)
HCT VFR BLD AUTO: 40.8 % (ref 36–46)
HGB BLD-MCNC: 13 G/DL (ref 12–16)
HOLD SPECIMEN: NORMAL
MCH RBC QN AUTO: 30.6 PG (ref 26–34)
MCHC RBC AUTO-ENTMCNC: 31.9 G/DL (ref 32–36)
MCV RBC AUTO: 96 FL (ref 80–100)
NRBC BLD-RTO: 0 /100 WBCS (ref 0–0)
PLATELET # BLD AUTO: 241 X10*3/UL (ref 150–450)
POTASSIUM SERPL-SCNC: 3.8 MMOL/L (ref 3.5–5.3)
RBC # BLD AUTO: 4.25 X10*6/UL (ref 4–5.2)
SODIUM SERPL-SCNC: 139 MMOL/L (ref 136–145)
WBC # BLD AUTO: 4.9 X10*3/UL (ref 4.4–11.3)

## 2025-02-09 PROCEDURE — 2500000001 HC RX 250 WO HCPCS SELF ADMINISTERED DRUGS (ALT 637 FOR MEDICARE OP)

## 2025-02-09 PROCEDURE — 2500000004 HC RX 250 GENERAL PHARMACY W/ HCPCS (ALT 636 FOR OP/ED)

## 2025-02-09 PROCEDURE — 99232 SBSQ HOSP IP/OBS MODERATE 35: CPT | Performed by: INTERNAL MEDICINE

## 2025-02-09 PROCEDURE — 2500000005 HC RX 250 GENERAL PHARMACY W/O HCPCS: Performed by: FAMILY MEDICINE

## 2025-02-09 PROCEDURE — 36415 COLL VENOUS BLD VENIPUNCTURE: CPT | Performed by: FAMILY MEDICINE

## 2025-02-09 PROCEDURE — 2500000002 HC RX 250 W HCPCS SELF ADMINISTERED DRUGS (ALT 637 FOR MEDICARE OP, ALT 636 FOR OP/ED)

## 2025-02-09 PROCEDURE — 2500000002 HC RX 250 W HCPCS SELF ADMINISTERED DRUGS (ALT 637 FOR MEDICARE OP, ALT 636 FOR OP/ED): Performed by: INTERNAL MEDICINE

## 2025-02-09 PROCEDURE — 82565 ASSAY OF CREATININE: CPT | Performed by: FAMILY MEDICINE

## 2025-02-09 PROCEDURE — 1200000002 HC GENERAL ROOM WITH TELEMETRY DAILY

## 2025-02-09 PROCEDURE — 85027 COMPLETE CBC AUTOMATED: CPT | Performed by: FAMILY MEDICINE

## 2025-02-09 RX ADMIN — POLYETHYLENE GLYCOL 3350 17 G: 17 POWDER, FOR SOLUTION ORAL at 08:19

## 2025-02-09 RX ADMIN — Medication 3 MG: at 21:46

## 2025-02-09 RX ADMIN — PANTOPRAZOLE SODIUM 40 MG: 40 TABLET, DELAYED RELEASE ORAL at 07:37

## 2025-02-09 RX ADMIN — QUETIAPINE FUMARATE 25 MG: 25 TABLET ORAL at 08:19

## 2025-02-09 RX ADMIN — HEPARIN SODIUM 5000 UNITS: 5000 INJECTION, SOLUTION INTRAVENOUS; SUBCUTANEOUS at 21:46

## 2025-02-09 RX ADMIN — LOSARTAN POTASSIUM 25 MG: 25 TABLET, FILM COATED ORAL at 08:19

## 2025-02-09 RX ADMIN — TAMSULOSIN HYDROCHLORIDE 0.4 MG: 0.4 CAPSULE ORAL at 08:19

## 2025-02-09 RX ADMIN — HEPARIN SODIUM 5000 UNITS: 5000 INJECTION, SOLUTION INTRAVENOUS; SUBCUTANEOUS at 07:37

## 2025-02-09 RX ADMIN — HEPARIN SODIUM 5000 UNITS: 5000 INJECTION, SOLUTION INTRAVENOUS; SUBCUTANEOUS at 15:48

## 2025-02-09 RX ADMIN — ASPIRIN 81 MG: 81 TABLET, COATED ORAL at 08:19

## 2025-02-09 RX ADMIN — QUETIAPINE FUMARATE 25 MG: 25 TABLET ORAL at 21:46

## 2025-02-09 ASSESSMENT — COGNITIVE AND FUNCTIONAL STATUS - GENERAL
MOBILITY SCORE: 13
TURNING FROM BACK TO SIDE WHILE IN FLAT BAD: A LITTLE
MOVING TO AND FROM BED TO CHAIR: A LITTLE
TOILETING: A LITTLE
DRESSING REGULAR UPPER BODY CLOTHING: A LOT
STANDING UP FROM CHAIR USING ARMS: A LOT
TURNING FROM BACK TO SIDE WHILE IN FLAT BAD: A LITTLE
MOVING FROM LYING ON BACK TO SITTING ON SIDE OF FLAT BED WITH BEDRAILS: A LITTLE
DRESSING REGULAR LOWER BODY CLOTHING: A LOT
HELP NEEDED FOR BATHING: A LOT
WALKING IN HOSPITAL ROOM: A LOT
MOBILITY SCORE: 15
MOVING FROM LYING ON BACK TO SITTING ON SIDE OF FLAT BED WITH BEDRAILS: A LITTLE
WALKING IN HOSPITAL ROOM: A LOT
HELP NEEDED FOR BATHING: A LOT
MOVING TO AND FROM BED TO CHAIR: A LOT
DAILY ACTIVITIY SCORE: 16
CLIMB 3 TO 5 STEPS WITH RAILING: TOTAL
DRESSING REGULAR UPPER BODY CLOTHING: A LOT
PERSONAL GROOMING: A LITTLE
DAILY ACTIVITIY SCORE: 16
TOILETING: A LITTLE
PERSONAL GROOMING: A LITTLE
STANDING UP FROM CHAIR USING ARMS: A LITTLE
CLIMB 3 TO 5 STEPS WITH RAILING: TOTAL
DRESSING REGULAR LOWER BODY CLOTHING: A LOT

## 2025-02-09 ASSESSMENT — PAIN SCALES - GENERAL
PAINLEVEL_OUTOF10: 0 - NO PAIN
PAINLEVEL_OUTOF10: 0 - NO PAIN

## 2025-02-09 ASSESSMENT — PAIN - FUNCTIONAL ASSESSMENT
PAIN_FUNCTIONAL_ASSESSMENT: 0-10
PAIN_FUNCTIONAL_ASSESSMENT: 0-10

## 2025-02-09 NOTE — PROGRESS NOTES
[facility]  Daughter Donna wants patient to return with [facility]  PT/OT rec MOD  [facility]  Awaiting return call from MIGUEL ÁNGEL MORRELL for approval      02/09/25 0739   Discharge Planning   Assistance Needed Awaiting return call from MIGUEL ÁNGEL MORRELL for approval   Stroke Family Assessment   Stroke Family Assessment Needed No   Intensity of Service   Intensity of Service 0-30 min

## 2025-02-09 NOTE — CARE PLAN
The patient's goals for the shift include      The clinical goals for the shift include patient will remain free of injury    Over the shift, the patient did not make progress toward the following goals.       Problem: Discharge Planning  Goal: Discharge to home or other facility with appropriate resources  2/9/2025 0044 by Keeley Vasquez RN  Outcome: Progressing  2/9/2025 0044 by Keeley Vasquez RN  Outcome: Progressing     Problem: Chronic Conditions and Co-morbidities  Goal: Patient's chronic conditions and co-morbidity symptoms are monitored and maintained or improved  2/9/2025 0044 by Keeley Vasquez RN  Outcome: Progressing  2/9/2025 0044 by Keeley Vasquez RN  Outcome: Progressing     Problem: Skin  Goal: Prevent/manage excess moisture  2/9/2025 0044 by Keeley Vasquez RN  Outcome: Progressing  2/9/2025 0044 by Keeley Vasquez RN  Outcome: Progressing

## 2025-02-09 NOTE — PROGRESS NOTES
Kimberly Randle is a 84 y.o. female     Weekend coverage    Remains confused  Continues to retain urine  Will put in a Sweeney catheter  Continue Flomax      Review of Systems         Vitals:    02/09/25 1142   BP: 121/67   Pulse: 84   Resp: 18   Temp: 36.5 °C (97.7 °F)   SpO2: 97%        Scheduled medications  aspirin, 81 mg, oral, Daily  heparin (porcine), 5,000 Units, subcutaneous, q8h NILAY  losartan, 25 mg, oral, Daily  melatonin, 3 mg, oral, Nightly  pantoprazole, 40 mg, oral, Daily before breakfast  polyethylene glycol, 17 g, oral, Daily  QUEtiapine, 25 mg, oral, BID  tamsulosin, 0.4 mg, oral, Daily      Continuous medications     PRN medications  PRN medications: acetaminophen **OR** acetaminophen **OR** acetaminophen, ondansetron **OR** ondansetron    Lab Review   Results from last 7 days   Lab Units 02/09/25 0438 02/08/25 0459 02/06/25 0427   WBC AUTO x10*3/uL 4.9 5.7 6.6   HEMOGLOBIN g/dL 13.0 13.2 12.8   HEMATOCRIT % 40.8 40.9 40.0   PLATELETS AUTO x10*3/uL 241 254 256     Results from last 7 days   Lab Units 02/09/25 0438 02/08/25 0459 02/06/25 0427 02/05/25  0344 02/04/25  1425   SODIUM mmol/L 139 139 140   < > 133*   POTASSIUM mmol/L 3.8 4.1 4.1   < > 4.0   CHLORIDE mmol/L 108* 109* 106   < > 102   CO2 mmol/L 23 23 24   < > 23   BUN mg/dL 36* 37* 20   < > 20   CREATININE mg/dL 0.76 0.85 0.84   < > 0.95   CALCIUM mg/dL 9.0 9.0 9.6   < > 8.9   PROTEIN TOTAL g/dL  --   --   --   --  6.1*   BILIRUBIN TOTAL mg/dL  --   --   --   --  0.5   ALK PHOS U/L  --   --   --   --  108   ALT U/L  --   --   --   --  19   AST U/L  --   --   --   --  25   GLUCOSE mg/dL 106* 107* 112*   < > 97    < > = values in this interval not displayed.            XR chest 1 view   Final Result   No acute cardiopulmonary disease.        MACRO:   none        Signed by: Nova Otero 2/4/2025 4:33 PM   Dictation workstation:   [id number]      CT head wo IV contrast   Final Result   No CT evidence for acute intracranial pathology.         No significant interval change since the prior study.             Signed by: Richi Salgado 2/4/2025 3:32 PM   Dictation workstation:   [id number]      XR hip right with pelvis when performed 2 or 3 views   Final Result   1. No acute osseous abnormality about the right total hip   arthroplasty.        Signed by: Rodolfo Austin 2/4/2025 3:00 PM   Dictation workstation:   [id number]      XR femur right 2+ views   Final Result   Intact right hip hemiarthroplasty. No acute fracture or dislocation.        MACRO:   None        Signed by: Dylan Bianchi 2/4/2025 3:06 PM   Dictation workstation:   [id number]            Physical Exam    Constitutional   General appearance: Awake  Pulmonary   Respiratory assessment: No respiratory distress, normal respiratory rhythm and effort.    Auscultation of Lungs: Clear bilateral breath sounds.   Cardiovascular   Auscultation of heart: Apical pulse normal, heart rate and rhythm normal, normal S1 and S2, no murmurs and no pericardial rub.    Exam for edema: No peripheral edema.   Abdomen   Abdominal Exam: No bruits, normal bowel sounds, soft, non-tender, no abdominal mass palpated.    Liver and Spleen exam: No hepato-splenomegaly.   Musculoskeletal     Inspection/palpation of joints, bones and muscles: No joint swelling. Normal movement of all extremities.   Neurologic   Cranial nerves: Nerves 2-12 were intact,         Assessment/Plan      #Acute encephalopathy  #Urine retention  Sweeney put in  Repeat urinalysis negative  Continue Flomax    #Fall with status post right hip hemiarthroplasty  Fall risk

## 2025-02-10 LAB
ANION GAP SERPL CALC-SCNC: 11 MMOL/L (ref 10–20)
BUN SERPL-MCNC: 31 MG/DL (ref 6–23)
CALCIUM SERPL-MCNC: 9 MG/DL (ref 8.6–10.3)
CHLORIDE SERPL-SCNC: 107 MMOL/L (ref 98–107)
CO2 SERPL-SCNC: 24 MMOL/L (ref 21–32)
CREAT SERPL-MCNC: 0.8 MG/DL (ref 0.5–1.05)
EGFRCR SERPLBLD CKD-EPI 2021: 72 ML/MIN/1.73M*2
ERYTHROCYTE [DISTWIDTH] IN BLOOD BY AUTOMATED COUNT: 14.7 % (ref 11.5–14.5)
GLUCOSE SERPL-MCNC: 102 MG/DL (ref 74–99)
HCT VFR BLD AUTO: 38.6 % (ref 36–46)
HGB BLD-MCNC: 12.5 G/DL (ref 12–16)
MCH RBC QN AUTO: 29.9 PG (ref 26–34)
MCHC RBC AUTO-ENTMCNC: 32.4 G/DL (ref 32–36)
MCV RBC AUTO: 92 FL (ref 80–100)
NRBC BLD-RTO: 0 /100 WBCS (ref 0–0)
PLATELET # BLD AUTO: 273 X10*3/UL (ref 150–450)
POTASSIUM SERPL-SCNC: 3.7 MMOL/L (ref 3.5–5.3)
RBC # BLD AUTO: 4.18 X10*6/UL (ref 4–5.2)
SODIUM SERPL-SCNC: 138 MMOL/L (ref 136–145)
WBC # BLD AUTO: 8 X10*3/UL (ref 4.4–11.3)

## 2025-02-10 PROCEDURE — 36415 COLL VENOUS BLD VENIPUNCTURE: CPT | Performed by: FAMILY MEDICINE

## 2025-02-10 PROCEDURE — 1200000002 HC GENERAL ROOM WITH TELEMETRY DAILY

## 2025-02-10 PROCEDURE — 80048 BASIC METABOLIC PNL TOTAL CA: CPT | Performed by: FAMILY MEDICINE

## 2025-02-10 PROCEDURE — 85027 COMPLETE CBC AUTOMATED: CPT | Performed by: FAMILY MEDICINE

## 2025-02-10 PROCEDURE — 97535 SELF CARE MNGMENT TRAINING: CPT | Mod: GO

## 2025-02-10 PROCEDURE — 2500000001 HC RX 250 WO HCPCS SELF ADMINISTERED DRUGS (ALT 637 FOR MEDICARE OP)

## 2025-02-10 PROCEDURE — 2500000002 HC RX 250 W HCPCS SELF ADMINISTERED DRUGS (ALT 637 FOR MEDICARE OP, ALT 636 FOR OP/ED): Performed by: INTERNAL MEDICINE

## 2025-02-10 PROCEDURE — 2500000002 HC RX 250 W HCPCS SELF ADMINISTERED DRUGS (ALT 637 FOR MEDICARE OP, ALT 636 FOR OP/ED)

## 2025-02-10 PROCEDURE — 97116 GAIT TRAINING THERAPY: CPT | Mod: GP

## 2025-02-10 PROCEDURE — 2500000004 HC RX 250 GENERAL PHARMACY W/ HCPCS (ALT 636 FOR OP/ED)

## 2025-02-10 PROCEDURE — 2500000005 HC RX 250 GENERAL PHARMACY W/O HCPCS: Performed by: FAMILY MEDICINE

## 2025-02-10 RX ADMIN — ACETAMINOPHEN 650 MG: 325 TABLET, FILM COATED ORAL at 23:39

## 2025-02-10 RX ADMIN — HEPARIN SODIUM 5000 UNITS: 5000 INJECTION, SOLUTION INTRAVENOUS; SUBCUTANEOUS at 13:41

## 2025-02-10 RX ADMIN — ASPIRIN 81 MG: 81 TABLET, COATED ORAL at 09:19

## 2025-02-10 RX ADMIN — QUETIAPINE FUMARATE 25 MG: 25 TABLET ORAL at 20:18

## 2025-02-10 RX ADMIN — POLYETHYLENE GLYCOL 3350 17 G: 17 POWDER, FOR SOLUTION ORAL at 09:19

## 2025-02-10 RX ADMIN — LOSARTAN POTASSIUM 25 MG: 25 TABLET, FILM COATED ORAL at 09:19

## 2025-02-10 RX ADMIN — HEPARIN SODIUM 5000 UNITS: 5000 INJECTION, SOLUTION INTRAVENOUS; SUBCUTANEOUS at 06:24

## 2025-02-10 RX ADMIN — QUETIAPINE FUMARATE 25 MG: 25 TABLET ORAL at 09:19

## 2025-02-10 RX ADMIN — Medication 3 MG: at 20:18

## 2025-02-10 RX ADMIN — TAMSULOSIN HYDROCHLORIDE 0.4 MG: 0.4 CAPSULE ORAL at 09:19

## 2025-02-10 RX ADMIN — PANTOPRAZOLE SODIUM 40 MG: 40 TABLET, DELAYED RELEASE ORAL at 06:24

## 2025-02-10 RX ADMIN — HEPARIN SODIUM 5000 UNITS: 5000 INJECTION, SOLUTION INTRAVENOUS; SUBCUTANEOUS at 21:56

## 2025-02-10 ASSESSMENT — PAIN - FUNCTIONAL ASSESSMENT
PAIN_FUNCTIONAL_ASSESSMENT: 0-10

## 2025-02-10 ASSESSMENT — COGNITIVE AND FUNCTIONAL STATUS - GENERAL
MOBILITY SCORE: 13
CLIMB 3 TO 5 STEPS WITH RAILING: TOTAL
DRESSING REGULAR LOWER BODY CLOTHING: A LOT
WALKING IN HOSPITAL ROOM: A LOT
DAILY ACTIVITIY SCORE: 16
TURNING FROM BACK TO SIDE WHILE IN FLAT BAD: A LOT
DRESSING REGULAR UPPER BODY CLOTHING: A LITTLE
PERSONAL GROOMING: A LITTLE
MOVING TO AND FROM BED TO CHAIR: A LITTLE
MOVING FROM LYING ON BACK TO SITTING ON SIDE OF FLAT BED WITH BEDRAILS: A LOT
STANDING UP FROM CHAIR USING ARMS: A LITTLE
TOILETING: A LOT
HELP NEEDED FOR BATHING: A LOT

## 2025-02-10 ASSESSMENT — PAIN SCALES - GENERAL
PAINLEVEL_OUTOF10: 3
PAINLEVEL_OUTOF10: 1
PAINLEVEL_OUTOF10: 0 - NO PAIN
PAINLEVEL_OUTOF10: 3
PAINLEVEL_OUTOF10: 0 - NO PAIN
PAINLEVEL_OUTOF10: 3

## 2025-02-10 ASSESSMENT — PAIN SCALES - PAIN ASSESSMENT IN ADVANCED DEMENTIA (PAINAD)
BREATHING: NORMAL
TOTALSCORE: MEDICATION (SEE MAR);HEAT APPLIED

## 2025-02-10 ASSESSMENT — PAIN DESCRIPTION - ORIENTATION: ORIENTATION: RIGHT

## 2025-02-10 ASSESSMENT — ACTIVITIES OF DAILY LIVING (ADL): HOME_MANAGEMENT_TIME_ENTRY: 15

## 2025-02-10 ASSESSMENT — PAIN DESCRIPTION - LOCATION: LOCATION: HIP

## 2025-02-10 ASSESSMENT — PAIN SCALES - WONG BAKER: WONGBAKER_NUMERICALRESPONSE: HURTS LITTLE BIT

## 2025-02-10 NOTE — PROGRESS NOTES
Kimberly Randle is a 84 y.o. female       Pt resting in bed  Lunch tray un touched  Appears comfortable       Review of Systems         Vitals:    02/10/25 0747   BP: 132/73   Pulse: 86   Resp: 16   Temp: 35.7 °C (96.3 °F)   SpO2: 96%        Scheduled medications  aspirin, 81 mg, oral, Daily  heparin (porcine), 5,000 Units, subcutaneous, q8h NILAY  losartan, 25 mg, oral, Daily  melatonin, 3 mg, oral, Nightly  pantoprazole, 40 mg, oral, Daily before breakfast  polyethylene glycol, 17 g, oral, Daily  QUEtiapine, 25 mg, oral, BID  tamsulosin, 0.4 mg, oral, Daily      Continuous medications     PRN medications  PRN medications: acetaminophen **OR** acetaminophen **OR** acetaminophen, ondansetron **OR** ondansetron    Lab Review   Results from last 7 days   Lab Units 02/10/25  0450 02/09/25 0438 02/08/25  0459   WBC AUTO x10*3/uL 8.0 4.9 5.7   HEMOGLOBIN g/dL 12.5 13.0 13.2   HEMATOCRIT % 38.6 40.8 40.9   PLATELETS AUTO x10*3/uL 273 241 254     Results from last 7 days   Lab Units 02/10/25  0450 02/09/25 0438 02/08/25 0459 02/05/25  0344 02/04/25  1425   SODIUM mmol/L 138 139 139   < > 133*   POTASSIUM mmol/L 3.7 3.8 4.1   < > 4.0   CHLORIDE mmol/L 107 108* 109*   < > 102   CO2 mmol/L 24 23 23   < > 23   BUN mg/dL 31* 36* 37*   < > 20   CREATININE mg/dL 0.80 0.76 0.85   < > 0.95   CALCIUM mg/dL 9.0 9.0 9.0   < > 8.9   PROTEIN TOTAL g/dL  --   --   --   --  6.1*   BILIRUBIN TOTAL mg/dL  --   --   --   --  0.5   ALK PHOS U/L  --   --   --   --  108   ALT U/L  --   --   --   --  19   AST U/L  --   --   --   --  25   GLUCOSE mg/dL 102* 106* 107*   < > 97    < > = values in this interval not displayed.            XR chest 1 view   Final Result   No acute cardiopulmonary disease.        MACRO:   none        Signed by: Nova Otero 2/4/2025 4:33 PM   Dictation workstation:   [id number]      CT head wo IV contrast   Final Result   No CT evidence for acute intracranial pathology.        No significant interval change  since the prior study.             Signed by: Richi Salgado 2/4/2025 3:32 PM   Dictation workstation:   [id number]      XR hip right with pelvis when performed 2 or 3 views   Final Result   1. No acute osseous abnormality about the right total hip   arthroplasty.        Signed by: Rodolfo Austin 2/4/2025 3:00 PM   Dictation workstation:   [id number]      XR femur right 2+ views   Final Result   Intact right hip hemiarthroplasty. No acute fracture or dislocation.        MACRO:   None        Signed by: Dylan Bianchi 2/4/2025 3:06 PM   Dictation workstation:   [id number]            Physical Exam    Constitutional   General appearance: Asleep  Pulmonary   Respiratory assessment: No respiratory distress, normal respiratory rhythm and effort.    Auscultation of Lungs: Clear bilateral breath sounds.   Cardiovascular   Auscultation of heart: Apical pulse normal, heart rate and rhythm normal, normal S1 and S2, no murmurs and no pericardial rub.    Exam for edema: No peripheral edema.   Abdomen   Abdominal Exam: No bruits, normal bowel sounds, soft, non-tender, no abdominal mass palpated.    Liver and Spleen exam: No hepato-splenomegaly.   Musculoskeletal     Inspection/palpation of joints, bones and muscles: No joint swelling. Normal movement of all extremities.   Neurologic   asleep        Assessment/Plan      #Acute encephalopathy  #Urine retention     Repeat urinalysis negative  Continue Flomax    #Fall with status post right hip hemiarthroplasty  Fall risk      Pt in bed  Nad  Cont w harkins  Psyche has been consulted await input  PTOT    -Continue current treatment as ordered. Will make adjustments as necessary.    Plan of care discussed with: RN    Patient case and plan of care discussed with Dr. SAMANTHA Crisostomo.    BLANQUITA Chávez - CNP  -In collaboration with Dr. SAMANTHA Crisostomo    [facility]  Office: [phone]  Fax: [phone]  I have reviewed the above note obtained and  documented by the NP/PA and I personally participated in the key components. I have discussed the case and management of the patient's care. Changes made to the note, and all key components of history and physical/progress note done by me.  dw nursing  Sandoval Crisostomo MD    .

## 2025-02-10 NOTE — CARE PLAN
Problem: Discharge Planning  Goal: Discharge to home or other facility with appropriate resources  Outcome: Progressing     Problem: Chronic Conditions and Co-morbidities  Goal: Patient's chronic conditions and co-morbidity symptoms are monitored and maintained or improved  Outcome: Progressing     Problem: Nutrition  Goal: Nutrient intake appropriate for maintaining nutritional needs  Outcome: Progressing     Problem: Skin  Goal: Participates in plan/prevention/treatment measures  Outcome: Progressing  Goal: Prevent/manage excess moisture  Outcome: Progressing  Goal: Prevent/minimize sheer/friction injuries  Outcome: Progressing  Goal: Promote/optimize nutrition  Outcome: Progressing  Goal: Promote skin healing  Outcome: Progressing     Problem: Safety - Medical Restraint  Goal: Free from restraint(s) (Restraint for Interference with Medical Device)  Outcome: Progressing   The patient's goals for the shift include  remain free from falls.    The clinical goals for the shift include remain free from falls and injury

## 2025-02-10 NOTE — PROGRESS NOTES
"Physical Therapy    Physical Therapy Treatment    Patient Name: Kimberly Randle \"Sunitha\"  MRN: [id number]  Department: [place] [address]  Room: [address] [place] [address]  Today's Date: 2/10/2025  Time Calculation  Start Time: 1035  Stop Time: 1054  Time Calculation (min): 19 min         Assessment/Plan   PT Assessment  PT Assessment Results: Decreased strength, Decreased endurance, Impaired balance, Decreased mobility, Decreased cognition, Impaired judgement, Decreased safety awareness, Orthopedic restrictions  Rehab Prognosis: Good  Barriers to Discharge Home: Cognition needs, Physical needs  Cognition Needs: 24hr supervision for safety awareness needed, Recollection or understanding of precautions/restrictions limited, Insight of patient limited regarding functional ability/needs, Cognition-related high falls risk  Physical Needs: 24hr mobility assistance needed, High falls risk due to function or environment  Evaluation/Treatment Tolerance: Patient tolerated treatment well  Medical Staff Made Aware: Yes  Strengths: Housing layout, Premorbid level of function  Barriers to Participation: Comorbidities  End of Session Communication: Bedside nurse  Assessment Comment: Pt's activity tolerance increasing with further ambulation and BLE ther ex. Pt continues to require increased assist and cueing to safely complete transfers and gait. Continue to benefit from moderate intensity PT at d/c.  End of Session Patient Position: Bed, 3 rail up, Alarm on  PT Plan  Inpatient/Swing Bed or Outpatient: Inpatient  PT Plan  Treatment/Interventions: Bed mobility, Transfer training, Gait training, Balance training, Neuromuscular re-education, Strengthening, Endurance training, Therapeutic exercise, Therapeutic activity  PT Plan: Ongoing PT  PT Frequency: 3 times per week  PT Discharge Recommendations: Moderate intensity level of continued care  Equipment Recommended upon Discharge: Wheeled walker  PT Recommended Transfer Status: Assist x1, Assistive device  PT - " OK to Discharge: Yes (Per PT POC)      General Visit Information:   PT  Visit  PT Received On: 02/10/25  Response to Previous Treatment: Patient with no complaints from previous session.  General  Reason for Referral: 83 y/o F presenting s/p fall at home and with AMS.  Family/Caregiver Present: No  Prior to Session Communication: Bedside nurse  Patient Position Received: Bed, 3 rail up, Alarm off, not on at start of session  Preferred Learning Style: auditory, kinesthetic  General Comment: Pt is pleasantly confused and fully cooperative, agreeable to PT.    Subjective   Precautions:  Precautions  Hearing/Visual Limitations: [place]  LE Weight Bearing Status: Weight Bearing as Tolerated (RLE)  Medical Precautions: Fall precautions (on RA)  Post-Surgical Precautions: Right hip precautions  Precautions Comment: Pt unable to recall THR precautions and needs cues throughout session to maintain precautions.    Objective   Pain:  Pain Assessment  Pain Assessment: 0-10  0-10 (Numeric) Pain Score: 3  Pain Type: Acute pain  Pain Location: Hip  Pain Orientation: Right  Clinical Progression: Not changed  Response to Interventions: No change in pain  Cognition:  Cognition  Overall Cognitive Status: Impaired  Orientation Level: Disoriented to time, Disoriented to place (Able to orient with choices.)  Insight: Moderate  Impulsive: Moderately    Postural Control:  Static Sitting Balance  Static Sitting-Balance Support: Bilateral upper extremity supported, Feet supported  Static Sitting-Level of Assistance: Close supervision  Dynamic Sitting Balance  Dynamic Sitting-Balance Support: Feet supported, No upper extremity supported  Dynamic Sitting-Level of Assistance: Contact guard  Dynamic Sitting-Balance: Forward lean, Reaching for objects, Trunk control activities  Static Standing Balance  Static Standing-Balance Support: Bilateral upper extremity supported  Static Standing-Level of Assistance: Minimum assistance  Dynamic Standing  Balance  Dynamic Standing-Balance Support: Bilateral upper extremity supported  Dynamic Standing-Level of Assistance: Moderate assistance  Dynamic Standing-Balance: Turning    Activity Tolerance:  Activity Tolerance  Endurance: Tolerates 10 - 20 min exercise with multiple rests  Treatments:  Therapeutic Exercise  Therapeutic Exercise Performed: Yes  Therapeutic Exercise Activity 1: Completed B AP, L HS and hip abd with A, LAQ x 15 reps.    Bed Mobility  Bed Mobility: Yes  Bed Mobility 1  Bed Mobility 1: Supine to sitting  Level of Assistance 1: Maximum assistance, Moderate verbal cues, Moderate tactile cues  Bed Mobility Comments 1: Supine to Sit- Cueing to initially prop up onto elbows then come to long seated position with bilateral elbows fully extended.  Cueing for hand placement posterior to COG to advance hips to the EOB. Required assist with trunk and BLE.  Bed Mobility 2  Bed Mobility  2: Sitting to supine  Level of Assistance 2: Contact guard  Bed Mobility Comments 2: Pt needed some assist with BLE, but able to scoot back into bed without assist.    Ambulation/Gait Training  Ambulation/Gait Training Performed: Yes  Ambulation/Gait Training 1  Surface 1: Level tile  Device 1: Rolling walker  Gait Support Devices: Gait belt  Assistance 1: Minimal verbal cues, Minimal tactile cues (Mainly min A, but 2 episodes of LOB requiring mod A.)  Quality of Gait 1: Narrow base of support, Diminished heel strike, Decreased step length (cues to increase SAGE, LOB x 2 requiring mod A to maintain balance, increasing flexed posture due to fatigue)  Comments/Distance (ft) 1: 60'  Transfers  Transfer: Yes  Transfer 1  Technique 1: Sit to stand, Stand to sit  Transfer Device 1: Walker, Gait belt  Transfer Level of Assistance 1: Minimum assistance, Minimal verbal cues, Minimal tactile cues  Trials/Comments 1: vcs for proper hand placement provided    Outcome Measures:  [facility] Basic Mobility  Turning from your back to your side  while in a flat bed without using bedrails: A lot  Moving from lying on your back to sitting on the side of a flat bed without using bedrails: A lot  Moving to and from bed to chair (including a wheelchair): A little  Standing up from a chair using your arms (e.g. wheelchair or bedside chair): A little  To walk in hospital room: A lot  Climbing 3-5 steps with railing: Total  Basic Mobility - Total Score: 13    Education Documentation  Home Exercise Program, taught by Usha Zepeda, PT at 2/10/2025 11:57 AM.  Learner: Patient  Readiness: Acceptance  Method: Explanation, Demonstration  Response: Needs Reinforcement, Demonstrated Understanding    Precautions, taught by Usha Zepeda, PT at 2/10/2025 11:57 AM.  Learner: Patient  Readiness: Acceptance  Method: Explanation, Demonstration  Response: Needs Reinforcement, Demonstrated Understanding    Body Mechanics, taught by Usha Zepeda, PT at 2/10/2025 11:57 AM.  Learner: Patient  Readiness: Acceptance  Method: Explanation, Demonstration  Response: Needs Reinforcement, Demonstrated Understanding    Mobility Training, taught by Usha Zepeda, PT at 2/10/2025 11:57 AM.  Learner: Patient  Readiness: Acceptance  Method: Explanation, Demonstration  Response: Needs Reinforcement, Demonstrated Understanding    Education Comments  No comments found.        OP EDUCATION:       Encounter Problems       Encounter Problems (Active)       Balance       Goal 1 (Progressing)       Start:  02/05/25    Expected End:  02/19/25       Pt performs all sitting balance with supervision and standing balance with CGA using FWW            Mobility       STG - Patient will ambulate (Met)       Start:  02/05/25    Expected End:  02/19/25    Resolved:  02/10/25    Updated to: STG - Patient will ambulate 100' x 2 with ww with CGA    20 ft with min assist x 1 using FWW         STG - Patient will ambulate 100' x 2 with ww with CGA       Start:  02/10/25    Expected End:  02/19/25       20 ft with  min assist x 1 using FWW            PT Transfers       STG - Patient to transfer to and from sit to supine (Progressing)       Start:  02/05/25    Expected End:  02/19/25       With CGA to/from a flat bed         STG - Patient will transfer sit to and from stand (Progressing)       Start:  02/05/25    Expected End:  02/19/25       Safely, with CGA using FWW and proper body mechanics            Pain - Adult

## 2025-02-10 NOTE — CARE PLAN
The patient's goals for the shift include      The clinical goals for the shift include patient will remain safe    Over the shift, the patient did not make progress toward the following goals.       Problem: Chronic Conditions and Co-morbidities  Goal: Patient's chronic conditions and co-morbidity symptoms are monitored and maintained or improved  Outcome: Progressing     Problem: Discharge Planning  Goal: Discharge to home or other facility with appropriate resources  Outcome: Progressing

## 2025-02-10 NOTE — PROGRESS NOTES
"Occupational Therapy    Occupational Therapy Treatment    Name: Kimberly Randle \"Julia"  MRN: [id number]  Department: [facility] [id number]  Room: [address] [place] [address]  Date: 02/10/25  Time Calculation  Start Time: 1455  Stop Time: 1510  Time Calculation (min): 15 min    Assessment:  OT Assessment:  (Patient seen for follow up treatment, patient with decreased ADLs, decreased transfers, decreased activity tolerance. Moderate intensity therapy to maximize functional independence.)  Prognosis: Good  Barriers to Discharge Home: Cognition needs  Cognition Needs: Recollection or understanding of home exercise program limited  Physical Needs: 24hr mobility assistance needed  Evaluation/Treatment Tolerance: Patient limited by fatigue  Medical Staff Made Aware: Yes  End of Session Communication: Bedside nurse  End of Session Patient Position: Bed, 3 rail up, Alarm on  Plan:  Treatment Interventions: ADL retraining, Functional transfer training, Endurance training, Patient/family training, Neuromuscular reeducation  OT Frequency: 3 times per week  OT Discharge Recommendations: Moderate intensity level of continued care  Equipment Recommended upon Discharge: Wheeled walker  OT Recommended Transfer Status: Minimal assist  OT - OK to Discharge: Yes (Per POC)    Subjective   Previous Visit Info:  OT Last Visit  OT Received On: 02/10/25  General:  General  Reason for Referral: 85 y/o F presenting s/p fall at home and with AMS.  Family/Caregiver Present: No  Prior to Session Communication: Bedside nurse  Patient Position Received: Bed, 3 rail up, Alarm on  General Comment:  (Patient is pleasantly confused. Max verbal cues for safety.)  Precautions:  Hearing/Visual Limitations:  ([employer])  LE Weight Bearing Status: Weight Bearing as Tolerated  Medical Precautions: Fall precautions  Post-Surgical Precautions: Right hip precautions  Precautions Comment:  (Poor recall of hip precautions.)    Pain Assessment:  Pain Assessment  Pain Assessment: 0-10  0-10 (Numeric) " Pain Score: 3  Pain Type: Acute pain  Pain Location: Hip  Pain Orientation: Right     Objective   Cognition:  Overall Cognitive Status: Impaired  Orientation Level: Disoriented to time, Disoriented to place  Cognition Comments:  (Max redirection to task and cues for sequencing.)  Sustained Attention: Impaired (Frequent redirection to task)  Safety/Judgement:  (Decreased insight)  Insight: Moderate  Impulsive: Moderately    Activities of Daily Living:    UE Dressing  UE Dressing Level of Assistance: Minimum assistance  UE Dressing Where Assessed: Edge of bed  UE Dressing Comments:  (Assist with garment around shoulders.)    LE Dressing  LE Dressing: Yes  LE Dressing Adaptive Equipment: Sock aide  Sock Level of Assistance: Moderate assistance  LE Dressing Where Assessed: Edge of bed  LE Dressing Comments:  (Assists with donning socks, assist with pulling on sock aide strings.)    Bed Mobility/Transfers: Bed Mobility  Bed Mobility: Yes  Bed Mobility 1  Bed Mobility 1: Supine to sitting  Level of Assistance 1: Moderate assistance  Bed Mobility Comments 1:  (Assist with upper trunk.)  Bed Mobility 2  Bed Mobility  2: Sitting to supine  Level of Assistance 2: Moderate assistance  Bed Mobility Comments 2:  (Assist with LEs on bed.)    Transfers  Transfer: Yes  Transfer 1  Transfer From 1: Bed to  Transfer to 1: Commode-standard  Technique 1: Sit to stand  Transfer Device 1: Walker  Transfer Level of Assistance 1: Minimum assistance  Trials/Comments 1:  (Cues for hand placement)  Transfers 2  Transfer From 2: Commode-standard to  Transfer to 2: Bed  Technique 2: Sit to stand  Transfer Device 2: Walker  Transfer Level of Assistance 2: Minimum assistance  Trials/Comments 2:  (Cues for hand placement)    Sitting Balance:  Static Sitting Balance  Static Sitting-Balance Support: Feet supported  Static Sitting-Level of Assistance: Contact guard  Dynamic Sitting Balance  Dynamic Sitting-Balance Support: Feet supported  Dynamic  Sitting-Level of Assistance: Minimum assistance  Dynamic Sitting-Balance: Reaching for objects  Standing Balance:  Static Standing Balance  Static Standing-Balance Support: Bilateral upper extremity supported  Static Standing-Level of Assistance: Moderate assistance  Dynamic Standing Balance  Dynamic Standing-Balance Support: Bilateral upper extremity supported  Dynamic Standing-Level of Assistance: Moderate assistance  Dynamic Standing-Balance: Reaching for objects    Outcome Measures:  [facility] Daily Activity  Putting on and taking off regular lower body clothing: A lot  Bathing (including washing, rinsing, drying): A lot  Putting on and taking off regular upper body clothing: A little  Toileting, which includes using toilet, bedpan or urinal: A lot  Taking care of personal grooming such as brushing teeth: A little  Eating Meals: None  Daily Activity - Total Score: 16    Goals:  Encounter Problems       Encounter Problems (Active)       ADLs       Patient will perform UB and LB bathing  with stand by assist level of assistance.  (Progressing)       Start:  02/05/25    Expected End:  02/19/25            Patient with complete upper body dressing with stand by assist level of assistance donning and doffing all UE clothes with no adaptive equipment while edge of bed  (Progressing)       Start:  02/05/25    Expected End:  02/19/25            Patient with complete lower body dressing with minimal assist  level of assistance donning and doffing all LE clothes  with reacher, sock-aid, and dressing stick  while edge of bed  (Progressing)       Start:  02/05/25    Expected End:  02/19/25            Patient will complete daily grooming tasks brushing teeth and washing face/hair with supervision level of assistance and PRN adaptive equipment while standing. (Progressing)       Start:  02/05/25    Expected End:  02/19/25               BALANCE       Patientt will maintain static standing balance during ADL task with supervision  level of assistance drop down in order to demonstrate decreased risk of falling and improved postural control. (Progressing)       Start:  02/05/25    Expected End:  02/19/25               COGNITION/SAFETY       Patient to adhere to hip precautions with minimal verbal cues and demonstrate good safety awareness during ADLs and transfers.  (Progressing)       Start:  02/05/25    Expected End:  02/19/25               TRANSFERS       Patient will perform bed mobility supervision level of assistance and bed rails in order to improve safety and independence with mobility (Progressing)       Start:  02/05/25    Expected End:  02/19/25            Patient will complete functional transfer to all surfaces with front wheeled walker with stand by assist level of assistance. (Progressing)       Start:  02/05/25    Expected End:  02/19/25

## 2025-02-11 LAB
ANION GAP SERPL CALC-SCNC: 10 MMOL/L (ref 10–20)
BUN SERPL-MCNC: 28 MG/DL (ref 6–23)
CALCIUM SERPL-MCNC: 9.1 MG/DL (ref 8.6–10.3)
CHLORIDE SERPL-SCNC: 105 MMOL/L (ref 98–107)
CO2 SERPL-SCNC: 27 MMOL/L (ref 21–32)
CREAT SERPL-MCNC: 0.82 MG/DL (ref 0.5–1.05)
EGFRCR SERPLBLD CKD-EPI 2021: 70 ML/MIN/1.73M*2
ERYTHROCYTE [DISTWIDTH] IN BLOOD BY AUTOMATED COUNT: 14.8 % (ref 11.5–14.5)
GLUCOSE SERPL-MCNC: 107 MG/DL (ref 74–99)
HCT VFR BLD AUTO: 40.9 % (ref 36–46)
HGB BLD-MCNC: 13.2 G/DL (ref 12–16)
MCH RBC QN AUTO: 29.9 PG (ref 26–34)
MCHC RBC AUTO-ENTMCNC: 32.3 G/DL (ref 32–36)
MCV RBC AUTO: 93 FL (ref 80–100)
NRBC BLD-RTO: 0 /100 WBCS (ref 0–0)
PLATELET # BLD AUTO: 219 X10*3/UL (ref 150–450)
POTASSIUM SERPL-SCNC: 4.2 MMOL/L (ref 3.5–5.3)
RBC # BLD AUTO: 4.41 X10*6/UL (ref 4–5.2)
SODIUM SERPL-SCNC: 138 MMOL/L (ref 136–145)
WBC # BLD AUTO: 6.6 X10*3/UL (ref 4.4–11.3)

## 2025-02-11 PROCEDURE — 2500000001 HC RX 250 WO HCPCS SELF ADMINISTERED DRUGS (ALT 637 FOR MEDICARE OP)

## 2025-02-11 PROCEDURE — 2500000002 HC RX 250 W HCPCS SELF ADMINISTERED DRUGS (ALT 637 FOR MEDICARE OP, ALT 636 FOR OP/ED)

## 2025-02-11 PROCEDURE — 2500000004 HC RX 250 GENERAL PHARMACY W/ HCPCS (ALT 636 FOR OP/ED)

## 2025-02-11 PROCEDURE — 2500000005 HC RX 250 GENERAL PHARMACY W/O HCPCS: Performed by: FAMILY MEDICINE

## 2025-02-11 PROCEDURE — 97116 GAIT TRAINING THERAPY: CPT | Mod: GP,CQ

## 2025-02-11 PROCEDURE — 36415 COLL VENOUS BLD VENIPUNCTURE: CPT | Performed by: NURSE PRACTITIONER

## 2025-02-11 PROCEDURE — 2500000002 HC RX 250 W HCPCS SELF ADMINISTERED DRUGS (ALT 637 FOR MEDICARE OP, ALT 636 FOR OP/ED): Performed by: INTERNAL MEDICINE

## 2025-02-11 PROCEDURE — 97110 THERAPEUTIC EXERCISES: CPT | Mod: GP,CQ

## 2025-02-11 PROCEDURE — 85027 COMPLETE CBC AUTOMATED: CPT | Performed by: NURSE PRACTITIONER

## 2025-02-11 PROCEDURE — 80048 BASIC METABOLIC PNL TOTAL CA: CPT | Performed by: NURSE PRACTITIONER

## 2025-02-11 PROCEDURE — 1200000002 HC GENERAL ROOM WITH TELEMETRY DAILY

## 2025-02-11 RX ADMIN — POLYETHYLENE GLYCOL 3350 17 G: 17 POWDER, FOR SOLUTION ORAL at 09:24

## 2025-02-11 RX ADMIN — HEPARIN SODIUM 5000 UNITS: 5000 INJECTION, SOLUTION INTRAVENOUS; SUBCUTANEOUS at 14:57

## 2025-02-11 RX ADMIN — LOSARTAN POTASSIUM 25 MG: 25 TABLET, FILM COATED ORAL at 09:28

## 2025-02-11 RX ADMIN — QUETIAPINE FUMARATE 25 MG: 25 TABLET ORAL at 09:24

## 2025-02-11 RX ADMIN — HEPARIN SODIUM 5000 UNITS: 5000 INJECTION, SOLUTION INTRAVENOUS; SUBCUTANEOUS at 06:02

## 2025-02-11 RX ADMIN — PANTOPRAZOLE SODIUM 40 MG: 40 TABLET, DELAYED RELEASE ORAL at 06:02

## 2025-02-11 RX ADMIN — TAMSULOSIN HYDROCHLORIDE 0.4 MG: 0.4 CAPSULE ORAL at 09:24

## 2025-02-11 RX ADMIN — ASPIRIN 81 MG: 81 TABLET, COATED ORAL at 09:24

## 2025-02-11 RX ADMIN — HEPARIN SODIUM 5000 UNITS: 5000 INJECTION, SOLUTION INTRAVENOUS; SUBCUTANEOUS at 21:17

## 2025-02-11 RX ADMIN — Medication 3 MG: at 21:18

## 2025-02-11 RX ADMIN — QUETIAPINE FUMARATE 25 MG: 25 TABLET ORAL at 21:18

## 2025-02-11 ASSESSMENT — COGNITIVE AND FUNCTIONAL STATUS - GENERAL
MOBILITY SCORE: 13
DRESSING REGULAR LOWER BODY CLOTHING: A LITTLE
DRESSING REGULAR LOWER BODY CLOTHING: A LOT
DRESSING REGULAR LOWER BODY CLOTHING: A LOT
DAILY ACTIVITIY SCORE: 16
WALKING IN HOSPITAL ROOM: A LOT
DAILY ACTIVITIY SCORE: 18
STANDING UP FROM CHAIR USING ARMS: A LOT
DAILY ACTIVITIY SCORE: 16
WALKING IN HOSPITAL ROOM: A LOT
WALKING IN HOSPITAL ROOM: A LITTLE
MOVING TO AND FROM BED TO CHAIR: A LOT
CLIMB 3 TO 5 STEPS WITH RAILING: TOTAL
DRESSING REGULAR LOWER BODY CLOTHING: A LOT
PERSONAL GROOMING: A LITTLE
MOVING TO AND FROM BED TO CHAIR: A LOT
STANDING UP FROM CHAIR USING ARMS: A LITTLE
TURNING FROM BACK TO SIDE WHILE IN FLAT BAD: A LITTLE
TOILETING: A LOT
MOVING TO AND FROM BED TO CHAIR: A LOT
DRESSING REGULAR UPPER BODY CLOTHING: A LITTLE
TOILETING: A LOT
HELP NEEDED FOR BATHING: A LOT
PERSONAL GROOMING: A LITTLE
MOBILITY SCORE: 15
WALKING IN HOSPITAL ROOM: A LOT
DRESSING REGULAR UPPER BODY CLOTHING: A LITTLE
MOBILITY SCORE: 16
DRESSING REGULAR UPPER BODY CLOTHING: A LITTLE
DAILY ACTIVITIY SCORE: 16
HELP NEEDED FOR BATHING: A LOT
CLIMB 3 TO 5 STEPS WITH RAILING: TOTAL
MOBILITY SCORE: 15
HELP NEEDED FOR BATHING: A LITTLE
DRESSING REGULAR UPPER BODY CLOTHING: A LITTLE
STANDING UP FROM CHAIR USING ARMS: A LOT
MOBILITY SCORE: 15
PERSONAL GROOMING: A LITTLE
STANDING UP FROM CHAIR USING ARMS: A LOT
HELP NEEDED FOR BATHING: A LOT
MOVING FROM LYING ON BACK TO SITTING ON SIDE OF FLAT BED WITH BEDRAILS: A LITTLE
STANDING UP FROM CHAIR USING ARMS: A LOT
MOVING TO AND FROM BED TO CHAIR: A LOT
MOVING FROM LYING ON BACK TO SITTING ON SIDE OF FLAT BED WITH BEDRAILS: A LITTLE
CLIMB 3 TO 5 STEPS WITH RAILING: TOTAL
TOILETING: A LOT
MOVING TO AND FROM BED TO CHAIR: A LITTLE
TURNING FROM BACK TO SIDE WHILE IN FLAT BAD: A LITTLE
WALKING IN HOSPITAL ROOM: A LOT
CLIMB 3 TO 5 STEPS WITH RAILING: TOTAL
PERSONAL GROOMING: A LITTLE
CLIMB 3 TO 5 STEPS WITH RAILING: TOTAL
TOILETING: A LOT

## 2025-02-11 ASSESSMENT — PAIN SCALES - GENERAL
PAINLEVEL_OUTOF10: 0 - NO PAIN

## 2025-02-11 ASSESSMENT — PAIN - FUNCTIONAL ASSESSMENT
PAIN_FUNCTIONAL_ASSESSMENT: 0-10
PAIN_FUNCTIONAL_ASSESSMENT: 0-10

## 2025-02-11 ASSESSMENT — PAIN SCALES - WONG BAKER: WONGBAKER_NUMERICALRESPONSE: NO HURT

## 2025-02-11 NOTE — CARE PLAN
The clinical goals for the shift include remain free from fall      Problem: Discharge Planning  Goal: Discharge to home or other facility with appropriate resources  Outcome: Progressing     Problem: Chronic Conditions and Co-morbidities  Goal: Patient's chronic conditions and co-morbidity symptoms are monitored and maintained or improved  Outcome: Progressing     Problem: Nutrition  Goal: Nutrient intake appropriate for maintaining nutritional needs  Outcome: Progressing     Problem: Skin  Goal: Participates in plan/prevention/treatment measures  Outcome: Progressing  Flowsheets (Taken 2/10/2025 2343)  Participates in plan/prevention/treatment measures: Elevate heels  Goal: Prevent/manage excess moisture  Outcome: Progressing  Goal: Prevent/minimize sheer/friction injuries  Outcome: Progressing  Goal: Promote/optimize nutrition  Outcome: Progressing  Flowsheets (Taken 2/10/2025 2343)  Promote/optimize nutrition: Consume > 50% meals/supplements  Goal: Promote skin healing  Outcome: Progressing  Flowsheets (Taken 2/10/2025 2343)  Promote skin healing: Turn/reposition every 2 hours/use positioning/transfer devices     Problem: Safety - Medical Restraint  Goal: Free from restraint(s) (Restraint for Interference with Medical Device)  Outcome: Progressing

## 2025-02-11 NOTE — PROGRESS NOTES
Kimberly Randle is a 84 y.o. female       Pt resting in bed looking a pamphlet  She is awake and alert  Smiles when enter room           Review of Systems         Vitals:    02/11/25 0807   BP: 144/70   Pulse: 92   Resp: 17   Temp: 36.2 °C (97.2 °F)   SpO2: 95%        Scheduled medications  aspirin, 81 mg, oral, Daily  heparin (porcine), 5,000 Units, subcutaneous, q8h NILAY  losartan, 25 mg, oral, Daily  melatonin, 3 mg, oral, Nightly  pantoprazole, 40 mg, oral, Daily before breakfast  polyethylene glycol, 17 g, oral, Daily  QUEtiapine, 25 mg, oral, BID  tamsulosin, 0.4 mg, oral, Daily      Continuous medications     PRN medications  PRN medications: acetaminophen **OR** acetaminophen **OR** acetaminophen, ondansetron **OR** ondansetron    Lab Review   Results from last 7 days   Lab Units 02/11/25  0527 02/10/25  0450 02/09/25  0438   WBC AUTO x10*3/uL 6.6 8.0 4.9   HEMOGLOBIN g/dL 13.2 12.5 13.0   HEMATOCRIT % 40.9 38.6 40.8   PLATELETS AUTO x10*3/uL 219 273 241     Results from last 7 days   Lab Units 02/11/25  0527 02/10/25  0450 02/09/25  0438 02/05/25  0344 02/04/25  1425   SODIUM mmol/L 138 138 139   < > 133*   POTASSIUM mmol/L 4.2 3.7 3.8   < > 4.0   CHLORIDE mmol/L 105 107 108*   < > 102   CO2 mmol/L 27 24 23   < > 23   BUN mg/dL 28* 31* 36*   < > 20   CREATININE mg/dL 0.82 0.80 0.76   < > 0.95   CALCIUM mg/dL 9.1 9.0 9.0   < > 8.9   PROTEIN TOTAL g/dL  --   --   --   --  6.1*   BILIRUBIN TOTAL mg/dL  --   --   --   --  0.5   ALK PHOS U/L  --   --   --   --  108   ALT U/L  --   --   --   --  19   AST U/L  --   --   --   --  25   GLUCOSE mg/dL 107* 102* 106*   < > 97    < > = values in this interval not displayed.            XR chest 1 view   Final Result   No acute cardiopulmonary disease.        MACRO:   none        Signed by: Nova Otero 2/4/2025 4:33 PM   Dictation workstation:   [id number]      CT head wo IV contrast   Final Result   No CT evidence for acute intracranial pathology.        No  significant interval change since the prior study.             Signed by: Richi Salgado 2/4/2025 3:32 PM   Dictation workstation:   [id number]      XR hip right with pelvis when performed 2 or 3 views   Final Result   1. No acute osseous abnormality about the right total hip   arthroplasty.        Signed by: Rodolfo Austin 2/4/2025 3:00 PM   Dictation workstation:   [id number]      XR femur right 2+ views   Final Result   Intact right hip hemiarthroplasty. No acute fracture or dislocation.        MACRO:   None        Signed by: Dylan Bianchi 2/4/2025 3:06 PM   Dictation workstation:   [id number]            Physical Exam    Constitutional   General appearance: awake and alert  Pleasant  Able to answer some questions    Pulmonary   Respiratory assessment: No respiratory distress, normal respiratory rhythm and effort.    Auscultation of Lungs: Clear bilateral breath sounds.   Cardiovascular   Auscultation of heart: Apical pulse normal, heart rate and rhythm normal, normal S1 and S2, no murmurs and no pericardial rub.    Exam for edema: No peripheral edema.   Abdomen   Abdominal Exam: No bruits, normal bowel sounds, soft, non-tender, no abdominal mass palpated.    Liver and Spleen exam: No hepato-splenomegaly.   Musculoskeletal     Inspection/palpation of joints, bones and muscles: No joint swelling. Normal movement of all extremities.   Neurologic   Awake and alert to self     Harkins w yellow urine     Assessment/Plan      #Acute encephalopathy  #Urine retention     Repeat urinalysis negative  Continue Flomax  Will try void trial this am     #Fall with status post right hip hemiarthroplasty  Fall risk      Pt in bed  Nad  Cont w harkins  Psyche has been consulted await input  PTOT      Void trial prior to dc  PTOT    Will need to d/w SW to see if [facility] is able to accept     -Continue current treatment as ordered. Will make adjustments as necessary.    Plan of care discussed with: JENY TODD this am during rounds re  discharge planning       Patient case and plan of care discussed with Dr. SAMANTHA Crisostomo.    BLANQUITA Chávez - CNP  -In collaboration with Dr. SAMANTHA Crisostomo    [facility]  Office: [phone]  Fax: [phone]   I have reviewed the above note obtained and documented by the NP/PA and I personally participated in the key components. I have discussed the case and management of the patient's care. Changes made to the note, and all key components of history and physical/progress note done by me.  dw nursing  Sandoval Crisostomo MD

## 2025-02-11 NOTE — PROGRESS NOTES
Transitional Care Coordination Progress Note:  Plan per Medical/Surgical team: treatment of fall & weakness, urine retention with flomax & harkins cath inserted (?need @ [facility]), psych consult   Status: Inpatient   Payor source: medicare A/B  [facility]: [facility]  Potential Barriers: daughter does not want SNF, AL DON needs to communicate that they will take back  ADOD: 2/11/2025  JAYLEN Gonzalez RN, BSN Transitional Care Coordinator ED# [phone]     @ 0904 Message left for MIGUEL ÁNGEL Dukes [phone] to contact me today regarding DC planning. Awaiting return call.     @ 1010 Another Message left for MIGUEL ÁNGEL Dukes [phone]. Called another # [phone]. No answer as well.     @ 8708 Called [facility] again. Nurse answered in memory care unit, transferred to [place] [facility] [employer]. Notified him that patient was Dcing back to his facility today. He stated he will notify the DON.      @ 6635 Received call from [facility]IGUEL ÁNGEL Dukes & updated on plan of care. Verified patient can return to [facility].      02/11/25 0703   Discharge Planning   Assistance Needed NEED RETURN CALL FROM: PETROS Dukes for [facility] ( [phone]) to determine if can take back   Stroke Family Assessment   Stroke Family Assessment Needed No   Intensity of Service   Intensity of Service 0-30 min

## 2025-02-11 NOTE — PROGRESS NOTES
"Nutrition Consult Note  Nutrition Assessment      Kimberly Randle is a 84 y.o. year old female patient with Acute lower UTI [N39.0]  Acute encephalopathy [G93.40]  Fall, initial encounter [W19.XXXA]  Fall at home, initial encounter [W19.XXXA, Y92.009]  On day #7 of hospital admission    Per chart review:  -Possible discharge today    Visited with pt, pt alert but very confused. Pt sad today, \"I just found out my mother .\" When asked about po intake, \"I eat well but there's so much.\" (Starting point all over the room to all the food; food only on tray table).    Visually saw lunch tray- <25% consumed. About 50% of Ensure consumed.    Scheduled medications  aspirin, 81 mg, oral, Daily  heparin (porcine), 5,000 Units, subcutaneous, q8h NILAY  losartan, 25 mg, oral, Daily  melatonin, 3 mg, oral, Nightly  pantoprazole, 40 mg, oral, Daily before breakfast  polyethylene glycol, 17 g, oral, Daily  QUEtiapine, 25 mg, oral, BID  tamsulosin, 0.4 mg, oral, Daily      Continuous medications     PRN medications  PRN medications: acetaminophen **OR** acetaminophen **OR** acetaminophen, ondansetron **OR** ondansetron    Nutrition Significant Labs:  BMP Trend:   Results from last 7 days   Lab Units 02/11/25  0527 02/10/25  0450 02/09/25  0438 02/08/25  0459   GLUCOSE mg/dL 107* 102* 106* 107*   CALCIUM mg/dL 9.1 9.0 9.0 9.0   SODIUM mmol/L 138 138 139 139   POTASSIUM mmol/L 4.2 3.7 3.8 4.1   CO2 mmol/L 27 24 23 23   CHLORIDE mmol/L 105 107 108* 109*   BUN mg/dL 28* 31* 36* 37*   CREATININE mg/dL 0.82 0.80 0.76 0.85    , BG POCT trend:    , Liver Function Trend:    , Renal Lab Trend:   Results from last 7 days   Lab Units 02/11/25  0527 02/10/25  0450 02/09/25  0438 02/08/25  0459   POTASSIUM mmol/L 4.2 3.7 3.8 4.1   SODIUM mmol/L 138 138 139 139   EGFR mL/min/1.73m*2 70 72 76 67   BUN mg/dL 28* 31* 36* 37*   CREATININE mg/dL 0.82 0.80 0.76 0.85    , Lipid Panel:   Lab Results   Component Value Date    CHOL 156 2022    HDL " "61.4 11/23/2022    CHHDL 2.5 11/23/2022    LDLF 70 11/23/2022    VLDL 25 11/23/2022    TRIG 125 11/23/2022    , Vit D: No results found for: \"VITD25\" , Vit B12:   Lab Results   Component Value Date    [id number] 778 11/06/2023        Dietary Orders (From admission, onward)       Start     Ordered    02/06/25 1249  Oral nutritional supplements  Until discontinued        Question Answer Comment   Deliver with All meals    Select supplement: Ensure Plus High Protein        02/06/25 1248    02/05/25 0534  May Participate in Room Service  ( ROOM SERVICE MAY PARTICIPATE)  Once        Question:  .  Answer:  Yes    02/05/25 0533    02/04/25 1809  Adult diet Regular  Diet effective now        Question:  Diet type  Answer:  Regular    02/04/25 1808                     History:  Energy Intake: Poor < 50 %  Food and Nutrient History: Lunch tray barely touched. 50% of one Ensure consumed    Anthropometrics:  Height: 162.6 cm (5' 4.02\")  Weight: (!) 44.5 kg (98 lb 1.7 oz)  BMI (Calculated): 16.83    Weight Change: -4.71    Wt Readings from Last 12 Encounters:   02/11/25 (!) 44.5 kg (98 lb 1.7 oz)   12/26/24 49.9 kg (110 lb)   12/25/24 49.9 kg (110 lb)   11/06/24 51.3 kg (113 lb)   10/24/24 49.9 kg (110 lb)   09/30/24 50.3 kg (110 lb 12.8 oz)   07/23/24 48.3 kg (106 lb 6.4 oz)   05/13/24 47.6 kg (105 lb)   05/07/24 46.7 kg (103 lb)   04/25/24 47.1 kg (103 lb 12.8 oz)   03/14/24 46.6 kg (102 lb 12.8 oz)   01/23/24 (!) 42.6 kg (94 lb)     Significant Weight Loss: Yes  Interpretation of Weight Loss: >7.5% in 3 months       IBW/kg (Dietitian Calculated): 54.5 kg  Percent of IBW: 82 %       Energy Needs:  Height: 162.6 cm (5' 4.02\")  Temp: 36.3 °C (97.3 °F)    Total Energy Estimated Needs in 24 hours (kCal): 1400 kCal  Energy Estimated Needs per kg Body Weight in 24 hours (kCal/kg): 1650 kCal/kg  Method for Estimating Needs: 30-35kcal/kg    Total Protein Estimated Needs in 24 Hours (g): 55 g  Protein Estimated Needs per kg Body " Weight in 24 Hours (g/kg): 70 g/kg  Method for Estimating 24 Hour Protein Needs: 1.2-1.5g/kg    Method for Estimating 24 Hour Fluid Needs: 1mL/kcal or MD recommendations       Nutrition Focused Physical Findings:  Orbital Fat Pads: Severe (dark circles, hollowing and loose skin)  Buccal Fat Pads: Severe (hollow, sunken and narrow face)    Temporalis: Severe (hollowed scooping depression)  Pectoralis (Clavicular Region): Severe (protruding prominent clavicle)  Deltoid/Trapezius: Severe (squared shoulders, acromion process prominent)    Edema: none       Skin: Negative       Nutrition Diagnosis   Malnutrition Diagnosis  Patient has Malnutrition Diagnosis: Yes  Diagnosis Status: New  Malnutrition Diagnosis: Severe malnutrition related to chronic disease or condition  Related to: chronic illness  As Evidenced by: Greater than 7/5% weight loss in 3 months, severe fat loss and severe muscle loss.  Additional Assessment Information: Suspected prolonged poor oral intake    Nutrition Interventions/Recommendations        Food and/or Nutrient Delivery Interventions  Meals and Snacks: General healthful diet     Medical Food Supplement: Commercial beverage medical food supplement therapy  Goal: Ensure nutritional supplements TID    Feeding Assistance: Meal set up management, Feeding position management, Other (Comment)    Nutrition Monitoring and Evaluation   Food and Nutrient Related History  Estimated Energy Intake: Energy intake greater or equal to 75% of estimated energy needs    Fluid Intake: Estimated fluid intake    Intake / Amount of food: Meets > 75% estimated energy needs, Consumes at least 75% or more of meals/snacks/supplements    Anthropometrics: Body Composition/Growth/Weight History  Body Weight: Body weight - Promote weight restoration    Body Weight Change: Body weight gain - Gradual weight gain    Biochemical Data, Medical Tests and Procedures  Electrolyte and Renal Panel: BUN  Criteria: As clinically  indicated    Gastrointestinal Profile: Other (Comment)  Criteria: As clinically indicated    Glucose/Endocrine Profile: Glucose within normal limits ( mg/dL)  Criteria: As clinically indicated    Nutrition Focused Physical Findings  Adipose Finding: Loss of subcutaneous fat       Digestive System Finding: Nausea, Vomiting, Constipation, Diarrhea    Muscle Finding: Muscle atrophy    Time Spent (min): 30 minutes  Last Date of Nutrition Visit: 02/11/25  Nutrition Follow-Up Needed?: Dietitian to reassess per policy  Follow up Comment: F/uONOFRE

## 2025-02-11 NOTE — CARE PLAN
The patient's goals for the shift include      The clinical goals for the shift include remain free from fall    Over the shift, the patient did not make progress toward the following goals. Barriers to progression include  Recommendations to address these barriers include      Problem: Discharge Planning  Goal: Discharge to home or other facility with appropriate resources  Outcome: Progressing     Problem: Chronic Conditions and Co-morbidities  Goal: Patient's chronic conditions and co-morbidity symptoms are monitored and maintained or improved  Outcome: Progressing     Problem: Nutrition  Goal: Nutrient intake appropriate for maintaining nutritional needs  Outcome: Progressing     Problem: Skin  Goal: Participates in plan/prevention/treatment measures  Outcome: Progressing     Problem: Skin  Goal: Promote skin healing  Outcome: Progressing

## 2025-02-11 NOTE — PROGRESS NOTES
"Physical Therapy    Physical Therapy Treatment    Patient Name: Kimberly Randle \"Sunitha\"  MRN: [id number]  Department: [place] [address]  Room: [address] [place] [address]  Today's Date: 2/11/2025  Time Calculation  Start Time: 1355  Stop Time: 1420  Time Calculation (min): 25 min         Assessment/Plan   PT Assessment  Rehab Prognosis: Good  Barriers to Discharge Home: Cognition needs, Physical needs  Cognition Needs: 24hr supervision for safety awareness needed, Recollection or understanding of precautions/restrictions limited, Insight of patient limited regarding functional ability/needs, Cognition-related high falls risk  Physical Needs: 24hr mobility assistance needed, High falls risk due to function or environment  End of Session Communication: Bedside nurse  Assessment Comment: Pt demonstrates unsteadiness and 1 LOB noted during standing dynamic exercises. Pt requires max VC for safety  End of Session Patient Position: Bed, 3 rail up, Alarm on  PT Plan  Inpatient/Swing Bed or Outpatient: Inpatient  PT Plan  Treatment/Interventions: Bed mobility, Transfer training, Gait training  PT Plan: Ongoing PT  PT Frequency: 3 times per week  PT Discharge Recommendations: Moderate intensity level of continued care  Equipment Recommended upon Discharge: Wheeled walker  PT Recommended Transfer Status: Assist x1  PT - OK to Discharge: Yes (per POC)      General Visit Information:   PT  Visit  PT Received On: 02/11/25  General  Reason for Referral: 85 y/o F presenting s/p fall at home and with AMS.  Referred By: JANEE King (APRN-CNP)  Past Medical History Relevant to Rehab: HTN, HLD, anxiety, closed fx of neck of R femur s/p R hemiarthroplast 12/27/24, dementia  Prior to Session Communication: Bedside nurse  Patient Position Received: Bed, 3 rail up, Alarm on  General Comment: Pt pleasantly confused    Subjective   Precautions:  Precautions  LE Weight Bearing Status: Weight Bearing as Tolerated (RLE)  Medical Precautions: Fall " precautions  Post-Surgical Precautions: Right hip precautions  Precautions Comment: Poor recall of hip precautions (PMH of R hemiarthroplasty of R hip 12/27/24)            Objective   Pain:  Pain Assessment  Pain Assessment: 0-10  0-10 (Numeric) Pain Score: 0 - No pain (pt reports pain with movement)  Cognition:  Cognition  Overall Cognitive Status: Impaired  Orientation Level: Disoriented to place, Disoriented to time, Disoriented to situation       Postural Control:  Static Sitting Balance  Static Sitting-Balance Support: Bilateral upper extremity supported, Feet supported  Static Sitting-Level of Assistance: Close supervision  Static Standing Balance  Static Standing-Balance Support: Bilateral upper extremity supported  Static Standing-Level of Assistance: Contact guard  Dynamic Standing Balance  Dynamic Standing-Balance Support: No upper extremity supported  Dynamic Standing-Level of Assistance: Moderate assistance  Dynamic Standing-Balance: Lateral lean, Forward lean, Reaching for objects, Reaching across midline  Dynamic Standing-Comments: 1 LOB noted requiring modA for fall prevention       Treatments:  Therapeutic Exercise  Therapeutic Exercise Performed: Yes  Therapeutic Exercise Activity 1: Pt performed x 15 reps seated LAQ and AP. Pt performed supine x 15 reps hip ABD and HS. Pt performs within precautions with VC. No physical assist required and good ROM noted.         Bed Mobility  Bed Mobility: Yes  Bed Mobility 1  Bed Mobility 1: Supine to sitting, Sitting to supine  Level of Assistance 1: Close supervision  Bed Mobility Comments 1: HOB elevated. Pt requires VC to remain within precautions. No use of bed rail to perform    Ambulation/Gait Training  Ambulation/Gait Training Performed: Yes  Ambulation/Gait Training 1  Surface 1: Level tile  Device 1: Rolling walker  Gait Support Devices: Gait belt  Assistance 1: Minimum assistance  Quality of Gait 1: Narrow base of support, Diminished heel strike,  Decreased step length  Comments/Distance (ft) 1: 60 x 2, 12 (Pt demonstrates poor safety awareness and requires WW management and max VC for increased step length, posture, and WW management. Unsteadiness noted.)  Transfers  Transfer: Yes  Transfer 1  Transfer From 1: Bed to  Transfer to 1: Stand  Technique 1: Sit to stand, Stand to sit  Transfer Device 1: Walker, Gait belt  Transfer Level of Assistance 1: Contact guard  Trials/Comments 1: Max VC for correct hand placemement  Transfers 2  Transfer From 2: Commode-standard to  Transfer to 2: Stand  Technique 2: Sit to stand, Stand to sit  Transfer Device 2: Walker, Gait belt  Transfer Level of Assistance 2: Contact guard  Trials/Comments 2: Max VC for correct hand placement    Outcome Measures:  [facility] Basic Mobility  Turning from your back to your side while in a flat bed without using bedrails: A little  Moving from lying on your back to sitting on the side of a flat bed without using bedrails: A little  Moving to and from bed to chair (including a wheelchair): A little  Standing up from a chair using your arms (e.g. wheelchair or bedside chair): A little  To walk in hospital room: A little  Climbing 3-5 steps with railing: Total  Basic Mobility - Total Score: 16    Education Documentation  Home Exercise Program, taught by Shantel Salas PTA at 2/11/2025  3:43 PM.  Learner: Patient  Readiness: Acceptance  Method: Explanation  Response: Needs Reinforcement    Precautions, taught by Shantel Salas PTA at 2/11/2025  3:43 PM.  Learner: Patient  Readiness: Acceptance  Method: Explanation  Response: Needs Reinforcement    Body Mechanics, taught by Shantel Salas PTA at 2/11/2025  3:43 PM.  Learner: Patient  Readiness: Acceptance  Method: Explanation  Response: Needs Reinforcement    Mobility Training, taught by Shantel Salas PTA at 2/11/2025  3:43 PM.  Learner: Patient  Readiness: Acceptance  Method: Explanation  Response: Needs  Reinforcement    Education Comments  No comments found.           Encounter Problems       Encounter Problems (Active)       Balance       Goal 1 (Not Progressing)       Start:  02/05/25    Expected End:  02/19/25       Pt performs all sitting balance with supervision and standing balance with CGA using FWW            Mobility       STG - Patient will ambulate (Met)       Start:  02/05/25    Expected End:  02/19/25    Resolved:  02/10/25    Updated to: STG - Patient will ambulate 100' x 2 with ww with CGA    20 ft with min assist x 1 using FWW         STG - Patient will ambulate 100' x 2 with ww with CGA (Progressing)       Start:  02/10/25    Expected End:  02/19/25       20 ft with min assist x 1 using FWW            PT Transfers       STG - Patient to transfer to and from sit to supine (Progressing)       Start:  02/05/25    Expected End:  02/19/25       With CGA to/from a flat bed         STG - Patient will transfer sit to and from stand (Progressing)       Start:  02/05/25    Expected End:  02/19/25       Safely, with CGA using FWW and proper body mechanics            Pain - Adult

## 2025-02-12 ENCOUNTER — TELEPHONE (OUTPATIENT)
Dept: OBSTETRICS AND GYNECOLOGY | Facility: CLINIC | Age: 85
End: 2025-02-12
Payer: MEDICARE

## 2025-02-12 VITALS
RESPIRATION RATE: 17 BRPM | DIASTOLIC BLOOD PRESSURE: 66 MMHG | OXYGEN SATURATION: 94 % | TEMPERATURE: 97.5 F | HEIGHT: 64 IN | WEIGHT: 98.11 LBS | SYSTOLIC BLOOD PRESSURE: 140 MMHG | HEART RATE: 72 BPM | BODY MASS INDEX: 16.75 KG/M2

## 2025-02-12 PROCEDURE — 1200000002 HC GENERAL ROOM WITH TELEMETRY DAILY

## 2025-02-12 PROCEDURE — 2500000002 HC RX 250 W HCPCS SELF ADMINISTERED DRUGS (ALT 637 FOR MEDICARE OP, ALT 636 FOR OP/ED)

## 2025-02-12 PROCEDURE — 2500000001 HC RX 250 WO HCPCS SELF ADMINISTERED DRUGS (ALT 637 FOR MEDICARE OP)

## 2025-02-12 PROCEDURE — 2500000005 HC RX 250 GENERAL PHARMACY W/O HCPCS: Performed by: FAMILY MEDICINE

## 2025-02-12 PROCEDURE — 2500000002 HC RX 250 W HCPCS SELF ADMINISTERED DRUGS (ALT 637 FOR MEDICARE OP, ALT 636 FOR OP/ED): Performed by: INTERNAL MEDICINE

## 2025-02-12 PROCEDURE — 2500000004 HC RX 250 GENERAL PHARMACY W/ HCPCS (ALT 636 FOR OP/ED)

## 2025-02-12 RX ORDER — QUETIAPINE FUMARATE 25 MG/1
25 TABLET, FILM COATED ORAL 2 TIMES DAILY
Qty: 60 TABLET | Refills: 0 | Status: SHIPPED | OUTPATIENT
Start: 2025-02-12 | End: 2025-03-07 | Stop reason: WASHOUT

## 2025-02-12 RX ORDER — TAMSULOSIN HYDROCHLORIDE 0.4 MG/1
0.4 CAPSULE ORAL DAILY
Qty: 30 CAPSULE | Refills: 0 | Status: SHIPPED | OUTPATIENT
Start: 2025-02-12 | End: 2025-03-14

## 2025-02-12 RX ORDER — POLYETHYLENE GLYCOL 3350 17 G/17G
17 POWDER, FOR SOLUTION ORAL DAILY PRN
Start: 2025-02-12 | End: 2025-05-07 | Stop reason: WASHOUT

## 2025-02-12 RX ORDER — ACETAMINOPHEN 325 MG/1
650 TABLET ORAL EVERY 4 HOURS PRN
Start: 2025-02-12

## 2025-02-12 RX ORDER — TALC
3 POWDER (GRAM) TOPICAL NIGHTLY
Start: 2025-02-12 | End: 2025-02-24 | Stop reason: WASHOUT

## 2025-02-12 RX ADMIN — HEPARIN SODIUM 5000 UNITS: 5000 INJECTION, SOLUTION INTRAVENOUS; SUBCUTANEOUS at 20:47

## 2025-02-12 RX ADMIN — ASPIRIN 81 MG: 81 TABLET, COATED ORAL at 09:14

## 2025-02-12 RX ADMIN — PANTOPRAZOLE SODIUM 40 MG: 40 TABLET, DELAYED RELEASE ORAL at 06:58

## 2025-02-12 RX ADMIN — LOSARTAN POTASSIUM 25 MG: 25 TABLET, FILM COATED ORAL at 09:14

## 2025-02-12 RX ADMIN — HEPARIN SODIUM 5000 UNITS: 5000 INJECTION, SOLUTION INTRAVENOUS; SUBCUTANEOUS at 06:58

## 2025-02-12 RX ADMIN — Medication 3 MG: at 20:47

## 2025-02-12 RX ADMIN — QUETIAPINE FUMARATE 25 MG: 25 TABLET ORAL at 20:47

## 2025-02-12 RX ADMIN — QUETIAPINE FUMARATE 25 MG: 25 TABLET ORAL at 09:15

## 2025-02-12 RX ADMIN — TAMSULOSIN HYDROCHLORIDE 0.4 MG: 0.4 CAPSULE ORAL at 09:14

## 2025-02-12 ASSESSMENT — COGNITIVE AND FUNCTIONAL STATUS - GENERAL
DAILY ACTIVITIY SCORE: 16
MOVING TO AND FROM BED TO CHAIR: A LOT
HELP NEEDED FOR BATHING: A LOT
DRESSING REGULAR LOWER BODY CLOTHING: A LOT
CLIMB 3 TO 5 STEPS WITH RAILING: TOTAL
WALKING IN HOSPITAL ROOM: A LOT
MOVING TO AND FROM BED TO CHAIR: A LOT
TOILETING: A LOT
PERSONAL GROOMING: A LITTLE
STANDING UP FROM CHAIR USING ARMS: A LOT
DRESSING REGULAR LOWER BODY CLOTHING: A LOT
HELP NEEDED FOR BATHING: A LOT
DRESSING REGULAR UPPER BODY CLOTHING: A LITTLE
DRESSING REGULAR UPPER BODY CLOTHING: A LITTLE
WALKING IN HOSPITAL ROOM: A LOT
MOBILITY SCORE: 15
PERSONAL GROOMING: A LITTLE
MOBILITY SCORE: 15
TOILETING: A LOT
STANDING UP FROM CHAIR USING ARMS: A LOT
CLIMB 3 TO 5 STEPS WITH RAILING: TOTAL
DAILY ACTIVITIY SCORE: 16

## 2025-02-12 ASSESSMENT — PAIN - FUNCTIONAL ASSESSMENT
PAIN_FUNCTIONAL_ASSESSMENT: 0-10
PAIN_FUNCTIONAL_ASSESSMENT: 0-10

## 2025-02-12 ASSESSMENT — PAIN SCALES - GENERAL
PAINLEVEL_OUTOF10: 0 - NO PAIN
PAINLEVEL_OUTOF10: 0 - NO PAIN

## 2025-02-12 NOTE — CARE PLAN
The patient's goals for the shift include No falls by end of shift    The clinical goals for the shift include safety

## 2025-02-12 NOTE — PROGRESS NOTES
***Pre rounding note - This note prepared and pended in anticipation of rounding. The content, physical exam, assessment and plan are not finalized till note is signed as complete ***        Kimberly Randle is a 84 y.o. female     ***      Review of Systems         Vitals:    02/12/25 0739   BP: (!) 117/47   Pulse: 79   Resp: 18   Temp: 37.1 °C (98.8 °F)   SpO2: 98%        Scheduled medications  aspirin, 81 mg, oral, Daily  heparin (porcine), 5,000 Units, subcutaneous, q8h NILAY  losartan, 25 mg, oral, Daily  melatonin, 3 mg, oral, Nightly  pantoprazole, 40 mg, oral, Daily before breakfast  polyethylene glycol, 17 g, oral, Daily  QUEtiapine, 25 mg, oral, BID  tamsulosin, 0.4 mg, oral, Daily      Continuous medications     PRN medications  PRN medications: acetaminophen **OR** acetaminophen **OR** acetaminophen, ondansetron **OR** ondansetron    Lab Review   Results from last 7 days   Lab Units 02/11/25  0527 02/10/25  0450 02/09/25  0438   WBC AUTO x10*3/uL 6.6 8.0 4.9   HEMOGLOBIN g/dL 13.2 12.5 13.0   HEMATOCRIT % 40.9 38.6 40.8   PLATELETS AUTO x10*3/uL 219 273 241     Results from last 7 days   Lab Units 02/11/25  0527 02/10/25  0450 02/09/25  0438   SODIUM mmol/L 138 138 139   POTASSIUM mmol/L 4.2 3.7 3.8   CHLORIDE mmol/L 105 107 108*   CO2 mmol/L 27 24 23   BUN mg/dL 28* 31* 36*   CREATININE mg/dL 0.82 0.80 0.76   CALCIUM mg/dL 9.1 9.0 9.0   GLUCOSE mg/dL 107* 102* 106*            XR chest 1 view   Final Result   No acute cardiopulmonary disease.        MACRO:   none        Signed by: Nova Otero 2/4/2025 4:33 PM   Dictation workstation:   [id number]      CT head wo IV contrast   Final Result   No CT evidence for acute intracranial pathology.        No significant interval change since the prior study.             Signed by: Richi Salgado 2/4/2025 3:32 PM   Dictation workstation:   [id number]      XR hip right with pelvis when performed 2 or 3 views   Final Result   1. No acute osseous abnormality  about the right total hip   arthroplasty.        Signed by: Rodolfo Austin 2/4/2025 3:00 PM   Dictation workstation:   [id number]      XR femur right 2+ views   Final Result   Intact right hip hemiarthroplasty. No acute fracture or dislocation.        MACRO:   None        Signed by: Dylan Bianchi 2/4/2025 3:06 PM   Dictation workstation:   [id number]            Physical Exam    Constitutional   General appearance: awake and alert  Pleasant  Able to answer some questions    Pulmonary   Respiratory assessment: No respiratory distress, normal respiratory rhythm and effort.    Auscultation of Lungs: Clear bilateral breath sounds.   Cardiovascular   Auscultation of heart: Apical pulse normal, heart rate and rhythm normal, normal S1 and S2, no murmurs and no pericardial rub.    Exam for edema: No peripheral edema.   Abdomen   Abdominal Exam: No bruits, normal bowel sounds, soft, non-tender, no abdominal mass palpated.    Liver and Spleen exam: No hepato-splenomegaly.   Musculoskeletal     Inspection/palpation of joints, bones and muscles: No joint swelling. Normal movement of all extremities.   Neurologic   Awake and alert to self     Harkins w yellow urine     Assessment/Plan      #Acute encephalopathy  #Urine retention     Repeat urinalysis negative  Continue Flomax  Will try void trial this am     #Fall with status post right hip hemiarthroplasty  Fall risk      Pt in bed  Nad  Cont w harkins  Psyche has been consulted await input  PTOT      Void trial prior to dc  PTOT    Will need to d/w SW to see if [facility] is able to accept     -Continue current treatment as ordered. Will make adjustments as necessary.    Plan of care discussed with: JENY TODD this am during rounds re discharge planning      ##Transcribed for Dr. SAMANTHA Crisostomo##    ***Pre rounding note - This note prepared and pended in anticipation of rounding. The content, physical exam, assessment and plan are not finalized till note is signed as complete ***

## 2025-02-12 NOTE — DISCHARGE SUMMARY
***Pre rounding note - This note prepared and pended in anticipation of rounding. The content, physical exam, assessment and plan are not finalized till note is signed as complete ***    Inpatient Discharge Summary    BRIEF OVERVIEW  Admitting Provider: Catalina Huerta MD  Discharge Provider: Sandoval Crisostomo MD  Primary Care Physician at Discharge: Feliz Brito [phone] ***    Treatment Team:   Attending Provider: Sandoval Crisostomo MD  Consulting Physician: Naomy Sanchez MD  Occupational Therapist: Db Ren OT  Physical Therapy Assistant: Shantel Salas PTA  Transitional Care Coordinator: Nurys Baker RN  Licensed Practical Nurse: Bree Slade LPN      Admission Date: 2/4/2025     Discharge Date: 2/12/2025    Primary Discharge Diagnosis  Assessment & Plan  Fall at home, initial encounter    Fall, initial encounter        Discharge Disposition  Inpatient Rehab Facility (IRF)  Code Status at Discharge: ***    Active Issues Requiring Follow-up  {Active Issues Requiring Follow-up:44817}    Outpatient Follow-Up  Future Appointments   Date Time Provider Department Center   2/21/2025  3:00 PM Rosalba Hill MD [id number] [place]   3/10/2025 10:45 AM Daniel Love MD [id number] [place]   4/1/2025  2:30 PM Abby Urbina PA-C [id number] [facility]   5/7/2025  1:45 PM Mamadou Barrett MD [id number] [place]       [unfilled]    Test Results Pending at Discharge  Pending Labs       No current pending labs.                  DETAILS OF HOSPITAL STAY    Presenting Problem/History of Present Illness  Acute lower UTI [N39.0]  Acute encephalopathy [G93.40]  Fall, initial encounter [W19.XXXA]  Fall at home, initial encounter [W19.XXXA, Y92.009]  ***    Hospital Course     ***         Your medication list        START taking these medications        Instructions Last Dose Given Next Dose Due   QUEtiapine 25 mg tablet  Commonly known as: SEROquel      Take 1 tablet (25 mg) by mouth 2 times a day.        tamsulosin 0.4 mg 24 hr capsule  Commonly known as: Flomax      Take 1 capsule (0.4 mg) by mouth once daily.              CHANGE how you take these medications        Instructions Last Dose Given Next Dose Due   acetaminophen 325 mg tablet  Commonly known as: Tylenol  What changed:   medication strength  how much to take  when to take this  reasons to take this      Take 2 tablets (650 mg) by mouth every 4 hours if needed for mild pain (1 - 3).              CONTINUE taking these medications        Instructions Last Dose Given Next Dose Due   aspirin 81 mg EC tablet      Take 1 tablet (81 mg) by mouth 2 times a day.       losartan 25 mg tablet  Commonly known as: Cozaar      ONE tablet by mouth once daily FOR ESSENTIAL HYPERTENSION       pantoprazole 40 mg EC tablet  Commonly known as: ProtoNix                  STOP taking these medications      cyclobenzaprine 10 mg tablet  Commonly known as: Flexeril        docusate sodium 100 mg capsule  Commonly known as: Colace        estradiol 0.01 % (0.1 mg/gram) vaginal cream  Commonly known as: Estrace        multivitamin with minerals tablet        nitrofurantoin (macrocrystal-monohydrate) 100 mg capsule  Commonly known as: Macrobid        ondansetron 8 mg tablet  Commonly known as: Zofran        oxyCODONE 5 mg immediate release tablet  Commonly known as: Roxicodone        rosuvastatin 40 mg tablet  Commonly known as: Crestor        sulfamethoxazole-trimethoprim 800-160 mg tablet  Commonly known as: Bactrim DS        traMADol 50 mg tablet  Commonly known as: Ultram        valACYclovir 1 gram tablet  Commonly known as: Valtrex                  Where to Get Your Medications        These medications were sent to [facility] [place] [address] [address] [place] [address] [address] [facility] [address]      Phone: [phone]   QUEtiapine 25 mg tablet  tamsulosin 0.4 mg 24 hr capsule       Information about where to get these medications is not yet available    Ask your  nurse or doctor about these medications  acetaminophen 325 mg tablet           Operative Procedures Performed    Treatments: {Tx:65299}  Consults: {consults:70792}  Procedures: {procedures:59487}  Pertinent Test Results: {diagnostics:41322}    Physical Exam at Discharge  Discharge Condition: {condition:72021}  Heart Rate: 79  Resp: 18  BP: (!) 117/47  Temp: 37.1 °C (98.8 °F)  Weight: (!) 44.5 kg (98 lb 1.7 oz)  {Exam, Complete:43185}

## 2025-02-12 NOTE — PROGRESS NOTES
Kimberly Randle is a 84 y.o. female       Pt resting in bed   She is awake and alert  Smiles when enter room           Review of Systems         Vitals:    02/12/25 1220   BP: 147/64   Pulse: 99   Resp: 17   Temp: 35.9 °C (96.6 °F)   SpO2: 96%        Scheduled medications  aspirin, 81 mg, oral, Daily  heparin (porcine), 5,000 Units, subcutaneous, q8h NILAY  losartan, 25 mg, oral, Daily  melatonin, 3 mg, oral, Nightly  pantoprazole, 40 mg, oral, Daily before breakfast  polyethylene glycol, 17 g, oral, Daily  QUEtiapine, 25 mg, oral, BID  tamsulosin, 0.4 mg, oral, Daily      Continuous medications     PRN medications  PRN medications: acetaminophen **OR** acetaminophen **OR** acetaminophen, ondansetron **OR** ondansetron    Lab Review   Results from last 7 days   Lab Units 02/11/25  0527 02/10/25  0450 02/09/25  0438   WBC AUTO x10*3/uL 6.6 8.0 4.9   HEMOGLOBIN g/dL 13.2 12.5 13.0   HEMATOCRIT % 40.9 38.6 40.8   PLATELETS AUTO x10*3/uL 219 273 241     Results from last 7 days   Lab Units 02/11/25  0527 02/10/25  0450 02/09/25  0438   SODIUM mmol/L 138 138 139   POTASSIUM mmol/L 4.2 3.7 3.8   CHLORIDE mmol/L 105 107 108*   CO2 mmol/L 27 24 23   BUN mg/dL 28* 31* 36*   CREATININE mg/dL 0.82 0.80 0.76   CALCIUM mg/dL 9.1 9.0 9.0   GLUCOSE mg/dL 107* 102* 106*            XR chest 1 view   Final Result   No acute cardiopulmonary disease.        MACRO:   none        Signed by: Nova Otero 2/4/2025 4:33 PM   Dictation workstation:   [id number]      CT head wo IV contrast   Final Result   No CT evidence for acute intracranial pathology.        No significant interval change since the prior study.             Signed by: Richi Salgado 2/4/2025 3:32 PM   Dictation workstation:   [id number]      XR hip right with pelvis when performed 2 or 3 views   Final Result   1. No acute osseous abnormality about the right total hip   arthroplasty.        Signed by: Rodolfo Austin 2/4/2025 3:00 PM   Dictation workstation:    [id number]      XR femur right 2+ views   Final Result   Intact right hip hemiarthroplasty. No acute fracture or dislocation.        MACRO:   None        Signed by: Dylan Bianchi 2/4/2025 3:06 PM   Dictation workstation:   [id number]            Physical Exam    Constitutional   General appearance: awake and alert  Pleasant  Able to answer some questions    Pulmonary   Respiratory assessment: No respiratory distress, normal respiratory rhythm and effort.    Auscultation of Lungs: Clear bilateral breath sounds.   Cardiovascular   Auscultation of heart: Apical pulse normal, heart rate and rhythm normal, normal S1 and S2, no murmurs and no pericardial rub.    Exam for edema: No peripheral edema.   Abdomen   Abdominal Exam: No bruits, normal bowel sounds, soft, non-tender, no abdominal mass palpated.    Liver and Spleen exam: No hepato-splenomegaly.   Musculoskeletal     Inspection/palpation of joints, bones and muscles: No joint swelling. Normal movement of all extremities.   Neurologic   Awake and alert to self     Sweeney w yellow urine     Assessment/Plan      #Acute encephalopathy  #Urine retention     Repeat urinalysis negative  Continue Flomax  Sweeney has been removed and she is passing uine    #Fall with status post right hip hemiarthroplasty  Fall risk      Pt in bed  Nad  Psyche has been consulted pt is on seroquel   PTOT       swll need to d/w SW to see if [facility] is able to accept     -Continue current treatment as ordered. Will make adjustments as necessary.    Plan of care discussed with: RN  Kirill TODD this am during rounds re discharge planning       nursing  Sandoval Crisostomo MD  Called and left message for Donna this AM

## 2025-02-12 NOTE — NURSING NOTE
PC from Daughter,  Donna Cunningham  would like to speak to transitional care coordinator. Daughters phone number is [phone] concerned about discharge and would like a return phone call to discuss further and wants to make sure patient goes to the right level of care upon discharge. TCC name given with phone number and note put in chart for contact.

## 2025-02-12 NOTE — PROGRESS NOTES
02/12/25 1351   Discharge Planning   Home or Post Acute Services Post acute facilities (Rehab/SNF/etc)   Type of Post Acute Facility Services Rehab   Expected Discharge Disposition Rehab     Patient is medically ready for discharge    SNF-                             Facility   [facility]             Nurse/ family notified Isi Giron Daphne, Emma/daughter--Yoko will call             Report number             Transport time

## 2025-02-12 NOTE — PROGRESS NOTES
02/12/25 1402   Discharge Planning   Expected Discharge Disposition Rehab     Spoke with daughter via phone. Discussed dc plan for patient to return to [facility]. Daughter expressed concern over patient returning to [facility] when she is weak. Daughter stated that she does not want patient to go to SNF, but she had been to [facility] previously and did well there. Referral sent to [facility]. They can accept, no auth needed.    Addendum: Daughter aware of dc to [facility] today awaiting on confirmed transport time. SW will follow.    Addendum: Transport scheduled for 7pm. Daughter, facility and care team aware.

## 2025-02-12 NOTE — CARE PLAN
The patient's goals for the shift include No falls by end of shift    The clinical goals for the shift include safety    Problem: Discharge Planning  Goal: Discharge to home or other facility with appropriate resources  Outcome: Progressing     Problem: Chronic Conditions and Co-morbidities  Goal: Patient's chronic conditions and co-morbidity symptoms are monitored and maintained or improved  Outcome: Progressing     Problem: Nutrition  Goal: Nutrient intake appropriate for maintaining nutritional needs  Outcome: Progressing

## 2025-02-13 ENCOUNTER — LAB REQUISITION (OUTPATIENT)
Dept: LAB | Facility: HOSPITAL | Age: 85
End: 2025-02-13
Payer: MEDICARE

## 2025-02-13 PROBLEM — G93.40 ACUTE ENCEPHALOPATHY: Status: ACTIVE | Noted: 2025-02-13

## 2025-02-13 PROBLEM — Z96.641 HISTORY OF RIGHT HIP HEMIARTHROPLASTY: Status: ACTIVE | Noted: 2025-02-13

## 2025-02-13 PROBLEM — R53.81 DEBILITY: Status: ACTIVE | Noted: 2025-02-13

## 2025-02-13 PROCEDURE — 99223 1ST HOSP IP/OBS HIGH 75: CPT | Performed by: PHYSICAL MEDICINE & REHABILITATION

## 2025-02-13 NOTE — CARE PLAN
The patient's goals for the shift include No falls by end of shift    The clinical goals for the shift include Maintain pt safety

## 2025-02-13 NOTE — TELEPHONE ENCOUNTER
Pt Dtr., Donna is aware of need to reschedule 2/21/25 appt with Dr. Hill and that she can schedule a pessary appt with Kimberly while they wait for Dr. Hill's appt and surgery. Transferred to the  for scheduling.

## 2025-02-14 NOTE — DOCUMENTATION CLARIFICATION NOTE
"    PATIENT:               JANA CERVANTES  ACCT #:                  [id number]  MRN:                       [id number]  :                       1940  ADMIT DATE:       2025 2:00 PM  DISCH DATE:        2025 12:15 AM  RESPONDING PROVIDER #:        [id number]          PROVIDER RESPONSE TEXT:    I agree with the dietician diagnosis of severe malnutrition on 25.    CDI QUERY TEXT:    Clarification    Instruction:    Based on your assessment of the patient and the clinical information, please provide the requested documentation by clicking on the appropriate radio button and enter any additional information if prompted.    Question: Please further clarify this patient nutritional status as    When answering this query, please exercise your independent professional judgment. The fact that a question is being asked, does not imply that any particular answer is desired or expected.    The patient's clinical indicators include:  Clinical Information: 83 y/o female presented with AMS, fall. Recent ORIF for hip fracture.    Clinical Indicators: BMI 19.7     Nutrition Assessment: \"Malnutrition Diagnosis: Severe malnutrition related to chronic disease or condition  As Evidenced by: Greater than 7/5% weight loss in 3 months, severe fat loss and severe muscle loss.  Additional Assessment Information: Suspected prolonged poor oral intake\"    Treatment: Ensure TID, monitoring of weight and intake.    Risk Factors: age, dementia, recent hip fracture with repair.  Options provided:  -- I agree with the dietician diagnosis of severe malnutrition on 25.  -- Other - I will add my own diagnosis  -- Refer to Clinical Documentation Reviewer    Query created by: Elvira Gee on 2/10/2025 1:15 PM      Electronically signed by:  PINEDA NORTON MD 2025 7:39 AM          "

## 2025-02-17 ENCOUNTER — APPOINTMENT (OUTPATIENT)
Dept: OBSTETRICS AND GYNECOLOGY | Facility: CLINIC | Age: 85
End: 2025-02-17
Payer: MEDICARE

## 2025-02-18 ENCOUNTER — OFFICE VISIT (OUTPATIENT)
Dept: OBSTETRICS AND GYNECOLOGY | Facility: CLINIC | Age: 85
End: 2025-02-18
Payer: MEDICARE

## 2025-02-18 VITALS
TEMPERATURE: 98.4 F | OXYGEN SATURATION: 95 % | DIASTOLIC BLOOD PRESSURE: 87 MMHG | HEIGHT: 64 IN | BODY MASS INDEX: 16.73 KG/M2 | HEART RATE: 89 BPM | SYSTOLIC BLOOD PRESSURE: 147 MMHG | WEIGHT: 98 LBS | RESPIRATION RATE: 16 BRPM

## 2025-02-18 DIAGNOSIS — N81.10 PELVIC ORGAN PROLAPSE QUANTIFICATION STAGE 2 CYSTOCELE: ICD-10-CM

## 2025-02-18 DIAGNOSIS — Z46.89 PESSARY MAINTENANCE: ICD-10-CM

## 2025-02-18 DIAGNOSIS — N39.0 RECURRENT UTI: ICD-10-CM

## 2025-02-18 DIAGNOSIS — N81.9 VAGINAL VAULT PROLAPSE: Primary | ICD-10-CM

## 2025-02-18 PROCEDURE — 99213 OFFICE O/P EST LOW 20 MIN: CPT | Performed by: NURSE PRACTITIONER

## 2025-02-18 PROCEDURE — 57160 INSERT PESSARY/OTHER DEVICE: CPT | Performed by: NURSE PRACTITIONER

## 2025-02-18 PROCEDURE — 2720000010 HC PESSARY RING WITH SUPPORT: Performed by: NURSE PRACTITIONER

## 2025-02-18 ASSESSMENT — ENCOUNTER SYMPTOMS
DEPRESSION: 0
LOSS OF SENSATION IN FEET: 0
OCCASIONAL FEELINGS OF UNSTEADINESS: 1

## 2025-02-18 ASSESSMENT — PAIN SCALES - GENERAL: PAINLEVEL_OUTOF10: 0-NO PAIN

## 2025-02-21 ENCOUNTER — APPOINTMENT (OUTPATIENT)
Dept: OBSTETRICS AND GYNECOLOGY | Facility: CLINIC | Age: 85
End: 2025-02-21
Payer: MEDICARE

## 2025-02-24 NOTE — PATIENT INSTRUCTIONS
"To reach the Urogynecology nurses for Dr. Hill and Kimberly Graves, call [phone]. You will then select option 2 to be connected to the your provider's office and then option 3 for \"[facility] or Dom\". This will connect you with Annie or Dia Francisco.   "

## 2025-02-24 NOTE — PROGRESS NOTES
"HISTORY OF PRESENT ILLNESS:  Kimberly Randle is a 84 y.o. female, who presents in follow up for post-hysterectomy polapse management. Comorbidities include: dementia, HTN and CAD s/p CABG 20 years ago.    During last encounter on 10/24/2024, reviewed and agreed to the following:  - Pessary maintenance done for #3 ring with support, patient was continued on vaginal estrogen cream for UTI prevention as well as vaginal atrophy (was treated with silver nitrate at that visit)  - She had a fall and hip fracture in December and SNF admission afterwards  - When she returned to [facility], she was restarted on the vaginal estrogen. When inserting vaginal estrogen, she felt pessary and was not sure what it was because of her worsening dementia. She removed the pessary. Family thinks it was thrown away.   - Since pessary removal, she has had two culture proven UTIs, possibly because of incomplete emptying.   - Family had been very involved and had considered surgery in the past, however are moving towards that option now to prevent patient removing pessary again    Today she reports   - Goal today is to refit for a pessary.     The following were reviewed to gain additional history:  Test results:   Lab Results   Component Value Date    URINECULTURE No growth 02/04/2025    URINECULTURE >=100,000 CFU/mL Escherichia coli (A) 01/31/2025    URINECULTURE >=100,000 CFU/mL Escherichia coli (A) 01/11/2025    URINECULTURE No growth 04/25/2024              PHYSICAL EXAMINATION:  No LMP recorded. (Menstrual status: Menopausal).  Body mass index is 16.82 kg/m².  /87   Pulse 89   Temp 36.9 °C (98.4 °F) (Temporal)   Resp 16   Ht 1.626 m (5' 4\")   Wt (!) 44.5 kg (98 lb)   SpO2 95%   BMI 16.82 kg/m²     Physical Exam  Constitutional:       Appearance: Normal appearance. She is normal weight.   Genitourinary:      Anterior vaginal prolapse present.     Moderate vaginal atrophy present.  POP-Q measurements were:      " Aa: Ba: C:      gH: 2, pB: TVL: 6     Ap: Bp: D:   Pulmonary:      Effort: Pulmonary effort is normal.   Neurological:      Mental Status: She is alert.   Psychiatric:         Mood and Affect: Mood normal.         Behavior: Behavior normal.         Thought Content: Thought content normal.         Judgment: Judgment normal.     Urine dip:  No results found for this or any previous visit.     Pessary    Date/Time: 2/24/2025 1:08 PM    Performed by: SIERRA Stone  Authorized by: SIERRA Stone    Consent:     Procedural risks discussed: yes      Relevant documents present and verified: yes      Patient agrees, verbalizes understanding, and wants to proceed: yes      Consent given by:  Patient  Indication:     Indication for pessary: cystocele    Procedure:     Pessaries tried:  Failed #3 ring with support    Pessary type:  Ring w/ support    Pessary size:  2  Outcomes:     Patient tolerance of procedure:  Tolerated well, no immediate complications    IMPRESSION AND PLAN:  Kimberly Randle is a 84 y.o. who is being treated for stage 2 cystocele and recurrent UTI.     Plan    Post hysterectomy POP  Fit with a new #2 ring with support. Of note, prior to pessary being removed, she had worn a #3 ring with support. There seemed to be some vaginal stricture towards the apex, shortening the vaginal length. A #3 ring with support was initially inserted and caused pain.   Will hold off on restarting vaginal estrogen cream, hoping that keeping her focus off of the vaginal area will prevent her removing the pessary again  Planning for prolapse surgery with Margaret Osullivant schedueld 3/7/25 for surgical consult.  Recurrent UTI  Negative test of cure 2/8/25 and asymptomatic today   Discussed plan with daughter to restart vaginal estrogen cream for UTI prevention if this is remains an issue after surgery.      All questions and concerns were answered and addressed.  The patient expressed understanding  and agrees with the plan.     I, Kimberly Graves, personally performed the services described in the documentation as scribed in my presence and confirm it is both complete and accurate.

## 2025-02-25 ENCOUNTER — TELEPHONE (OUTPATIENT)
Dept: NEUROLOGY | Facility: CLINIC | Age: 85
End: 2025-02-25
Payer: MEDICARE

## 2025-02-25 NOTE — TELEPHONE ENCOUNTER
Pt's daughter is asking if there is any way you could help pt - states she needs to be taken off quetiapine 25mg bid and the nursing home ([facility]) will not stop this rx without a doctor's orders.  Pt was put on this medication by a physician in the hospital but has not been doing well on it - daughter states pt has been having hallucinations, is bringing up her  who passed several years ago and keeps saying she has to leave for work even though pt retired a long time ago.  Please let me know if you would like to try to give orders to discontinue this med.

## 2025-02-26 LAB
ATRIAL RATE: 88 BPM
P AXIS: 47 DEGREES
P OFFSET: 208 MS
P ONSET: 163 MS
PR INTERVAL: 134 MS
Q ONSET: 230 MS
QRS COUNT: 15 BEATS
QRS DURATION: 80 MS
QT INTERVAL: 352 MS
QTC CALCULATION(BAZETT): 425 MS
QTC FREDERICIA: 400 MS
R AXIS: -44 DEGREES
T AXIS: 63 DEGREES
T OFFSET: 406 MS
VENTRICULAR RATE: 88 BPM

## 2025-02-28 ENCOUNTER — LAB REQUISITION (OUTPATIENT)
Dept: LAB | Facility: HOSPITAL | Age: 85
End: 2025-02-28
Payer: MEDICARE

## 2025-02-28 LAB
APPEARANCE UR: CLEAR
BILIRUB UR STRIP.AUTO-MCNC: NEGATIVE MG/DL
COLOR UR: ABNORMAL
GLUCOSE UR STRIP.AUTO-MCNC: NORMAL MG/DL
KETONES UR STRIP.AUTO-MCNC: NEGATIVE MG/DL
LEUKOCYTE ESTERASE UR QL STRIP.AUTO: ABNORMAL
MUCOUS THREADS #/AREA URNS AUTO: ABNORMAL /LPF
NITRITE UR QL STRIP.AUTO: NEGATIVE
PH UR STRIP.AUTO: 6 [PH]
PROT UR STRIP.AUTO-MCNC: NEGATIVE MG/DL
RBC # UR STRIP.AUTO: NEGATIVE MG/DL
RBC #/AREA URNS AUTO: ABNORMAL /HPF
SP GR UR STRIP.AUTO: 1.02
SQUAMOUS #/AREA URNS AUTO: ABNORMAL /HPF
UROBILINOGEN UR STRIP.AUTO-MCNC: NORMAL MG/DL
WBC #/AREA URNS AUTO: >50 /HPF

## 2025-02-28 PROCEDURE — 87086 URINE CULTURE/COLONY COUNT: CPT | Mod: OUT,AHULAB | Performed by: INTERNAL MEDICINE

## 2025-02-28 PROCEDURE — 81001 URINALYSIS AUTO W/SCOPE: CPT | Mod: OUT | Performed by: INTERNAL MEDICINE

## 2025-03-01 LAB — HOLD SPECIMEN: NORMAL

## 2025-03-02 LAB — BACTERIA UR CULT: NORMAL

## 2025-03-05 NOTE — TELEPHONE ENCOUNTER
I was finally able to get in touch with the director of nursing, Richelle, and she will call you on your cell phone regarding this pt

## 2025-03-06 NOTE — PROGRESS NOTES
HISTORY OF PRESENT ILLNESS:  Kimberly Randle is a 84 y.o. female, who presents in follow up for vaginal vault prolapse, Elijah in setting of dementia (history of removing pessary due to confusion)     During last encounter on 2/18/25 ([facility]), reviewed and agreed to the following:  Post hysterectomy POP  Fit with a new #2 ring with support. Of note, prior to pessary being removed, she had worn a #3 ring with support. There seemed to be some vaginal stricture towards the apex, shortening the vaginal length. A #3 ring with support was initially inserted and caused pain.   Will hold off on restarting vaginal estrogen cream, hoping that keeping her focus off of the vaginal area will prevent her removing the pessary again  Planning for prolapse surgery with Liz. Franky acosta 3/7/25 for surgical consult.  Recurrent UTI  Negative test of cure 2/8/25 and asymptomatic today            Discussed plan with daughter to restart vaginal estrogen cream for UTI prevention if this is remains an issue after surgery.     Today she reports   Confused and has taken pessary out when trying to place vaginal estrogen    The following were reviewed to gain additional history:  External notes:   Test results:           Last POPQ by Dr. Omid De La O 12/3/20    POP-Q: Stage: 2. Aa: 0. Ba: 0 C: -5 Gh: 4. Pb: 2 TVL: 8 Ap: -2. Bp: -2. D: NA.     PHYSICAL EXAMINATION:  No LMP recorded. (Menstrual status: Menopausal).  There is no height or weight on file to calculate BMI.  There were no vitals taken for this visit.    General Appearance: well appearing  Neuro: Alert and oriented   HEENT: mucous membranes moist, neck supple  Resp: No respiratory distress, normal work of breathing  MSK: normal range of motion, gait appropriate    Pelvic:  Chaperone for pelvic exam:   Genitourinary:  normal external genitalia, Bartholin's glands, Delmar's glands negative,   Urethra normal meatus, non-tender, no periurethral mass  Vaginal mucosa  normal  Cervix  absent  Uterus absent  Adnexae  negative nontender, no masses  Atrophy negative    CST negative    POP-Q (in supine position):       Aa 0     Ba 0     C -5              gh 4     pb 3     tvl 5              Ap -2     Bp -2     D n/a    Rectal: no hemorrhoids, fissures or masses.    PVR (by ultrasound):       IMPRESSION AND PLAN:  Kimberly Randle is a 84 y.o. who presents in follow up for vaginal vault prolapse, Elijah in setting of dementia (history of removing pessary due to confusion)     Stage 2 cystocele  - based on recent exam by [facility], shortened vagina with stricture was palpated  - prolapse stage is relatively low which may make colpocleisis challenging  - shortened vagina would make SSLF hysteropexy challenging   - recommend continuing pessary maintenance and offered estring to avoid confusion regarding estrogen placement  - estring ordered and advised Donna (daughter) to call back if cost-prohibitive  - will plan to place at next pessary check    RTC for pessary check in 3 months with Liz    All questions and concerns were answered and addressed.  The patient expressed understanding and agrees with the plan.     Rosalba Hill MD

## 2025-03-07 ENCOUNTER — APPOINTMENT (OUTPATIENT)
Dept: OBSTETRICS AND GYNECOLOGY | Facility: CLINIC | Age: 85
End: 2025-03-07
Payer: MEDICARE

## 2025-03-07 ENCOUNTER — LAB REQUISITION (OUTPATIENT)
Dept: LAB | Facility: HOSPITAL | Age: 85
End: 2025-03-07
Payer: MEDICARE

## 2025-03-07 VITALS
BODY MASS INDEX: 16.73 KG/M2 | WEIGHT: 98 LBS | HEART RATE: 82 BPM | DIASTOLIC BLOOD PRESSURE: 76 MMHG | SYSTOLIC BLOOD PRESSURE: 121 MMHG | OXYGEN SATURATION: 95 % | RESPIRATION RATE: 16 BRPM | HEIGHT: 64 IN | TEMPERATURE: 97.9 F

## 2025-03-07 DIAGNOSIS — N95.2 VAGINAL ATROPHY: Primary | ICD-10-CM

## 2025-03-07 DIAGNOSIS — Z46.89 PESSARY MAINTENANCE: ICD-10-CM

## 2025-03-07 LAB
APPEARANCE UR: CLEAR
BACTERIA #/AREA URNS AUTO: ABNORMAL /HPF
BILIRUB UR STRIP.AUTO-MCNC: NEGATIVE MG/DL
COLOR UR: ABNORMAL
GLUCOSE UR STRIP.AUTO-MCNC: NORMAL MG/DL
KETONES UR STRIP.AUTO-MCNC: NEGATIVE MG/DL
LEUKOCYTE ESTERASE UR QL STRIP.AUTO: ABNORMAL
MUCOUS THREADS #/AREA URNS AUTO: ABNORMAL /LPF
NITRITE UR QL STRIP.AUTO: NEGATIVE
PH UR STRIP.AUTO: 5.5 [PH]
PROT UR STRIP.AUTO-MCNC: ABNORMAL MG/DL
RBC # UR STRIP.AUTO: ABNORMAL MG/DL
RBC #/AREA URNS AUTO: ABNORMAL /HPF
SP GR UR STRIP.AUTO: 1.02
SQUAMOUS #/AREA URNS AUTO: ABNORMAL /HPF
UROBILINOGEN UR STRIP.AUTO-MCNC: NORMAL MG/DL
WBC #/AREA URNS AUTO: >50 /HPF

## 2025-03-07 PROCEDURE — G2211 COMPLEX E/M VISIT ADD ON: HCPCS | Performed by: STUDENT IN AN ORGANIZED HEALTH CARE EDUCATION/TRAINING PROGRAM

## 2025-03-07 PROCEDURE — 1159F MED LIST DOCD IN RCRD: CPT | Performed by: STUDENT IN AN ORGANIZED HEALTH CARE EDUCATION/TRAINING PROGRAM

## 2025-03-07 PROCEDURE — 99214 OFFICE O/P EST MOD 30 MIN: CPT | Performed by: STUDENT IN AN ORGANIZED HEALTH CARE EDUCATION/TRAINING PROGRAM

## 2025-03-07 PROCEDURE — 1123F ACP DISCUSS/DSCN MKR DOCD: CPT | Performed by: STUDENT IN AN ORGANIZED HEALTH CARE EDUCATION/TRAINING PROGRAM

## 2025-03-07 PROCEDURE — 3074F SYST BP LT 130 MM HG: CPT | Performed by: STUDENT IN AN ORGANIZED HEALTH CARE EDUCATION/TRAINING PROGRAM

## 2025-03-07 PROCEDURE — 87086 URINE CULTURE/COLONY COUNT: CPT | Mod: OUT,AHULAB | Performed by: INTERNAL MEDICINE

## 2025-03-07 PROCEDURE — 3078F DIAST BP <80 MM HG: CPT | Performed by: STUDENT IN AN ORGANIZED HEALTH CARE EDUCATION/TRAINING PROGRAM

## 2025-03-07 PROCEDURE — 1111F DSCHRG MED/CURRENT MED MERGE: CPT | Performed by: STUDENT IN AN ORGANIZED HEALTH CARE EDUCATION/TRAINING PROGRAM

## 2025-03-07 PROCEDURE — 81001 URINALYSIS AUTO W/SCOPE: CPT | Mod: OUT | Performed by: INTERNAL MEDICINE

## 2025-03-07 PROCEDURE — 1036F TOBACCO NON-USER: CPT | Performed by: STUDENT IN AN ORGANIZED HEALTH CARE EDUCATION/TRAINING PROGRAM

## 2025-03-07 PROCEDURE — 1126F AMNT PAIN NOTED NONE PRSNT: CPT | Performed by: STUDENT IN AN ORGANIZED HEALTH CARE EDUCATION/TRAINING PROGRAM

## 2025-03-07 RX ORDER — DONEPEZIL HYDROCHLORIDE 5 MG/1
TABLET, FILM COATED ORAL
COMMUNITY
Start: 2025-03-05

## 2025-03-07 RX ORDER — ESTRADIOL 2 MG/1
2 SYSTEM VAGINAL
Qty: 1 EACH | Refills: 3 | Status: SHIPPED | OUTPATIENT
Start: 2025-03-07 | End: 2026-03-07

## 2025-03-07 ASSESSMENT — PAIN SCALES - GENERAL: PAINLEVEL_OUTOF10: 0-NO PAIN

## 2025-03-07 ASSESSMENT — ENCOUNTER SYMPTOMS
OCCASIONAL FEELINGS OF UNSTEADINESS: 1
DEPRESSION: 0
LOSS OF SENSATION IN FEET: 0

## 2025-03-07 ASSESSMENT — LIFESTYLE VARIABLES
HOW OFTEN DO YOU HAVE SIX OR MORE DRINKS ON ONE OCCASION: NEVER
SKIP TO QUESTIONS 9-10: 1
HOW OFTEN DO YOU HAVE A DRINK CONTAINING ALCOHOL: MONTHLY OR LESS
AUDIT-C TOTAL SCORE: 1
HOW MANY STANDARD DRINKS CONTAINING ALCOHOL DO YOU HAVE ON A TYPICAL DAY: 1 OR 2

## 2025-03-08 LAB — HOLD SPECIMEN: NORMAL

## 2025-03-09 LAB — BACTERIA UR CULT: NORMAL

## 2025-03-10 ENCOUNTER — APPOINTMENT (OUTPATIENT)
Dept: NEUROLOGY | Facility: CLINIC | Age: 85
End: 2025-03-10
Payer: MEDICARE

## 2025-03-10 VITALS
RESPIRATION RATE: 16 BRPM | TEMPERATURE: 97.5 F | HEART RATE: 83 BPM | DIASTOLIC BLOOD PRESSURE: 76 MMHG | HEIGHT: 64 IN | WEIGHT: 109.4 LBS | SYSTOLIC BLOOD PRESSURE: 124 MMHG | BODY MASS INDEX: 18.68 KG/M2

## 2025-03-10 DIAGNOSIS — F03.911 AGITATION DUE TO DEMENTIA (MULTI): Primary | ICD-10-CM

## 2025-03-10 DIAGNOSIS — F03.A0 MILD DEMENTIA WITHOUT BEHAVIORAL DISTURBANCE, PSYCHOTIC DISTURBANCE, MOOD DISTURBANCE, OR ANXIETY, UNSPECIFIED DEMENTIA TYPE: ICD-10-CM

## 2025-03-10 PROCEDURE — 1036F TOBACCO NON-USER: CPT | Performed by: PSYCHIATRY & NEUROLOGY

## 2025-03-10 PROCEDURE — 1159F MED LIST DOCD IN RCRD: CPT | Performed by: PSYCHIATRY & NEUROLOGY

## 2025-03-10 PROCEDURE — G8433 SCR FOR DEP NOT CPT DOC RSN: HCPCS | Performed by: PSYCHIATRY & NEUROLOGY

## 2025-03-10 PROCEDURE — 1123F ACP DISCUSS/DSCN MKR DOCD: CPT | Performed by: PSYCHIATRY & NEUROLOGY

## 2025-03-10 PROCEDURE — G2211 COMPLEX E/M VISIT ADD ON: HCPCS | Performed by: PSYCHIATRY & NEUROLOGY

## 2025-03-10 PROCEDURE — 1160F RVW MEDS BY RX/DR IN RCRD: CPT | Performed by: PSYCHIATRY & NEUROLOGY

## 2025-03-10 PROCEDURE — 3078F DIAST BP <80 MM HG: CPT | Performed by: PSYCHIATRY & NEUROLOGY

## 2025-03-10 PROCEDURE — 99215 OFFICE O/P EST HI 40 MIN: CPT | Performed by: PSYCHIATRY & NEUROLOGY

## 2025-03-10 PROCEDURE — 3074F SYST BP LT 130 MM HG: CPT | Performed by: PSYCHIATRY & NEUROLOGY

## 2025-03-10 PROCEDURE — 1111F DSCHRG MED/CURRENT MED MERGE: CPT | Performed by: PSYCHIATRY & NEUROLOGY

## 2025-03-10 ASSESSMENT — ENCOUNTER SYMPTOMS
AGITATION: 1
CONFUSION: 1
DEPRESSION: 0
OCCASIONAL FEELINGS OF UNSTEADINESS: 1
LOSS OF SENSATION IN FEET: 0

## 2025-03-10 NOTE — PATIENT INSTRUCTIONS
The patient's history, physical examination, CT amyloid PET scan, MRI and neuropsych testing are all consistent with dementia.  The patient's Mini-Mental status examination score was 23 today.  The patient does need to see Dr. Soliz.  The patient needs a folate and methylmalonic acid level.  The patient should continue Aricept.  The patient should try to stay as active as she can both mentally and physically.  We need to follow the patient closely for sleep and safety issues.  The patient should continue all of her medicines and stroke risk factor modification.  The patient needs to continue using her hearing aids on a consistent basis.  I discussed all these issues in detail with the patient and her and family and answered all their questions.  The patient follow-up with me in 6 months.

## 2025-03-10 NOTE — PROGRESS NOTES
Di Randle 84 y.o.  HPI  The patient, her daughter and son-in-law all attend the appointment today.  On December 26, 2024 the patient had a fall and fractured her right hip.  The patient was admitted to the hospital and had surgery and then went to rehab.  The family feels that the patient was more confused and agitated given her change in location.  The patient then went back to assisted living.  She then had another fall and went back to the hospital at [facility].  The patient had severe agitation in the hospital and had a CT scan of the brain done that was negative for any acute process.  The patient was subsequently diagnosed with a urinary tract infection and he took 3 different antibiotics until the urinary tract infection was treated properly.  The patient was placed on Seroquel and the family feels that the agitation and hallucinations significantly worsened.  The patient subsequently had her Seroquel discontinued.  The family feels that her agitation is improved but not resolved.  She is currently back on Aricept at 5 mg a day.  The patient was tried on 10 mg in the past and could not tolerated.  I did order an MRI of the brain that the patient had done on 12/18/2024 and it showed small vessel changes and some atrophy but no acute process or hemorrhage was noted.    The patient also had a CT amyloid PET scan that was positive.  On 10/25/2024 the patient had a B12 level and TSH level are both normal.    The family has banda of  for healthcare and finances and the patient has a living well.    Review of Systems   Psychiatric/Behavioral:  Positive for agitation and confusion.    All other systems reviewed and are negative.       Patient Active Problem List   Diagnosis    Abnormal echocardiogram    Cervicalgia    Coronary artery disease    Essential hypertension, benign    H/O four vessel coronary artery bypass graft    Hyperlipidemia    Pelvic organ prolapse quantification stage 2  cystocele    Premature atrial beats    Urinary frequency    Vaginal atrophy    Vaginal vault prolapse    Dementia    Atypical chest pain    Anxiety    Vomiting, unspecified vomiting type, unspecified whether nausea present    Closed fracture of neck of right femur, initial encounter    Fall at home, initial encounter    Fall, initial encounter    Debility    Acute encephalopathy    History of right hip hemiarthroplasty        Past Medical History:   Diagnosis Date    Personal history of other diseases of the circulatory system     History of coronary artery disease    Personal history of other diseases of the circulatory system     History of cardiac disorder    Personal history of other diseases of the circulatory system 12/06/2018    History of coronary artery disease    Personal history of other endocrine, nutritional and metabolic disease     History of hyperlipidemia    Personal history of other specified conditions     History of palpitations    Personal history of transient ischemic attack (TIA), and cerebral infarction without residual deficits     History of transient cerebral ischemia        Past Surgical History:   Procedure Laterality Date    CORONARY ARTERY BYPASS GRAFT  04/04/2018    CABG    HIP FRACTURE SURGERY Right     OTHER SURGICAL HISTORY  04/27/2018    Surgery    OTHER SURGICAL HISTORY  12/03/2020    Hysterectomy    OTHER SURGICAL HISTORY  12/03/2020    Lumpectomy        Social History     Socioeconomic History    Marital status:      Spouse name: Not on file    Number of children: Not on file    Years of education: Not on file    Highest education level: Not on file   Occupational History    Not on file   Tobacco Use    Smoking status: Former     Current packs/day: 1.50     Types: Cigarettes     Passive exposure: Past    Smokeless tobacco: Never   Vaping Use    Vaping status: Never Used   Substance and Sexual Activity    Alcohol use: Yes     Comment: rare    Drug use: Never    Sexual  activity: Not on file   Other Topics Concern    Not on file   Social History Narrative    Not on file     Social Drivers of Health     Financial Resource Strain: Patient Unable To Answer (2/5/2025)    Overall Financial Resource Strain (CARDIA)     Difficulty of Paying Living Expenses: Patient unable to answer   Food Insecurity: Patient Unable To Answer (2/5/2025)    Hunger Vital Sign     Worried About Running Out of Food in the Last Year: Patient unable to answer     Ran Out of Food in the Last Year: Patient unable to answer   Transportation Needs: Patient Unable To Answer (2/5/2025)    PRAPARE - Transportation     Lack of Transportation (Medical): Patient unable to answer     Lack of Transportation (Non-Medical): Patient unable to answer   Physical Activity: Insufficiently Active (6/17/2024)    Received from [employer]    Exercise Vital Sign     Days of Exercise per Week: 3 days     Minutes of Exercise per Session: 20 min   Stress: Stress Concern Present (6/17/2024)    Received from [employer]    [employer] - Occupational Stress Questionnaire     Feeling of Stress : To some extent   Social Connections: Unknown (12/27/2024)    Social Connection and Isolation Panel [NHANES]     Frequency of Communication with Friends and Family: Not on file     Frequency of Social Gatherings with Friends and Family: Not on file     Attends [employer] Services: Not on file     Active Member of Clubs or Organizations: Not on file     Attends Club or Organization Meetings: Not on file     Marital Status:    Intimate Partner Violence: Not At Risk (2/5/2025)    Humiliation, Afraid, Rape, and Kick questionnaire     Fear of Current or Ex-Partner: No     Emotionally Abused: No     Physically Abused: No     Sexually Abused: No   Housing Stability: Patient Unable To Answer (2/5/2025)    Housing Stability Vital Sign     Unable to Pay for Housing in the Last Year: Patient unable to  "answer     Number of Times Moved in the Last Year: 0     Homeless in the Last Year: Patient unable to answer        Family History   Problem Relation Name Age of Onset    Heart failure Mother      Cancer Father          Current Outpatient Medications   Medication Instructions    acetaminophen (TYLENOL) 650 mg, oral, Every 4 hours PRN    aspirin 81 mg, oral, 2 times daily    donepezil (Aricept) 5 mg tablet     Estring 2 mg, vaginal, Every 3 months, follow package directions    losartan (Cozaar) 25 mg tablet ONE tablet by mouth once daily FOR ESSENTIAL HYPERTENSION    multivitamin with minerals tablet 1 tablet, Daily    pantoprazole (PROTONIX) 40 mg, Daily before breakfast    polyethylene glycol (GLYCOLAX, MIRALAX) 17 g, oral, Daily PRN    rosuvastatin (CRESTOR) 40 mg, oral, Daily    tamsulosin (FLOMAX) 0.4 mg, oral, Daily    valACYclovir (Valtrex) 1 gram tablet TAKE TWO TABLETS BY MOUTH TWO TIMES A DAY FOR TWO DOSES AS NEEDED FOR COLD SORE        Allergies   Allergen Reactions    Atorvastatin Confusion, GI Upset, Other and Nausea Only    Donepezil Other     Severe nausea, vomiting, and diarrhea per daughter Donna    Amoxicillin Unknown    Codeine Unknown        Objective  /76 (BP Location: Left arm)   Pulse 83   Temp 36.4 °C (97.5 °F)   Resp 16   Ht 1.626 m (5' 4\")   Wt 49.6 kg (109 lb 6.4 oz)   BMI 18.78 kg/m²    GENERAL APPEARANCE:  No distress, alert and cooperative.     MENTAL STATE:  Orientation was normal to time, place and person.  The patient is able to remember 0 out of 3 objects at 5 minutes.  Attention span and concentration were normal. Language testing was normal for comprehension, repetition, expression, and naming. Calculation was intact. The patient could correctly interpret a picture, and copy a diagram. General fund of knowledge was intact.  The patient's Mini-Mental status examination score was 23 out of 30.    CRANIAL NERVES:  Cranial nerves were normal.      CN 2- Visual Acuity  OD: " 20/20 (corrected)   OS: 20/20 (corrected); visual fields full to confrontation.      CN 3, 4, 6-  Pupils round, 4 mm in diameter, equally reactive to light. No ptosis. EOMs normal alignment, full range of movement, no nystagmus     CN 5- Facial sensation intact bilaterally. Normal corneal reflexes.      CN 7- Normal and symmetric facial strength. Nasolabial folds symmetric.     CN 8- Hearing intact to finger rub, whisper.      CN 9- Palate elevates symmetrically. Normal gag reflex.      CN 11- Normal strength of shoulder shrug and neck turning      CN 12- Tongue midline, with normal bulk and strength; no fasciculations.     MOTOR:  Motor exam was normal. Muscle bulk and tone were normal in both upper and lower extremities. Muscle strength was 5/5 in distal and proximal muscles in both upper and lower extremities. No fasciculations, tremor or other abnormal movements were present.     GAIT: The patient has a mildly unsteady gait and is using a walker to ambulate.    Assessment/Plan   The patient's history, physical examination, CT amyloid PET scan, MRI and neuropsych testing are all consistent with dementia.  The patient's Mini-Mental status examination score was 23 today.  The patient does need to see Dr. Soliz.  The patient needs a folate and methylmalonic acid level.  The patient should continue Aricept.  The patient should try to stay as active as she can both mentally and physically.  We need to follow the patient closely for sleep and safety issues.  The patient should continue all of her medicines and stroke risk factor modification.  The patient needs to continue using her hearing aids on a consistent basis.  I discussed all these issues in detail with the patient and her and family and answered all their questions.  The patient follow-up with me in 6 months.

## 2025-03-17 ENCOUNTER — TELEPHONE (OUTPATIENT)
Dept: OBSTETRICS AND GYNECOLOGY | Facility: CLINIC | Age: 85
End: 2025-03-17
Payer: MEDICARE

## 2025-03-17 NOTE — TELEPHONE ENCOUNTER
Daughter (hudson) contacted.    She was informed we did not prescribe this medication.  Understanding voiced.

## 2025-04-01 ENCOUNTER — HOSPITAL ENCOUNTER (OUTPATIENT)
Dept: RADIOLOGY | Facility: CLINIC | Age: 85
Discharge: HOME | End: 2025-04-01
Payer: MEDICARE

## 2025-04-01 ENCOUNTER — OFFICE VISIT (OUTPATIENT)
Dept: ORTHOPEDIC SURGERY | Facility: CLINIC | Age: 85
End: 2025-04-01
Payer: MEDICARE

## 2025-04-01 DIAGNOSIS — Z96.641 S/P TOTAL RIGHT HIP ARTHROPLASTY: ICD-10-CM

## 2025-04-01 DIAGNOSIS — Z96.641 HISTORY OF RIGHT HIP HEMIARTHROPLASTY: Primary | ICD-10-CM

## 2025-04-01 PROCEDURE — 99213 OFFICE O/P EST LOW 20 MIN: CPT

## 2025-04-01 PROCEDURE — 1123F ACP DISCUSS/DSCN MKR DOCD: CPT

## 2025-04-01 PROCEDURE — 1036F TOBACCO NON-USER: CPT

## 2025-04-01 PROCEDURE — 73502 X-RAY EXAM HIP UNI 2-3 VIEWS: CPT | Mod: RT

## 2025-04-01 PROCEDURE — 1159F MED LIST DOCD IN RCRD: CPT

## 2025-04-01 PROCEDURE — 73502 X-RAY EXAM HIP UNI 2-3 VIEWS: CPT | Mod: RIGHT SIDE | Performed by: RADIOLOGY

## 2025-04-01 NOTE — PROGRESS NOTES
GUSTAVO Styles, PAAngeloC  Division of Adult Reconstruction  Phone: [phone]  Fax: [phone]          HPI:  Diagnosis: Comminuted fracture of the femoral neck   Procedure Performed: right Hip Hemiarthroplasty   Date of Surgery: December 27th, 2024    Kimberly Randle is a pleasant 84 y.o. year-old female here for regularly scheduled follow-up of their right hemiarthroplasty by Dr. Monsalve.  She resides at [facility]. The patient is accompanied by her daughter who is an occupational therapist. The patient is approximately 3 months postop. The patient has no specific complaints other than occasional discomfort. The patient has no mechanical symptoms. The patient has no swelling and no pain. The patient is using Tylenol as needed for pain control. They have been working on their home exercise program.  The patient is ambulatory with a rollator. The patient has had no interval fall or trauma. The patient's wound has healed uneventfully. The patient denies: fevers, chills, incisional drainage, joint instability, chest or calf pain, shortness of breath, and night sweats.  There are no complaints of numbness or tingling in the operative extremity. No complications postoperatively.        Review of systems  There has been no interval change in this patient's past medical, surgical, medications, allergies, family history or social history since the most recent visit to a provider within our department. 14 point review of systems was performed, reviewed, and negative except for pertinent positives documented in the history of present illness.    Past Medical History:   Diagnosis Date    Personal history of other diseases of the circulatory system     History of coronary artery disease    Personal history of other diseases of the circulatory system     History of cardiac disorder    Personal history of other diseases of the circulatory system 12/06/2018    History of coronary artery  disease    Personal history of other endocrine, nutritional and metabolic disease     History of hyperlipidemia    Personal history of other specified conditions     History of palpitations    Personal history of transient ischemic attack (TIA), and cerebral infarction without residual deficits     History of transient cerebral ischemia     Patient Active Problem List   Diagnosis    Abnormal echocardiogram    Cervicalgia    Coronary artery disease    Essential hypertension, benign    H/O four vessel coronary artery bypass graft    Hyperlipidemia    Pelvic organ prolapse quantification stage 2 cystocele    Premature atrial beats    Urinary frequency    Vaginal atrophy    Vaginal vault prolapse    Dementia    Atypical chest pain    Anxiety    Vomiting, unspecified vomiting type, unspecified whether nausea present    Closed fracture of neck of right femur, initial encounter    Fall at home, initial encounter    Fall, initial encounter    Debility    Acute encephalopathy    History of right hip hemiarthroplasty     Medication Documentation Review Audit       Reviewed by Daniel Love MD (Physician) on 03/10/25 at 1055      Medication Order Taking? Sig Documenting Provider Last Dose Status   acetaminophen (Tylenol) 325 mg tablet [id number] Yes Take 2 tablets (650 mg) by mouth every 4 hours if needed for mild pain (1 - 3). Casimiro May, APRN-CNP  Active   aspirin 81 mg EC tablet [id number] Yes Take 1 tablet (81 mg) by mouth 2 times a day. Carlos Kim MD  Active   donepezil (Aricept) 5 mg tablet [id number] Yes  Historical Provider, MD  Active   estradiol (Estring) 2 mg (7.5 mcg /24 hour) vaginal ring [id number] Yes Insert 2 mg into the vagina every 3 months. follow package directions Rosalba Hill MD  Active   losartan (Cozaar) 25 mg tablet [id number] Yes ONE tablet by mouth once daily FOR ESSENTIAL HYPERTENSION Sami Ford PA-C  Active   multivitamin with minerals tablet [id number] Yes Take 1 tablet by mouth once  daily. Historical Provider, MD  Active   pantoprazole (ProtoNix) 40 mg EC tablet [id number] Yes Take 1 tablet (40 mg) by mouth once daily in the morning. Take before meals. Do not crush, chew, or split. Historical Provider, MD  Active   polyethylene glycol (Glycolax, Miralax) 17 gram packet [id number] Yes Take 17 g by mouth once daily as needed (constipation). Sandoval Crisostomo MD  Active     Discontinued 03/07/25 1613   rosuvastatin (Crestor) 40 mg tablet [id number] Yes Take 1 tablet (40 mg) by mouth once daily. Mamadou Barrett MD  Active   tamsulosin (Flomax) 0.4 mg 24 hr capsule [id number] Yes Take 1 capsule (0.4 mg) by mouth once daily. Casimiro May, APRN-CNP  Active   valACYclovir (Valtrex) 1 gram tablet [id number] Yes TAKE TWO TABLETS BY MOUTH TWO TIMES A DAY FOR TWO DOSES AS NEEDED FOR COLD SORE Historical Provider, MD  Active                  Allergies   Allergen Reactions    Atorvastatin Confusion, GI Upset, Other and Nausea Only    Donepezil Other     Severe nausea, vomiting, and diarrhea per daughter Donna    Amoxicillin Unknown    Codeine Unknown     Social History     Socioeconomic History    Marital status:      Spouse name: Not on file    Number of children: Not on file    Years of education: Not on file    Highest education level: Not on file   Occupational History    Not on file   Tobacco Use    Smoking status: Former     Current packs/day: 1.50     Types: Cigarettes     Passive exposure: Past    Smokeless tobacco: Never   Vaping Use    Vaping status: Never Used   Substance and Sexual Activity    Alcohol use: Yes     Comment: rare    Drug use: Never    Sexual activity: Not on file   Other Topics Concern    Not on file   Social History Narrative    Not on file     Social Drivers of Health     Financial Resource Strain: Patient Unable To Answer (2/5/2025)    Overall Financial Resource Strain (CARDIA)     Difficulty of Paying Living Expenses: Patient unable to answer   Food Insecurity: Patient Unable To  Answer (2/5/2025)    Hunger Vital Sign     Worried About Running Out of Food in the Last Year: Patient unable to answer     Ran Out of Food in the Last Year: Patient unable to answer   Transportation Needs: Patient Unable To Answer (2/5/2025)    PRAPARE - Transportation     Lack of Transportation (Medical): Patient unable to answer     Lack of Transportation (Non-Medical): Patient unable to answer   Physical Activity: Insufficiently Active (6/17/2024)    Received from [employer]    Exercise Vital Sign     Days of Exercise per Week: 3 days     Minutes of Exercise per Session: 20 min   Stress: Stress Concern Present (6/17/2024)    Received from [employer]    [employer] [place] [employer] - Occupational Stress Questionnaire     Feeling of Stress : To some extent   Social Connections: Unknown (12/27/2024)    Social Connection and Isolation Panel [NHANES]     Frequency of Communication with Friends and Family: Not on file     Frequency of Social Gatherings with Friends and Family: Not on file     Attends Yazidism Services: Not on file     Active Member of Clubs or Organizations: Not on file     Attends Club or Organization Meetings: Not on file     Marital Status:    Intimate Partner Violence: Not At Risk (2/5/2025)    Humiliation, Afraid, Rape, and Kick questionnaire     Fear of Current or Ex-Partner: No     Emotionally Abused: No     Physically Abused: No     Sexually Abused: No   Housing Stability: Patient Unable To Answer (2/5/2025)    Housing Stability Vital Sign     Unable to Pay for Housing in the Last Year: Patient unable to answer     Number of Times Moved in the Last Year: 0     Homeless in the Last Year: Patient unable to answer     Past Surgical History:   Procedure Laterality Date    CORONARY ARTERY BYPASS GRAFT  04/04/2018    CABG    HIP FRACTURE SURGERY Right     OTHER SURGICAL HISTORY  04/27/2018    Surgery    OTHER SURGICAL HISTORY  12/03/2020     Hysterectomy    OTHER SURGICAL HISTORY  12/03/2020    Lumpectomy       Physical Exam:  General: Well-appearing female is alert and oriented x 3 and appears comfortable. NAD. Pleasant and cooperative.  Mood: Euthymic  Respirations: Non-labored  Gait: Slight Antalgic   Assistive Device: rollator. Coordination and balance intact.    right Hip  No other overlying lesion.  Wound: Incision is well healed with no signs of surrounding infection, erythema, fluctuance, dehiscence, drainage, or suture abscess. There is mild warmth and effusion about the hip, both consistent with the normal post-operative healing.   Range of motion: Surgical hip demonstrates painless free ROM through short arcs of motion.  Tenderness: None  Knee: Painless ROM of the knee.   Calf: No swelling, warmth, or tenderness. Lower extremity is well perfused.  Able to dorsi-flex and plantar-flex the ankle with appropriate strength. Able to wiggle all toes.   Neurovascular exam is at baseline.  The foot is warm and well perfused with palpable DP pulse.  Sensation is intact to light touch.     Imaging:  Radiographs of the operative hip were independently obtained and reviewed in clinic. The implant is well fixed and well aligned. No radiographic evidence of divina-implant fracture, lucency or dislocation. Compared to immediate post-operative x-rays, no change in appearance. Leg lengths closely restored.    Assesment/Plan:  Kimberly Randle is doing well and progressing well approximately 3 months s/p right nguyen arthroplasty. I am satisfied with the patient's recovery to this point.     A thorough discussion was had with the patient concerning the postoperative course and the patient is in agreement with the plan.  Today, hip precautions were lifted. Patient can progress activities as tolerated. You may increase your activities and get back to a normal, low-impact lifestyle. In office demonstration was provided on how to pick items off the floor while in a  seated or standing position to avoid posterior dislocation. Patient should also limit running, jumping, weight lifting and repetitive activity. The patient verbalizes understanding of these precautions.  Continued home exercise program or outpatient PT, per protocol to improve strength and stamina.   Continue with current pain regimen of Tylenol PRN.   Reviewed the need for prophylactic antibiotics prior to any dental or other invasive procedures. Patient understands that their dentist or myself may prescribe. This will continue until the pt is 2 years post op.    All questions were answered.    Follow-up in 9 months for 1 year post op visit with radiographs or sooner if there are any concerns.     GUSTAVO Rae, PAAngeloC  Orthopedic Physician Assistant  Division of Adult Reconstruction  Department of Orthopaedics  [place] [phone]

## 2025-04-21 PROBLEM — F01.50 VASCULAR DEMENTIA WITHOUT BEHAVIORAL DISTURBANCE, PSYCHOTIC DISTURBANCE, MOOD DISTURBANCE, OR ANXIETY: Status: ACTIVE | Noted: 2024-10-25

## 2025-04-21 PROBLEM — R11.10 VOMITING, UNSPECIFIED VOMITING TYPE, UNSPECIFIED WHETHER NAUSEA PRESENT: Status: RESOLVED | Noted: 2024-05-12 | Resolved: 2025-04-21

## 2025-04-21 PROBLEM — I10 HTN (HYPERTENSION): Status: ACTIVE | Noted: 2025-04-21

## 2025-05-07 ENCOUNTER — OFFICE VISIT (OUTPATIENT)
Dept: CARDIOLOGY | Facility: CLINIC | Age: 85
End: 2025-05-07
Payer: MEDICARE

## 2025-05-07 VITALS
HEART RATE: 91 BPM | DIASTOLIC BLOOD PRESSURE: 70 MMHG | HEIGHT: 64 IN | OXYGEN SATURATION: 95 % | SYSTOLIC BLOOD PRESSURE: 120 MMHG | WEIGHT: 117 LBS | BODY MASS INDEX: 19.97 KG/M2

## 2025-05-07 DIAGNOSIS — Z95.1 H/O FOUR VESSEL CORONARY ARTERY BYPASS GRAFT: ICD-10-CM

## 2025-05-07 DIAGNOSIS — I25.10 CORONARY ARTERY DISEASE INVOLVING NATIVE CORONARY ARTERY OF NATIVE HEART WITHOUT ANGINA PECTORIS: Primary | ICD-10-CM

## 2025-05-07 DIAGNOSIS — I49.1 PREMATURE ATRIAL BEATS: ICD-10-CM

## 2025-05-07 DIAGNOSIS — E78.5 HYPERLIPIDEMIA, UNSPECIFIED HYPERLIPIDEMIA TYPE: ICD-10-CM

## 2025-05-07 DIAGNOSIS — I10 ESSENTIAL HYPERTENSION, BENIGN: ICD-10-CM

## 2025-05-07 PROBLEM — R93.1 ABNORMAL ECHOCARDIOGRAM: Status: RESOLVED | Noted: 2023-10-21 | Resolved: 2025-05-07

## 2025-05-07 PROCEDURE — 99213 OFFICE O/P EST LOW 20 MIN: CPT | Performed by: INTERNAL MEDICINE

## 2025-05-07 PROCEDURE — 1036F TOBACCO NON-USER: CPT | Performed by: INTERNAL MEDICINE

## 2025-05-07 PROCEDURE — 3078F DIAST BP <80 MM HG: CPT | Performed by: INTERNAL MEDICINE

## 2025-05-07 PROCEDURE — 1159F MED LIST DOCD IN RCRD: CPT | Performed by: INTERNAL MEDICINE

## 2025-05-07 PROCEDURE — 3074F SYST BP LT 130 MM HG: CPT | Performed by: INTERNAL MEDICINE

## 2025-05-07 RX ORDER — TAMSULOSIN HYDROCHLORIDE 0.4 MG/1
CAPSULE ORAL
COMMUNITY
Start: 2025-04-25 | End: 2025-05-07 | Stop reason: WASHOUT

## 2025-05-07 RX ORDER — NITROGLYCERIN 0.4 MG/1
0.4 TABLET SUBLINGUAL EVERY 5 MIN PRN
COMMUNITY
Start: 2025-02-19

## 2025-05-07 RX ORDER — DOCUSATE SODIUM 100 MG/1
CAPSULE, LIQUID FILLED ORAL
COMMUNITY
Start: 2025-02-19 | End: 2025-05-07 | Stop reason: WASHOUT

## 2025-05-07 NOTE — PROGRESS NOTES
"Di Randle \"Sunitha\" is a 84 y.o. female.    Chief Complaint:  Follow-up coronary disease, coronary bypass grafting.    HPI    She denies having any anginal symptoms.  She is in an assisted living environment.  Doing well in this environment.  Seems to like the situation.  No anginal symptoms.  History is in part obtained from her daughter as the patient is beginning to have memory issues.  However on today's visit she is reasonably intact.  She has had multiple admissions for urinary tract infections.  On  she had a hip fracture.  She was seen by our service and was cleared for surgery.    In May 2023 she presented with symptoms of changing mental status.  We did do a CT of the brain which showed significant small vessel disease.  She was also seen and evaluated by the neurology service.       The patient's   in 2022 when he became suddenly unresponsive at home.  He was found to be in ventricular fibrillatory arrest and could not be resuscitated.     A stress thallium study performed in 2018 showed a fixed apical defect with an ejection fraction of 74%.     Her cardiac history is significant for coronary artery bypass grafting performed in  with a left internal mammary graft to the anterior descending, she has a right internal mammary graft to the right coronary artery. She has a radial artery graft to the diagonal branch and circumflex coronary artery.     Other medical problems including history of hyperlipidemia. She has a history of moderate aortic regurgitation.        Allergies  Medication    · atorvastatin   Adverse Reaction; Confusion; Gatrointestinal upset; Severe stomach cramping; Recorded By: Ana Chang; 2022 5:01:02 PM   · codeine   Recorded By: Majo Miller; 2018 1:52:11 PM     Family History  Mother    · Family history of congestive heart failure (V17.49) (Z82.49)     Social History  Problems    · Does not use illicit drugs " (V49.89) (Z78.9)   · Former smoker (V15.82) (Z87.891)   · Occasional alcohol use        Review of Systems   Constitutional: Positive for malaise/fatigue.   Cardiovascular:  Positive for dyspnea on exertion.   Musculoskeletal:  Positive for arthritis.   Neurological:  Positive for difficulty with concentration.  Memory loss.    Current Medications[1]     Visit Vitals  OB Status Menopausal   Smoking Status Former        Objective     Constitutional:       Appearance: Not in distress.   Neck:      Vascular: JVD normal.   Pulmonary:      Breath sounds: Normal breath sounds.   Cardiovascular:      Normal rate. Regular rhythm. Normal S1. Normal S2.       Murmurs: There is a grade 1/6 systolic murmur.      No gallop.    Pulses:     Intact distal pulses.   Edema:     Peripheral edema absent.   Abdominal:      General: There is no distension.      Palpations: Abdomen is soft.   Neurological:      Mental Status: Alert.         Lab Review:   Lab Results   Component Value Date     02/13/2025    K 4.0 02/13/2025     02/13/2025    CO2 26 02/13/2025    BUN 23 02/13/2025    CREATININE 0.72 02/13/2025    GLUCOSE 101 (H) 02/13/2025    CALCIUM 9.0 02/13/2025     Lab Results   Component Value Date    WBC 6.3 02/13/2025    HGB 12.7 02/13/2025    HCT 38.6 02/13/2025    MCV 92 02/13/2025     02/13/2025     Lab Results   Component Value Date    CHOL 156 11/23/2022    TRIG 125 11/23/2022    HDL 61.4 11/23/2022       Assessment:    1.  Coronary disease.  Status post coronary artery bypass grafting in 1998 with bilateral internal mammary grafts and a radial artery graft.  We personally reviewed his CT of the chest.  This was done with contrast.  We do not definitely visualize the radial artery graft.  This may be occluded.  However her latest echo study shows normal left ventricular function with no focal wall motion abnormalities.  Since she is free of angina we will continue medical management.    2.  Hypertension.  Blood  pressures are well-controlled.    3.  Hyperlipidemia.  No recent labs.         [1]   Current Outpatient Medications   Medication Sig Dispense Refill    acetaminophen (Tylenol) 325 mg tablet Take 2 tablets (650 mg) by mouth every 4 hours if needed for mild pain (1 - 3).      aspirin 81 mg EC tablet Take 1 tablet (81 mg) by mouth 2 times a day. 60 tablet 0    donepezil (Aricept) 5 mg tablet       estradiol (Estring) 2 mg (7.5 mcg /24 hour) vaginal ring Insert 2 mg into the vagina every 3 months. follow package directions 1 each 3    losartan (Cozaar) 25 mg tablet ONE tablet by mouth once daily FOR ESSENTIAL HYPERTENSION 31 tablet 11    multivitamin with minerals tablet Take 1 tablet by mouth once daily.      pantoprazole (ProtoNix) 40 mg EC tablet Take 1 tablet (40 mg) by mouth once daily in the morning. Take before meals. Do not crush, chew, or split.      polyethylene glycol (Glycolax, Miralax) 17 gram packet Take 17 g by mouth once daily as needed (constipation).      rosuvastatin (Crestor) 40 mg tablet Take 1 tablet (40 mg) by mouth once daily. 90 tablet 3    valACYclovir (Valtrex) 1 gram tablet TAKE TWO TABLETS BY MOUTH TWO TIMES A DAY FOR TWO DOSES AS NEEDED FOR COLD SORE       No current facility-administered medications for this visit.

## 2025-05-29 ENCOUNTER — APPOINTMENT (OUTPATIENT)
Dept: CARDIOLOGY | Facility: HOSPITAL | Age: 85
End: 2025-05-29
Payer: MEDICARE

## 2025-05-29 ENCOUNTER — APPOINTMENT (OUTPATIENT)
Dept: RADIOLOGY | Facility: HOSPITAL | Age: 85
End: 2025-05-29
Payer: MEDICARE

## 2025-05-29 ENCOUNTER — HOSPITAL ENCOUNTER (EMERGENCY)
Facility: HOSPITAL | Age: 85
Discharge: HOME | End: 2025-05-29
Attending: EMERGENCY MEDICINE
Payer: MEDICARE

## 2025-05-29 VITALS
BODY MASS INDEX: 19.66 KG/M2 | SYSTOLIC BLOOD PRESSURE: 138 MMHG | OXYGEN SATURATION: 97 % | HEART RATE: 70 BPM | HEIGHT: 65 IN | RESPIRATION RATE: 18 BRPM | TEMPERATURE: 98.6 F | DIASTOLIC BLOOD PRESSURE: 76 MMHG | WEIGHT: 118 LBS

## 2025-05-29 DIAGNOSIS — R07.9 CHEST PAIN, UNSPECIFIED TYPE: Primary | ICD-10-CM

## 2025-05-29 LAB
ALBUMIN SERPL BCP-MCNC: 4.2 G/DL (ref 3.4–5)
ALP SERPL-CCNC: 86 U/L (ref 33–136)
ALT SERPL W P-5'-P-CCNC: 15 U/L (ref 7–45)
ANION GAP SERPL CALC-SCNC: 12 MMOL/L (ref 10–20)
AST SERPL W P-5'-P-CCNC: 22 U/L (ref 9–39)
BASOPHILS # BLD AUTO: 0.04 X10*3/UL (ref 0–0.1)
BASOPHILS NFR BLD AUTO: 0.6 %
BILIRUB DIRECT SERPL-MCNC: 0.1 MG/DL (ref 0–0.3)
BILIRUB SERPL-MCNC: 0.6 MG/DL (ref 0–1.2)
BUN SERPL-MCNC: 26 MG/DL (ref 6–23)
CALCIUM SERPL-MCNC: 9.2 MG/DL (ref 8.6–10.3)
CARDIAC TROPONIN I PNL SERPL HS: 12 NG/L (ref 0–13)
CARDIAC TROPONIN I PNL SERPL HS: 9 NG/L (ref 0–13)
CHLORIDE SERPL-SCNC: 106 MMOL/L (ref 98–107)
CO2 SERPL-SCNC: 26 MMOL/L (ref 21–32)
CREAT SERPL-MCNC: 0.97 MG/DL (ref 0.5–1.05)
EGFRCR SERPLBLD CKD-EPI 2021: 57 ML/MIN/1.73M*2
EOSINOPHIL # BLD AUTO: 0.1 X10*3/UL (ref 0–0.4)
EOSINOPHIL NFR BLD AUTO: 1.6 %
ERYTHROCYTE [DISTWIDTH] IN BLOOD BY AUTOMATED COUNT: 14.8 % (ref 11.5–14.5)
GLUCOSE SERPL-MCNC: 87 MG/DL (ref 74–99)
HCT VFR BLD AUTO: 39.8 % (ref 36–46)
HGB BLD-MCNC: 13 G/DL (ref 12–16)
IMM GRANULOCYTES # BLD AUTO: 0.03 X10*3/UL (ref 0–0.5)
IMM GRANULOCYTES NFR BLD AUTO: 0.5 % (ref 0–0.9)
LYMPHOCYTES # BLD AUTO: 1.96 X10*3/UL (ref 0.8–3)
LYMPHOCYTES NFR BLD AUTO: 30.4 %
MCH RBC QN AUTO: 29.2 PG (ref 26–34)
MCHC RBC AUTO-ENTMCNC: 32.7 G/DL (ref 32–36)
MCV RBC AUTO: 89 FL (ref 80–100)
MONOCYTES # BLD AUTO: 0.57 X10*3/UL (ref 0.05–0.8)
MONOCYTES NFR BLD AUTO: 8.9 %
NEUTROPHILS # BLD AUTO: 3.74 X10*3/UL (ref 1.6–5.5)
NEUTROPHILS NFR BLD AUTO: 58 %
NRBC BLD-RTO: 0 /100 WBCS (ref 0–0)
PLATELET # BLD AUTO: 180 X10*3/UL (ref 150–450)
POTASSIUM SERPL-SCNC: 4.2 MMOL/L (ref 3.5–5.3)
PROT SERPL-MCNC: 6.3 G/DL (ref 6.4–8.2)
RBC # BLD AUTO: 4.45 X10*6/UL (ref 4–5.2)
SODIUM SERPL-SCNC: 140 MMOL/L (ref 136–145)
WBC # BLD AUTO: 6.4 X10*3/UL (ref 4.4–11.3)

## 2025-05-29 PROCEDURE — 71045 X-RAY EXAM CHEST 1 VIEW: CPT

## 2025-05-29 PROCEDURE — 36415 COLL VENOUS BLD VENIPUNCTURE: CPT | Performed by: EMERGENCY MEDICINE

## 2025-05-29 PROCEDURE — 93005 ELECTROCARDIOGRAM TRACING: CPT

## 2025-05-29 PROCEDURE — 99285 EMERGENCY DEPT VISIT HI MDM: CPT | Mod: 25 | Performed by: EMERGENCY MEDICINE

## 2025-05-29 PROCEDURE — 85025 COMPLETE CBC W/AUTO DIFF WBC: CPT | Performed by: EMERGENCY MEDICINE

## 2025-05-29 PROCEDURE — 82248 BILIRUBIN DIRECT: CPT | Performed by: EMERGENCY MEDICINE

## 2025-05-29 PROCEDURE — 84484 ASSAY OF TROPONIN QUANT: CPT | Performed by: EMERGENCY MEDICINE

## 2025-05-29 PROCEDURE — 71045 X-RAY EXAM CHEST 1 VIEW: CPT | Performed by: STUDENT IN AN ORGANIZED HEALTH CARE EDUCATION/TRAINING PROGRAM

## 2025-05-29 ASSESSMENT — PAIN SCALES - GENERAL
PAINLEVEL_OUTOF10: 0 - NO PAIN
PAINLEVEL_OUTOF10: 0 - NO PAIN

## 2025-05-29 ASSESSMENT — HEART SCORE
RISK FACTORS: >2 RISK FACTORS OR HX OF ATHEROSCLEROTIC DISEASE
ECG: NON-SPECIFIC REPOLARIZATION DISTURBANCE
HISTORY: SLIGHTLY SUSPICIOUS
TROPONIN: LESS THAN OR EQUAL TO NORMAL LIMIT
AGE: 65+
HEART SCORE: 5

## 2025-05-29 ASSESSMENT — PAIN - FUNCTIONAL ASSESSMENT
PAIN_FUNCTIONAL_ASSESSMENT: 0-10
PAIN_FUNCTIONAL_ASSESSMENT: 0-10

## 2025-05-29 NOTE — ED TRIAGE NOTES
The pt waken up by sever mid sternal chest pain went into her stomach then disappeared pt had a quad bypass 10 years ago ano X3 the pt denies any pain

## 2025-05-29 NOTE — ED PROVIDER NOTES
Emergency Department Provider Note       HPI: []  84-year white female history of hypertension, dementia, high CAD, CABG over 10 years ago comes in with chest pain.  She states this morning at 5 AM she had fever and episode of chest pain.  Only for couple of minutes.  Nonradiating pain.  She had no associated diaphoresis no shortness of breath no syncope or [place] be currently symptom-free.  Currently pain-free.  No history of cardiac stents.  Did not pass out.  She denies abdominal pain nausea Medary fever chills cough congestion incontinence seizures denies recent travel or hospitalization.  No leg swelling.    Past history: Hypertension, CAD, CABG, mild dementia  Social: Patient denies a current tobacco alcohol drug abuse.  REVIEW OF SYSTEMS:    GENERAL.: No weight loss, fatigue, anorexia, insomnia, fever.    EYES: No vision loss, double vision, drainage, eye pain.    ENT: No pharyngitis, dry mouth.    CARDIOPULMONARY: Positive for transient chest pain, palpitations, syncope, near syncope. No shortness of breath, cough, hemoptysis.    GI: No abdominal pain, change in bowel habits, melena, hematemesis, hematochezia, nausea, vomiting, diarrhea.    : No discharge, dysuria, frequency, urgency, hematuria.    MS: No limb pain, joint pain, joint swelling.    SKIN: No rashes.    PSYCH: No depression, anxiety, suicidality, homicidality.    Review of systems is otherwise negative unless stated above or in history of present illness.  Social history, family history, allergies reviewed.  PHYSICAL EXAM:    GENERAL: Vitals noted, no distress. Alert and oriented  x 3. Non-toxic.  Very pleasant at her baseline mental status    EENT: TMs clear. Posterior oropharynx unremarkable. No meningismus. No LAD.     NECK: Supple. Nontender. No midline tenderness.     CARDIAC: Regular, rate, rhythm. No murmurs rubs or gallops. No JVD    PULMONARY: Lungs clear bilaterally with good aeration. No wheezes rales or rhonchi. No respiratory  distress.     ABDOMEN: Soft, nonsurgical. Nontender. No peritoneal signs. Normoactive bowel sounds. No pulsatile masses.     EXTREMITIES: No peripheral edema. Negative Homans bilaterally, no cords.  2+ bounding pulses well-perfused.    SKIN: No rash. Intact.     NEURO: No focal neurologic deficits, NIH score of 0. Cranial nerves normal as tested from II through XII.     MEDICAL DECISION MAKING:    EKG on my interpretation shows normal sinus rhythm left axis deviation rate mid extreme with no acute ischemic changes.  Unchanged from prior EKGs.    CBC with differential chemistry LFTs are normal troponin x 2 is negative chest x-ray negative.    Treatment in the ED: Established patient on cardiac monitor.    ED course: Patient mariusz asymptomatic remains pain-free remains afebrile normotensive no tachycardia or hypoxia.    Impression: Chest pain atypical    Plans and MDM: 84-year-old white female history of known CAD remote CABG comes in with a very brief history of chest pain which was self-limiting currently symptom-free.  EKG is reassuring unchanged prior EKGs troponin x 2 is negative my suspicion for ACS dissection pulm embolism STEMI or NSTEMI is low.  Patient be discharged back to her facility by supportive care outpatient follow-up recommended outpatient cardiac stress recommended with strict return precaution.                                                          Iam Hendrix MD  05/29/25 1003       Iam Hendrix MD  05/29/25 1003

## 2025-06-01 LAB
ATRIAL RATE: 79 BPM
P AXIS: 57 DEGREES
P OFFSET: 186 MS
P ONSET: 147 MS
PR INTERVAL: 136 MS
Q ONSET: 215 MS
QRS COUNT: 13 BEATS
QRS DURATION: 74 MS
QT INTERVAL: 350 MS
QTC CALCULATION(BAZETT): 401 MS
QTC FREDERICIA: 383 MS
R AXIS: -66 DEGREES
T AXIS: 59 DEGREES
T OFFSET: 390 MS
VENTRICULAR RATE: 79 BPM

## 2025-06-03 NOTE — PROGRESS NOTES
HISTORY OF PRESENT ILLNESS:  Kimberly Randle is a 84 y.o. female, who presents in follow up for stage 2 cystocele, Elijah     During last encounter on 3/7/25, reviewed and agreed to the following:  Kimberly Randle is a 84 y.o. who presents in follow up for vaginal vault prolapse, Elijah in setting of dementia (history of removing pessary due to confusion)      Stage 2 cystocele  - based on recent exam by Greererssyeda, shortened vagina with stricture was palpated  - prolapse stage is relatively low which may make colpocleisis challenging  - shortened vagina would make SSLF hysteropexy challenging   - recommend continuing pessary maintenance and offered estring to avoid confusion regarding estrogen placement  - estring ordered and advised Donna (daughter) to call back if cost-prohibitive  - will plan to place at next pessary check     RTC for pessary check in 3 months with Liz    Today she reports   ***    The following were reviewed to gain additional history:  External notes: ED note 5/29/25, eval for chest pain, resolved at time of eval and workup negative  Test results: Cr 0.97 5/29/25          PHYSICAL EXAMINATION:  No LMP recorded. (Menstrual status: Menopausal).  There is no height or weight on file to calculate BMI.  There were no vitals taken for this visit.    General Appearance: well appearing  Neuro: Alert and oriented   HEENT: mucous membranes moist, neck supple  Resp: No respiratory distress, normal work of breathing  MSK: normal range of motion, gait appropriate    Pelvic:  Chaperone for pelvic exam:   Genitourinary: *** normal external genitalia, [place]th[place]'s glands, [place]'s glands negative,   Urethra ***normal meatus, non-tender, no periurethral mass  Vaginal mucosa *** normal  Cervix *** normal  Uterus ***normal size, nontender  Adnexae *** negative nontender, no masses  Atrophy {POSITIVE/NEGATIVE:22206}    CST {POSITIVE/NEGATIVE:06220}  Pelvic floor muscle contraction  ***/5    POP-Q (in supine  position):       Aa ***     Ba ***     C ***              gh ***     pb ***     tvl ***              Ap ***     Bp ***     D ***    Rectal: no hemorrhoids, fissures or masses.    PVR (by ultrasound): ***      IMPRESSION AND PLAN:  Kimberly Randle is a 84 y.o. who presents in follow up for stage 2 cystocele, Elijah. Complex PMH including HTN, dementia, CAD, CABG    Stage 2 cystocele  - prior exam with stricture and shortened vagina; not a good candidate for surgical management  - managing with pessary and estring (placed on exam today ***)    Elijah  - continue with estring, placed today as above ***    RTC ***    All questions and concerns were answered and addressed.  The patient expressed understanding and agrees with the plan.

## 2025-06-06 ENCOUNTER — APPOINTMENT (OUTPATIENT)
Dept: OBSTETRICS AND GYNECOLOGY | Facility: CLINIC | Age: 85
End: 2025-06-06
Payer: MEDICARE

## 2025-06-13 ENCOUNTER — APPOINTMENT (OUTPATIENT)
Dept: OBSTETRICS AND GYNECOLOGY | Facility: CLINIC | Age: 85
End: 2025-06-13
Payer: MEDICARE

## 2025-06-13 VITALS
WEIGHT: 118 LBS | OXYGEN SATURATION: 96 % | HEIGHT: 63 IN | TEMPERATURE: 97.5 F | SYSTOLIC BLOOD PRESSURE: 133 MMHG | BODY MASS INDEX: 20.91 KG/M2 | DIASTOLIC BLOOD PRESSURE: 68 MMHG | HEART RATE: 76 BPM

## 2025-06-13 DIAGNOSIS — N81.9 VAGINAL VAULT PROLAPSE: ICD-10-CM

## 2025-06-13 DIAGNOSIS — N39.0 RECURRENT UTI: Primary | ICD-10-CM

## 2025-06-13 PROCEDURE — 99214 OFFICE O/P EST MOD 30 MIN: CPT | Performed by: STUDENT IN AN ORGANIZED HEALTH CARE EDUCATION/TRAINING PROGRAM

## 2025-06-13 PROCEDURE — 3075F SYST BP GE 130 - 139MM HG: CPT | Performed by: STUDENT IN AN ORGANIZED HEALTH CARE EDUCATION/TRAINING PROGRAM

## 2025-06-13 PROCEDURE — 3078F DIAST BP <80 MM HG: CPT | Performed by: STUDENT IN AN ORGANIZED HEALTH CARE EDUCATION/TRAINING PROGRAM

## 2025-06-13 PROCEDURE — G2211 COMPLEX E/M VISIT ADD ON: HCPCS | Performed by: STUDENT IN AN ORGANIZED HEALTH CARE EDUCATION/TRAINING PROGRAM

## 2025-06-13 PROCEDURE — 1126F AMNT PAIN NOTED NONE PRSNT: CPT | Performed by: STUDENT IN AN ORGANIZED HEALTH CARE EDUCATION/TRAINING PROGRAM

## 2025-06-13 ASSESSMENT — ENCOUNTER SYMPTOMS
OCCASIONAL FEELINGS OF UNSTEADINESS: 1
DEPRESSION: 0
LOSS OF SENSATION IN FEET: 0

## 2025-06-13 ASSESSMENT — PAIN SCALES - GENERAL: PAINLEVEL_OUTOF10: 0-NO PAIN

## 2025-06-13 NOTE — PROGRESS NOTES
"HISTORY OF PRESENT ILLNESS:  Kimberly Randle is a 84 y.o. female, who presents in follow up for stage 2 cystocele, Elijah     During last encounter on 3/7/25, reviewed and agreed to the following:  Kimberly Randle is a 84 y.o. who presents in follow up for vaginal vault prolapse, Elijah in setting of dementia (history of removing pessary due to confusion)      Stage 2 cystocele  - based on recent exam by Greererssyeda, shortened vagina with stricture was palpated  - prolapse stage is relatively low which may make colpocleisis challenging  - shortened vagina would make SSLF hysteropexy challenging   - recommend continuing pessary maintenance and offered estring to avoid confusion regarding estrogen placement  - estring ordered and advised Donna (daughter) to call back if cost-prohibitive  - will plan to place at next pessary check     RTC for pessary check in 3 months with Liz    Today she reports   No issues.    The following were reviewed to gain additional history:  External notes: ED note 5/29/25, eval for chest pain, resolved at time of eval and workup negative  Test results: Cr 0.97 5/29/25          PHYSICAL EXAMINATION:  No LMP recorded. (Menstrual status: Menopausal).  Body mass index is 20.9 kg/m².  /68   Pulse 76   Temp 36.4 °C (97.5 °F) (Temporal)   Ht 1.6 m (5' 3\")   Wt 53.5 kg (118 lb)   SpO2 96%   BMI 20.90 kg/m²     General Appearance: well appearing  Neuro: Alert and oriented   HEENT: mucous membranes moist, neck supple  Resp: No respiratory distress, normal work of breathing  MSK: normal range of motion, gait appropriate    Pelvic:  Chaperone for pelvic exam:   Genitourinary:  normal external genitalia, [place]th[place]'s glands, [place]'s glands negative,   Urethra normal meatus, non-tender, no periurethral mass  Vaginal mucosa  normal  Atrophy negative    CST negative    Pessary cleaning performed  Estring placed on exam    Rectal: no hemorrhoids, fissures or masses.    PVR (by ultrasound): "       IMPRESSION AND PLAN:  Kimberly Randle is a 84 y.o. who presents in follow up for stage 2 cystocele, Elijah. Complex PMH including HTN, dementia, CAD, CABG    Stage 2 cystocele  - prior exam with stricture and shortened vagina; not a good candidate for surgical management  - managing with pessary and estring (placed on exam today)    Elijah  - continue with estring, placed today as above     RTC 3 months for pessary check    All questions and concerns were answered and addressed.  The patient expressed understanding and agrees with the plan.

## 2025-07-25 ENCOUNTER — APPOINTMENT (OUTPATIENT)
Dept: OBSTETRICS AND GYNECOLOGY | Facility: CLINIC | Age: 85
End: 2025-07-25
Payer: MEDICARE

## 2025-08-06 ENCOUNTER — APPOINTMENT (OUTPATIENT)
Dept: OBSTETRICS AND GYNECOLOGY | Facility: CLINIC | Age: 85
End: 2025-08-06
Payer: MEDICARE

## 2025-08-29 ENCOUNTER — OFFICE VISIT (OUTPATIENT)
Dept: OBSTETRICS AND GYNECOLOGY | Facility: CLINIC | Age: 85
End: 2025-08-29
Payer: MEDICARE

## 2025-08-29 VITALS
WEIGHT: 118 LBS | SYSTOLIC BLOOD PRESSURE: 159 MMHG | OXYGEN SATURATION: 99 % | BODY MASS INDEX: 20.91 KG/M2 | TEMPERATURE: 93.4 F | HEART RATE: 79 BPM | DIASTOLIC BLOOD PRESSURE: 82 MMHG | HEIGHT: 63 IN

## 2025-08-29 DIAGNOSIS — Z46.89 FITTING AND ADJUSTMENT OF PESSARY: ICD-10-CM

## 2025-08-29 DIAGNOSIS — Z46.89 PESSARY MAINTENANCE: Primary | ICD-10-CM

## 2025-08-29 DIAGNOSIS — N95.2 VAGINAL ATROPHY: ICD-10-CM

## 2025-08-29 DIAGNOSIS — N39.0 RECURRENT UTI: ICD-10-CM

## 2025-08-29 PROCEDURE — 1159F MED LIST DOCD IN RCRD: CPT | Performed by: STUDENT IN AN ORGANIZED HEALTH CARE EDUCATION/TRAINING PROGRAM

## 2025-08-29 PROCEDURE — 2720000010 HC PESSARY RING WITH SUPPORT: Performed by: STUDENT IN AN ORGANIZED HEALTH CARE EDUCATION/TRAINING PROGRAM

## 2025-08-29 PROCEDURE — 3077F SYST BP >= 140 MM HG: CPT | Performed by: STUDENT IN AN ORGANIZED HEALTH CARE EDUCATION/TRAINING PROGRAM

## 2025-08-29 PROCEDURE — 57160 INSERT PESSARY/OTHER DEVICE: CPT | Performed by: STUDENT IN AN ORGANIZED HEALTH CARE EDUCATION/TRAINING PROGRAM

## 2025-08-29 PROCEDURE — 99215 OFFICE O/P EST HI 40 MIN: CPT | Mod: 25 | Performed by: STUDENT IN AN ORGANIZED HEALTH CARE EDUCATION/TRAINING PROGRAM

## 2025-08-29 PROCEDURE — 3079F DIAST BP 80-89 MM HG: CPT | Performed by: STUDENT IN AN ORGANIZED HEALTH CARE EDUCATION/TRAINING PROGRAM

## 2025-08-29 PROCEDURE — 1126F AMNT PAIN NOTED NONE PRSNT: CPT | Performed by: STUDENT IN AN ORGANIZED HEALTH CARE EDUCATION/TRAINING PROGRAM

## 2025-08-29 PROCEDURE — 99215 OFFICE O/P EST HI 40 MIN: CPT | Performed by: STUDENT IN AN ORGANIZED HEALTH CARE EDUCATION/TRAINING PROGRAM

## 2025-08-29 ASSESSMENT — PAIN SCALES - GENERAL: PAINLEVEL_OUTOF10: 0-NO PAIN

## 2025-08-29 ASSESSMENT — ENCOUNTER SYMPTOMS
DEPRESSION: 0
OCCASIONAL FEELINGS OF UNSTEADINESS: 1
LOSS OF SENSATION IN FEET: 1

## 2025-09-02 ENCOUNTER — HOSPITAL ENCOUNTER (EMERGENCY)
Facility: HOSPITAL | Age: 85
Discharge: HOME | End: 2025-09-02
Attending: INTERNAL MEDICINE
Payer: MEDICARE

## 2025-09-02 ENCOUNTER — APPOINTMENT (OUTPATIENT)
Dept: CARDIOLOGY | Facility: HOSPITAL | Age: 85
End: 2025-09-02
Payer: MEDICARE

## 2025-09-02 ENCOUNTER — APPOINTMENT (OUTPATIENT)
Dept: RADIOLOGY | Facility: HOSPITAL | Age: 85
End: 2025-09-02
Payer: MEDICARE

## 2025-09-02 VITALS
BODY MASS INDEX: 21.26 KG/M2 | HEART RATE: 62 BPM | OXYGEN SATURATION: 96 % | TEMPERATURE: 97.7 F | SYSTOLIC BLOOD PRESSURE: 178 MMHG | WEIGHT: 120 LBS | HEIGHT: 63 IN | RESPIRATION RATE: 16 BRPM | DIASTOLIC BLOOD PRESSURE: 74 MMHG

## 2025-09-02 DIAGNOSIS — T83.511A URINARY TRACT INFECTION ASSOCIATED WITH CATHETERIZATION OF URINARY TRACT, UNSPECIFIED INDWELLING URINARY CATHETER TYPE, INITIAL ENCOUNTER: ICD-10-CM

## 2025-09-02 DIAGNOSIS — N39.0 URINARY TRACT INFECTION ASSOCIATED WITH CATHETERIZATION OF URINARY TRACT, UNSPECIFIED INDWELLING URINARY CATHETER TYPE, INITIAL ENCOUNTER: ICD-10-CM

## 2025-09-02 DIAGNOSIS — R11.2 NAUSEA AND VOMITING, UNSPECIFIED VOMITING TYPE: Primary | ICD-10-CM

## 2025-09-02 LAB
ALBUMIN SERPL BCP-MCNC: 3.9 G/DL (ref 3.4–5)
ALP SERPL-CCNC: 113 U/L (ref 33–136)
ALT SERPL W P-5'-P-CCNC: 14 U/L (ref 7–45)
ANION GAP BLDV CALCULATED.4IONS-SCNC: 7 MMOL/L (ref 10–25)
ANION GAP SERPL CALC-SCNC: 11 MMOL/L (ref 10–20)
APPEARANCE UR: ABNORMAL
AST SERPL W P-5'-P-CCNC: 23 U/L (ref 9–39)
BASE EXCESS BLDV CALC-SCNC: 0.3 MMOL/L (ref -2–3)
BASOPHILS # BLD AUTO: 0.02 X10*3/UL (ref 0–0.1)
BASOPHILS NFR BLD AUTO: 0.3 %
BILIRUB SERPL-MCNC: 0.5 MG/DL (ref 0–1.2)
BILIRUB UR STRIP.AUTO-MCNC: NEGATIVE MG/DL
BNP SERPL-MCNC: 190 PG/ML (ref 0–99)
BODY TEMPERATURE: 37 DEGREES CELSIUS
BUN SERPL-MCNC: 21 MG/DL (ref 6–23)
CA-I BLDV-SCNC: 1.19 MMOL/L (ref 1.1–1.33)
CALCIUM SERPL-MCNC: 8.9 MG/DL (ref 8.6–10.3)
CARDIAC TROPONIN I PNL SERPL HS: 8 NG/L (ref 0–13)
CARDIAC TROPONIN I PNL SERPL HS: 9 NG/L (ref 0–13)
CHLORIDE BLDV-SCNC: 101 MMOL/L (ref 98–107)
CHLORIDE SERPL-SCNC: 100 MMOL/L (ref 98–107)
CO2 SERPL-SCNC: 26 MMOL/L (ref 21–32)
COLOR UR: YELLOW
CREAT SERPL-MCNC: 0.74 MG/DL (ref 0.5–1.05)
EGFRCR SERPLBLD CKD-EPI 2021: 78 ML/MIN/1.73M*2
EOSINOPHIL # BLD AUTO: 0.01 X10*3/UL (ref 0–0.4)
EOSINOPHIL NFR BLD AUTO: 0.1 %
ERYTHROCYTE [DISTWIDTH] IN BLOOD BY AUTOMATED COUNT: 13.5 % (ref 11.5–14.5)
GLUCOSE BLDV-MCNC: 143 MG/DL (ref 74–99)
GLUCOSE SERPL-MCNC: 139 MG/DL (ref 74–99)
GLUCOSE UR STRIP.AUTO-MCNC: NORMAL MG/DL
HCO3 BLDV-SCNC: 26.5 MMOL/L (ref 22–26)
HCT VFR BLD AUTO: 38.3 % (ref 36–46)
HCT VFR BLD EST: 38 % (ref 36–46)
HGB BLD-MCNC: 12.2 G/DL (ref 12–16)
HGB BLDV-MCNC: 12.7 G/DL (ref 12–16)
IMM GRANULOCYTES # BLD AUTO: 0.01 X10*3/UL (ref 0–0.5)
IMM GRANULOCYTES NFR BLD AUTO: 0.1 % (ref 0–0.9)
INHALED O2 CONCENTRATION: 21 %
KETONES UR STRIP.AUTO-MCNC: ABNORMAL MG/DL
LACTATE BLDV-SCNC: 1.1 MMOL/L (ref 0.4–2)
LACTATE SERPL-SCNC: 0.9 MMOL/L (ref 0.4–2)
LEUKOCYTE ESTERASE UR QL STRIP.AUTO: ABNORMAL
LIPASE SERPL-CCNC: 11 U/L (ref 9–82)
LYMPHOCYTES # BLD AUTO: 0.63 X10*3/UL (ref 0.8–3)
LYMPHOCYTES NFR BLD AUTO: 8.6 %
MCH RBC QN AUTO: 29.3 PG (ref 26–34)
MCHC RBC AUTO-ENTMCNC: 31.9 G/DL (ref 32–36)
MCV RBC AUTO: 92 FL (ref 80–100)
MONOCYTES # BLD AUTO: 0.18 X10*3/UL (ref 0.05–0.8)
MONOCYTES NFR BLD AUTO: 2.5 %
MUCOUS THREADS #/AREA URNS AUTO: ABNORMAL /LPF
NEUTROPHILS # BLD AUTO: 6.46 X10*3/UL (ref 1.6–5.5)
NEUTROPHILS NFR BLD AUTO: 88.4 %
NITRITE UR QL STRIP.AUTO: NEGATIVE
NRBC BLD-RTO: 0 /100 WBCS (ref 0–0)
OXYHGB MFR BLDV: 75.7 % (ref 45–75)
PCO2 BLDV: 48 MM HG (ref 41–51)
PH BLDV: 7.35 PH (ref 7.33–7.43)
PH UR STRIP.AUTO: 7 [PH]
PLATELET # BLD AUTO: 183 X10*3/UL (ref 150–450)
PO2 BLDV: 46 MM HG (ref 35–45)
POTASSIUM BLDV-SCNC: 4.4 MMOL/L (ref 3.5–5.3)
POTASSIUM SERPL-SCNC: 4.4 MMOL/L (ref 3.5–5.3)
PROT SERPL-MCNC: 6.3 G/DL (ref 6.4–8.2)
PROT UR STRIP.AUTO-MCNC: ABNORMAL MG/DL
RBC # BLD AUTO: 4.16 X10*6/UL (ref 4–5.2)
RBC # UR STRIP.AUTO: NEGATIVE MG/DL
RBC #/AREA URNS AUTO: ABNORMAL /HPF
SAO2 % BLDV: 77 % (ref 45–75)
SODIUM BLDV-SCNC: 130 MMOL/L (ref 136–145)
SODIUM SERPL-SCNC: 133 MMOL/L (ref 136–145)
SP GR UR STRIP.AUTO: 1.03
SQUAMOUS #/AREA URNS AUTO: ABNORMAL /HPF
UROBILINOGEN UR STRIP.AUTO-MCNC: NORMAL MG/DL
WBC # BLD AUTO: 7.3 X10*3/UL (ref 4.4–11.3)
WBC #/AREA URNS AUTO: ABNORMAL /HPF

## 2025-09-02 PROCEDURE — 2500000004 HC RX 250 GENERAL PHARMACY W/ HCPCS (ALT 636 FOR OP/ED): Performed by: INTERNAL MEDICINE

## 2025-09-02 PROCEDURE — 84132 ASSAY OF SERUM POTASSIUM: CPT | Mod: 59 | Performed by: INTERNAL MEDICINE

## 2025-09-02 PROCEDURE — 83690 ASSAY OF LIPASE: CPT | Performed by: INTERNAL MEDICINE

## 2025-09-02 PROCEDURE — 83605 ASSAY OF LACTIC ACID: CPT | Mod: 91 | Performed by: INTERNAL MEDICINE

## 2025-09-02 PROCEDURE — 81001 URINALYSIS AUTO W/SCOPE: CPT | Performed by: INTERNAL MEDICINE

## 2025-09-02 PROCEDURE — 96374 THER/PROPH/DIAG INJ IV PUSH: CPT

## 2025-09-02 PROCEDURE — 99285 EMERGENCY DEPT VISIT HI MDM: CPT | Mod: 25

## 2025-09-02 PROCEDURE — 83880 ASSAY OF NATRIURETIC PEPTIDE: CPT | Performed by: INTERNAL MEDICINE

## 2025-09-02 PROCEDURE — 2500000004 HC RX 250 GENERAL PHARMACY W/ HCPCS (ALT 636 FOR OP/ED): Performed by: EMERGENCY MEDICINE

## 2025-09-02 PROCEDURE — 84132 ASSAY OF SERUM POTASSIUM: CPT | Performed by: INTERNAL MEDICINE

## 2025-09-02 PROCEDURE — 36415 COLL VENOUS BLD VENIPUNCTURE: CPT | Performed by: INTERNAL MEDICINE

## 2025-09-02 PROCEDURE — 71046 X-RAY EXAM CHEST 2 VIEWS: CPT

## 2025-09-02 PROCEDURE — 71046 X-RAY EXAM CHEST 2 VIEWS: CPT | Performed by: STUDENT IN AN ORGANIZED HEALTH CARE EDUCATION/TRAINING PROGRAM

## 2025-09-02 PROCEDURE — 87086 URINE CULTURE/COLONY COUNT: CPT | Mod: AHULAB | Performed by: INTERNAL MEDICINE

## 2025-09-02 PROCEDURE — 99284 EMERGENCY DEPT VISIT MOD MDM: CPT | Mod: 25 | Performed by: INTERNAL MEDICINE

## 2025-09-02 PROCEDURE — 2500000001 HC RX 250 WO HCPCS SELF ADMINISTERED DRUGS (ALT 637 FOR MEDICARE OP): Performed by: EMERGENCY MEDICINE

## 2025-09-02 PROCEDURE — 96376 TX/PRO/DX INJ SAME DRUG ADON: CPT

## 2025-09-02 PROCEDURE — 84484 ASSAY OF TROPONIN QUANT: CPT | Performed by: INTERNAL MEDICINE

## 2025-09-02 PROCEDURE — 93005 ELECTROCARDIOGRAM TRACING: CPT

## 2025-09-02 PROCEDURE — 85025 COMPLETE CBC W/AUTO DIFF WBC: CPT | Performed by: INTERNAL MEDICINE

## 2025-09-02 RX ORDER — ONDANSETRON HYDROCHLORIDE 2 MG/ML
4 INJECTION, SOLUTION INTRAVENOUS ONCE
Status: COMPLETED | OUTPATIENT
Start: 2025-09-02 | End: 2025-09-02

## 2025-09-02 RX ORDER — CEPHALEXIN 500 MG/1
500 CAPSULE ORAL 4 TIMES DAILY
Qty: 28 CAPSULE | Refills: 0 | Status: SHIPPED | OUTPATIENT
Start: 2025-09-02 | End: 2025-09-09

## 2025-09-02 RX ORDER — CEPHALEXIN 500 MG/1
500 CAPSULE ORAL ONCE
Status: COMPLETED | OUTPATIENT
Start: 2025-09-02 | End: 2025-09-02

## 2025-09-02 RX ORDER — ONDANSETRON 4 MG/1
4 TABLET, ORALLY DISINTEGRATING ORAL EVERY 8 HOURS PRN
Qty: 15 TABLET | Refills: 0 | Status: SHIPPED | OUTPATIENT
Start: 2025-09-02

## 2025-09-02 RX ADMIN — ONDANSETRON 4 MG: 2 INJECTION, SOLUTION INTRAMUSCULAR; INTRAVENOUS at 19:38

## 2025-09-02 RX ADMIN — ONDANSETRON 4 MG: 2 INJECTION, SOLUTION INTRAMUSCULAR; INTRAVENOUS at 21:14

## 2025-09-02 RX ADMIN — CEPHALEXIN 500 MG: 500 CAPSULE ORAL at 21:14

## 2025-09-02 ASSESSMENT — PAIN DESCRIPTION - PAIN TYPE: TYPE: ACUTE PAIN

## 2025-09-02 ASSESSMENT — PAIN - FUNCTIONAL ASSESSMENT: PAIN_FUNCTIONAL_ASSESSMENT: 0-10

## 2025-09-02 ASSESSMENT — PAIN DESCRIPTION - LOCATION: LOCATION: CHEST

## 2025-09-02 ASSESSMENT — PAIN SCALES - GENERAL: PAINLEVEL_OUTOF10: 2

## 2025-09-03 LAB
ATRIAL RATE: 55 BPM
P AXIS: 54 DEGREES
P OFFSET: 200 MS
P ONSET: 153 MS
PR INTERVAL: 150 MS
Q ONSET: 228 MS
QRS COUNT: 9 BEATS
QRS DURATION: 86 MS
QT INTERVAL: 448 MS
QTC CALCULATION(BAZETT): 428 MS
QTC FREDERICIA: 435 MS
R AXIS: -13 DEGREES
T AXIS: 64 DEGREES
T OFFSET: 452 MS
VENTRICULAR RATE: 55 BPM

## 2025-09-04 LAB — BACTERIA UR CULT: NORMAL

## 2025-09-09 ENCOUNTER — APPOINTMENT (OUTPATIENT)
Dept: NEUROLOGY | Facility: CLINIC | Age: 85
End: 2025-09-09
Payer: MEDICARE

## 2025-09-26 ENCOUNTER — APPOINTMENT (OUTPATIENT)
Dept: OBSTETRICS AND GYNECOLOGY | Facility: CLINIC | Age: 85
End: 2025-09-26
Payer: MEDICARE

## (undated) DEVICE — DRAPE, ISOLATION, ANTIMICROBIAL, W/POUCH, IOBAN 2, STERI DRAPE, 125 X 83 IN, DISPOSABLE, STERILE

## (undated) DEVICE — CAUTERY, PENCIL, PUSH BUTTON, SMOKE EVAC, 70MM

## (undated) DEVICE — GLOVE, SURGICAL, PROTEXIS PI BLUE W/NEUTHERA, 8.0, PF, LF

## (undated) DEVICE — DRAPE, INCISE, ANTIMICROBIAL, IOBAN 2, LARGE, 17 X 23 IN, DISPOSABLE, STERILE

## (undated) DEVICE — GLOVE, SURGICAL, PROTEXIS PI ORTHO, 8.0, PF, LF

## (undated) DEVICE — SOLUTION, IRRIGATION, STERILE WATER, 1000 ML, POUR BOTTLE

## (undated) DEVICE — BRUSH, FEMORAL CANAL

## (undated) DEVICE — SUTURE, VICRYL PLUS, 2-0, 27IN, PSL, UND, BRAIDED

## (undated) DEVICE — HOOD, SURGICAL, FLYTE SURGICOOL

## (undated) DEVICE — SPONGE, FEMORAL, W/ SUCTION, STERILE

## (undated) DEVICE — SUTURE, VICRYL, 0, 36 IN, CT-1, UNDYED

## (undated) DEVICE — SOLUTION, INJECTION, SODIUM CHLORIDE 9%, 3000ML

## (undated) DEVICE — MARKER, SKIN, DUAL TIP INK W/9 LABEL AND REMOVABLE TIME OUT SLEEVE

## (undated) DEVICE — Device

## (undated) DEVICE — BRUSH, INTRAMEDULLARY, FEMORAL

## (undated) DEVICE — NEEDLE, SPINAL, QUINCKE, 18 G X 3.5 IN, PINK HUB

## (undated) DEVICE — PRESSURIZER, FEMORAL CANAL, W/O HUB, MEDIUM, BLUE

## (undated) DEVICE — WOUND SYSTEM, DEBRIDEMENT & CLEANING, O.R DUOPAK

## (undated) DEVICE — SUTURE, ETHIBOND XTRA, 5 V-37, GRN/BR, LF

## (undated) DEVICE — PACKING, VAGINAL, 2 IN X 2 YD

## (undated) DEVICE — SLEEVE, SURGICAL, 21.5 X 5.5 IN, LF, STERILE

## (undated) DEVICE — TOWEL, SURGICAL, NEURO, O/R, 16 X 26, BLUE, STERILE

## (undated) DEVICE — BLADE, SAW, SAGITTAL, DUAL CUT, 18 X 90 X 1.27

## (undated) DEVICE — TUBING, IRRIGATION, HIGH FLOW, HAND PIECE SET

## (undated) DEVICE — SYRINGE, 60 CC, IRRIGATION, BULB, CONTRO-BULB, PAPER POUCH

## (undated) DEVICE — GLOVE, SURGICAL, PROTEXIS PI MICRO, 8.0, PF, LF

## (undated) DEVICE — KNIFE, ORTHOPEDIC, F/CEMENT REMOVAL, SCULPS, TIP 14 X 20 MM, GREEN, DISPOSABLE

## (undated) DEVICE — RETRIEVER, SUTURE, HEWSON